# Patient Record
Sex: MALE | Race: OTHER | HISPANIC OR LATINO | Employment: UNEMPLOYED | URBAN - METROPOLITAN AREA
[De-identification: names, ages, dates, MRNs, and addresses within clinical notes are randomized per-mention and may not be internally consistent; named-entity substitution may affect disease eponyms.]

---

## 2017-05-29 ENCOUNTER — HOSPITAL ENCOUNTER (EMERGENCY)
Facility: HOSPITAL | Age: 32
Discharge: HOME | End: 2017-05-29
Attending: EMERGENCY MEDICINE | Admitting: EMERGENCY MEDICINE

## 2017-05-29 VITALS
HEART RATE: 67 BPM | OXYGEN SATURATION: 98 % | SYSTOLIC BLOOD PRESSURE: 123 MMHG | RESPIRATION RATE: 16 BRPM | WEIGHT: 250 LBS | DIASTOLIC BLOOD PRESSURE: 62 MMHG | TEMPERATURE: 98.7 F

## 2017-05-29 DIAGNOSIS — L03.113 CELLULITIS OF RIGHT FOREARM: Primary | ICD-10-CM

## 2017-05-29 PROCEDURE — 99282 EMERGENCY DEPT VISIT SF MDM: CPT

## 2017-05-29 RX ORDER — NAPROXEN 500 MG/1
500 TABLET ORAL EVERY 12 HOURS PRN
Qty: 15 TABLET | Refills: 0 | Status: ON HOLD | OUTPATIENT
Start: 2017-05-29 | End: 2018-11-26

## 2017-05-29 RX ORDER — CEPHALEXIN 500 MG/1
500 CAPSULE ORAL 4 TIMES DAILY
Qty: 28 CAPSULE | Refills: 0 | Status: SHIPPED | OUTPATIENT
Start: 2017-05-29 | End: 2017-06-05

## 2017-05-29 RX ORDER — NAPROXEN 250 MG/1
500 TABLET ORAL ONCE
Status: COMPLETED | OUTPATIENT
Start: 2017-05-29 | End: 2017-05-29

## 2017-05-29 RX ADMIN — NAPROXEN 500 MG: 250 TABLET ORAL at 18:49

## 2017-07-05 ENCOUNTER — HOSPITAL ENCOUNTER (EMERGENCY)
Facility: HOSPITAL | Age: 32
Discharge: HOME | End: 2017-07-05

## 2017-07-05 VITALS
TEMPERATURE: 98.3 F | DIASTOLIC BLOOD PRESSURE: 56 MMHG | OXYGEN SATURATION: 98 % | WEIGHT: 258 LBS | SYSTOLIC BLOOD PRESSURE: 117 MMHG | HEART RATE: 84 BPM | RESPIRATION RATE: 16 BRPM

## 2017-07-05 DIAGNOSIS — S61.412A LACERATION OF HAND, LEFT: Primary | ICD-10-CM

## 2017-07-05 PROCEDURE — 99282 EMERGENCY DEPT VISIT SF MDM: CPT

## 2017-07-05 PROCEDURE — 90471 IMMUNIZATION ADMIN: CPT

## 2017-07-05 PROCEDURE — 90715 TDAP VACCINE 7 YRS/> IM: CPT | Performed by: PHYSICIAN ASSISTANT

## 2017-07-05 RX ORDER — LIDOCAINE HYDROCHLORIDE AND EPINEPHRINE 10; 10 MG/ML; UG/ML
5 INJECTION, SOLUTION INFILTRATION; PERINEURAL ONCE
Status: COMPLETED | OUTPATIENT
Start: 2017-07-05 | End: 2017-07-05

## 2017-07-05 RX ORDER — BACITRACIN, NEOMYCIN, POLYMYXIN B 400; 3.5; 5 [USP'U]/G; MG/G; [USP'U]/G
1 OINTMENT TOPICAL ONCE
Status: COMPLETED | OUTPATIENT
Start: 2017-07-05 | End: 2017-07-05

## 2017-07-05 RX ADMIN — BACITRACIN, NEOMYCIN, POLYMYXIN B 1 SMALL APPLICATION: 400; 3.5; 5 OINTMENT TOPICAL at 20:49

## 2017-07-05 RX ADMIN — TETANUS TOXOID, REDUCED DIPHTHERIA TOXOID AND ACELLULAR PERTUSSIS VACCINE, ADSORBED 0.5 ML: 5; 2.5; 8; 8; 2.5 SUSPENSION INTRAMUSCULAR at 20:49

## 2017-07-05 RX ADMIN — LIDOCAINE HYDROCHLORIDE,EPINEPHRINE BITARTRATE 5 ML: 10; .01 INJECTION, SOLUTION INFILTRATION; PERINEURAL at 20:49

## 2018-11-26 ENCOUNTER — APPOINTMENT (EMERGENCY)
Dept: RADIOLOGY | Facility: HOSPITAL | Age: 33
End: 2018-11-26

## 2018-11-26 ENCOUNTER — HOSPITAL ENCOUNTER (OUTPATIENT)
Facility: HOSPITAL | Age: 33
Setting detail: OBSERVATION
Discharge: HOME | End: 2018-11-26
Attending: EMERGENCY MEDICINE | Admitting: INTERNAL MEDICINE

## 2018-11-26 VITALS
BODY MASS INDEX: 43.2 KG/M2 | OXYGEN SATURATION: 96 % | HEART RATE: 80 BPM | DIASTOLIC BLOOD PRESSURE: 59 MMHG | SYSTOLIC BLOOD PRESSURE: 120 MMHG | RESPIRATION RATE: 16 BRPM | HEIGHT: 65 IN | WEIGHT: 259.26 LBS | TEMPERATURE: 96.3 F

## 2018-11-26 DIAGNOSIS — E66.01 MORBID OBESITY DUE TO EXCESS CALORIES (HCC): Chronic | ICD-10-CM

## 2018-11-26 DIAGNOSIS — E11.8 TYPE 2 DIABETES MELLITUS WITH COMPLICATION, WITHOUT LONG-TERM CURRENT USE OF INSULIN (HCC): Chronic | ICD-10-CM

## 2018-11-26 DIAGNOSIS — R07.9 CHEST PAIN: Primary | ICD-10-CM

## 2018-11-26 DIAGNOSIS — Z98.890 H/O CARDIAC CATHETERIZATION: ICD-10-CM

## 2018-11-26 DIAGNOSIS — E78.00 HYPERCHOLESTEROLEMIA: Chronic | ICD-10-CM

## 2018-11-26 DIAGNOSIS — I10 ESSENTIAL HYPERTENSION: Chronic | ICD-10-CM

## 2018-11-26 DIAGNOSIS — I25.10 CORONARY ARTERY DISEASE INVOLVING NATIVE HEART WITHOUT ANGINA PECTORIS, UNSPECIFIED VESSEL OR LESION TYPE: ICD-10-CM

## 2018-11-26 PROBLEM — E11.9 DM (DIABETES MELLITUS), TYPE 2 (HCC): Chronic | Status: ACTIVE | Noted: 2018-11-26

## 2018-11-26 LAB
ALBUMIN SERPL BCP-MCNC: 3.7 G/DL (ref 3.5–5)
ALP SERPL-CCNC: 71 U/L (ref 46–116)
ALT SERPL W P-5'-P-CCNC: 28 U/L (ref 12–78)
ANION GAP SERPL CALCULATED.3IONS-SCNC: 11 MMOL/L (ref 4–13)
ANION GAP SERPL CALCULATED.3IONS-SCNC: 13 MMOL/L (ref 4–13)
APTT PPP: 27 SECONDS (ref 26–38)
AST SERPL W P-5'-P-CCNC: 11 U/L (ref 5–45)
ATRIAL RATE: 101 BPM
ATRIAL RATE: 107 BPM
ATRIAL RATE: 86 BPM
BASOPHILS # BLD AUTO: 0.01 THOUSANDS/ΜL (ref 0–0.1)
BASOPHILS # BLD AUTO: 0.02 THOUSANDS/ΜL (ref 0–0.1)
BASOPHILS NFR BLD AUTO: 0 % (ref 0–1)
BASOPHILS NFR BLD AUTO: 0 % (ref 0–1)
BILIRUB SERPL-MCNC: 0.37 MG/DL (ref 0.2–1)
BUN SERPL-MCNC: 12 MG/DL (ref 5–25)
BUN SERPL-MCNC: 12 MG/DL (ref 5–25)
CALCIUM SERPL-MCNC: 9.3 MG/DL (ref 8.3–10.1)
CALCIUM SERPL-MCNC: 9.5 MG/DL (ref 8.3–10.1)
CHLORIDE SERPL-SCNC: 101 MMOL/L (ref 100–108)
CHLORIDE SERPL-SCNC: 99 MMOL/L (ref 100–108)
CHOLEST SERPL-MCNC: 330 MG/DL (ref 50–200)
CO2 SERPL-SCNC: 25 MMOL/L (ref 21–32)
CO2 SERPL-SCNC: 25 MMOL/L (ref 21–32)
CREAT SERPL-MCNC: 0.76 MG/DL (ref 0.6–1.3)
CREAT SERPL-MCNC: 0.94 MG/DL (ref 0.6–1.3)
EOSINOPHIL # BLD AUTO: 0.1 THOUSAND/ΜL (ref 0–0.61)
EOSINOPHIL # BLD AUTO: 0.16 THOUSAND/ΜL (ref 0–0.61)
EOSINOPHIL NFR BLD AUTO: 1 % (ref 0–6)
EOSINOPHIL NFR BLD AUTO: 2 % (ref 0–6)
ERYTHROCYTE [DISTWIDTH] IN BLOOD BY AUTOMATED COUNT: 12.6 % (ref 11.6–15.1)
ERYTHROCYTE [DISTWIDTH] IN BLOOD BY AUTOMATED COUNT: 12.7 % (ref 11.6–15.1)
EST. AVERAGE GLUCOSE BLD GHB EST-MCNC: 192 MG/DL
GFR SERPL CREATININE-BSD FRML MDRD: 105 ML/MIN/1.73SQ M
GFR SERPL CREATININE-BSD FRML MDRD: 119 ML/MIN/1.73SQ M
GLUCOSE P FAST SERPL-MCNC: 152 MG/DL (ref 65–99)
GLUCOSE SERPL-MCNC: 152 MG/DL (ref 65–140)
GLUCOSE SERPL-MCNC: 168 MG/DL (ref 65–140)
GLUCOSE SERPL-MCNC: 218 MG/DL (ref 65–140)
HBA1C MFR BLD: 8.3 % (ref 4.2–6.3)
HCT VFR BLD AUTO: 42.3 % (ref 36.5–49.3)
HCT VFR BLD AUTO: 45.3 % (ref 36.5–49.3)
HDLC SERPL-MCNC: 30 MG/DL (ref 40–60)
HGB BLD-MCNC: 14.4 G/DL (ref 12–17)
HGB BLD-MCNC: 15.1 G/DL (ref 12–17)
IMM GRANULOCYTES # BLD AUTO: 0.02 THOUSAND/UL (ref 0–0.2)
IMM GRANULOCYTES # BLD AUTO: 0.02 THOUSAND/UL (ref 0–0.2)
IMM GRANULOCYTES NFR BLD AUTO: 0 % (ref 0–2)
IMM GRANULOCYTES NFR BLD AUTO: 0 % (ref 0–2)
INR PPP: 1.03 (ref 0.86–1.17)
LDLC SERPL CALC-MCNC: 253 MG/DL (ref 0–100)
LYMPHOCYTES # BLD AUTO: 1.61 THOUSANDS/ΜL (ref 0.6–4.47)
LYMPHOCYTES # BLD AUTO: 1.77 THOUSANDS/ΜL (ref 0.6–4.47)
LYMPHOCYTES NFR BLD AUTO: 18 % (ref 14–44)
LYMPHOCYTES NFR BLD AUTO: 21 % (ref 14–44)
MCH RBC QN AUTO: 30 PG (ref 26.8–34.3)
MCH RBC QN AUTO: 30.6 PG (ref 26.8–34.3)
MCHC RBC AUTO-ENTMCNC: 33.3 G/DL (ref 31.4–37.4)
MCHC RBC AUTO-ENTMCNC: 34 G/DL (ref 31.4–37.4)
MCV RBC AUTO: 90 FL (ref 82–98)
MCV RBC AUTO: 90 FL (ref 82–98)
MONOCYTES # BLD AUTO: 0.51 THOUSAND/ΜL (ref 0.17–1.22)
MONOCYTES # BLD AUTO: 0.65 THOUSAND/ΜL (ref 0.17–1.22)
MONOCYTES NFR BLD AUTO: 6 % (ref 4–12)
MONOCYTES NFR BLD AUTO: 8 % (ref 4–12)
NEUTROPHILS # BLD AUTO: 5.68 THOUSANDS/ΜL (ref 1.85–7.62)
NEUTROPHILS # BLD AUTO: 6.67 THOUSANDS/ΜL (ref 1.85–7.62)
NEUTS SEG NFR BLD AUTO: 69 % (ref 43–75)
NEUTS SEG NFR BLD AUTO: 75 % (ref 43–75)
NRBC BLD AUTO-RTO: 0 /100 WBCS
NRBC BLD AUTO-RTO: 0 /100 WBCS
P AXIS: 57 DEGREES
P AXIS: 62 DEGREES
P AXIS: 63 DEGREES
PLATELET # BLD AUTO: 267 THOUSANDS/UL (ref 149–390)
PLATELET # BLD AUTO: 280 THOUSANDS/UL (ref 149–390)
PMV BLD AUTO: 10.2 FL (ref 8.9–12.7)
PMV BLD AUTO: 10.3 FL (ref 8.9–12.7)
POTASSIUM SERPL-SCNC: 3.5 MMOL/L (ref 3.5–5.3)
POTASSIUM SERPL-SCNC: 3.5 MMOL/L (ref 3.5–5.3)
PR INTERVAL: 152 MS
PR INTERVAL: 160 MS
PR INTERVAL: 162 MS
PROT SERPL-MCNC: 8.2 G/DL (ref 6.4–8.2)
PROTHROMBIN TIME: 13.6 SECONDS (ref 11.8–14.2)
QRS AXIS: 50 DEGREES
QRS AXIS: 51 DEGREES
QRS AXIS: 60 DEGREES
QRSD INTERVAL: 82 MS
QRSD INTERVAL: 84 MS
QRSD INTERVAL: 86 MS
QT INTERVAL: 314 MS
QT INTERVAL: 318 MS
QT INTERVAL: 336 MS
QTC INTERVAL: 402 MS
QTC INTERVAL: 412 MS
QTC INTERVAL: 419 MS
RBC # BLD AUTO: 4.7 MILLION/UL (ref 3.88–5.62)
RBC # BLD AUTO: 5.03 MILLION/UL (ref 3.88–5.62)
SODIUM SERPL-SCNC: 137 MMOL/L (ref 136–145)
SODIUM SERPL-SCNC: 137 MMOL/L (ref 136–145)
T WAVE AXIS: 37 DEGREES
T WAVE AXIS: 41 DEGREES
T WAVE AXIS: 50 DEGREES
TRIGL SERPL-MCNC: 234 MG/DL
TROPONIN I SERPL-MCNC: <0.02 NG/ML
VENTRICULAR RATE: 101 BPM
VENTRICULAR RATE: 107 BPM
VENTRICULAR RATE: 86 BPM
WBC # BLD AUTO: 8.29 THOUSAND/UL (ref 4.31–10.16)
WBC # BLD AUTO: 8.93 THOUSAND/UL (ref 4.31–10.16)

## 2018-11-26 PROCEDURE — 80048 BASIC METABOLIC PNL TOTAL CA: CPT | Performed by: INTERNAL MEDICINE

## 2018-11-26 PROCEDURE — 80053 COMPREHEN METABOLIC PANEL: CPT | Performed by: EMERGENCY MEDICINE

## 2018-11-26 PROCEDURE — 99285 EMERGENCY DEPT VISIT HI MDM: CPT

## 2018-11-26 PROCEDURE — 71046 X-RAY EXAM CHEST 2 VIEWS: CPT

## 2018-11-26 PROCEDURE — 85025 COMPLETE CBC W/AUTO DIFF WBC: CPT | Performed by: NURSE PRACTITIONER

## 2018-11-26 PROCEDURE — 83036 HEMOGLOBIN GLYCOSYLATED A1C: CPT | Performed by: NURSE PRACTITIONER

## 2018-11-26 PROCEDURE — 82948 REAGENT STRIP/BLOOD GLUCOSE: CPT

## 2018-11-26 PROCEDURE — 85610 PROTHROMBIN TIME: CPT | Performed by: EMERGENCY MEDICINE

## 2018-11-26 PROCEDURE — 99236 HOSP IP/OBS SAME DATE HI 85: CPT | Performed by: HOSPITALIST

## 2018-11-26 PROCEDURE — 85025 COMPLETE CBC W/AUTO DIFF WBC: CPT | Performed by: EMERGENCY MEDICINE

## 2018-11-26 PROCEDURE — 85730 THROMBOPLASTIN TIME PARTIAL: CPT | Performed by: EMERGENCY MEDICINE

## 2018-11-26 PROCEDURE — 84484 ASSAY OF TROPONIN QUANT: CPT | Performed by: NURSE PRACTITIONER

## 2018-11-26 PROCEDURE — 93005 ELECTROCARDIOGRAM TRACING: CPT

## 2018-11-26 PROCEDURE — 93010 ELECTROCARDIOGRAM REPORT: CPT | Performed by: INTERNAL MEDICINE

## 2018-11-26 PROCEDURE — 90686 IIV4 VACC NO PRSV 0.5 ML IM: CPT | Performed by: HOSPITALIST

## 2018-11-26 PROCEDURE — 80061 LIPID PANEL: CPT | Performed by: NURSE PRACTITIONER

## 2018-11-26 PROCEDURE — 84484 ASSAY OF TROPONIN QUANT: CPT | Performed by: EMERGENCY MEDICINE

## 2018-11-26 PROCEDURE — 99220 PR INITIAL OBSERVATION CARE/DAY 70 MINUTES: CPT | Performed by: HOSPITALIST

## 2018-11-26 PROCEDURE — 36415 COLL VENOUS BLD VENIPUNCTURE: CPT | Performed by: EMERGENCY MEDICINE

## 2018-11-26 RX ORDER — ROSUVASTATIN CALCIUM 40 MG/1
40 TABLET, COATED ORAL
Qty: 30 TABLET | Refills: 0 | Status: SHIPPED | OUTPATIENT
Start: 2018-11-26 | End: 2019-11-05 | Stop reason: HOSPADM

## 2018-11-26 RX ORDER — CLOPIDOGREL BISULFATE 75 MG/1
75 TABLET ORAL DAILY
Status: DISCONTINUED | OUTPATIENT
Start: 2018-11-26 | End: 2018-11-26 | Stop reason: HOSPADM

## 2018-11-26 RX ORDER — ATORVASTATIN CALCIUM 40 MG/1
40 TABLET, FILM COATED ORAL
Status: DISCONTINUED | OUTPATIENT
Start: 2018-11-26 | End: 2018-11-26 | Stop reason: HOSPADM

## 2018-11-26 RX ORDER — ASPIRIN 81 MG/1
81 TABLET ORAL DAILY
Status: ON HOLD | COMMUNITY
End: 2018-11-26

## 2018-11-26 RX ORDER — EZETIMIBE 10 MG/1
10 TABLET ORAL DAILY
COMMUNITY

## 2018-11-26 RX ORDER — LISINOPRIL 5 MG/1
5 TABLET ORAL DAILY
Status: DISCONTINUED | OUTPATIENT
Start: 2018-11-26 | End: 2018-11-26 | Stop reason: HOSPADM

## 2018-11-26 RX ORDER — ACETAMINOPHEN 325 MG/1
650 TABLET ORAL EVERY 4 HOURS PRN
Status: DISCONTINUED | OUTPATIENT
Start: 2018-11-26 | End: 2018-11-26 | Stop reason: HOSPADM

## 2018-11-26 RX ORDER — METOPROLOL SUCCINATE 25 MG/1
25 TABLET, EXTENDED RELEASE ORAL DAILY
Status: ON HOLD | COMMUNITY
End: 2018-11-26

## 2018-11-26 RX ORDER — CLOPIDOGREL BISULFATE 75 MG/1
75 TABLET ORAL DAILY
Qty: 30 TABLET | Refills: 0 | Status: SHIPPED | OUTPATIENT
Start: 2018-11-26 | End: 2019-11-05 | Stop reason: HOSPADM

## 2018-11-26 RX ORDER — ASPIRIN 81 MG/1
81 TABLET ORAL DAILY
Qty: 30 TABLET | Refills: 0 | Status: SHIPPED | OUTPATIENT
Start: 2018-11-26 | End: 2019-11-05 | Stop reason: HOSPADM

## 2018-11-26 RX ORDER — ENALAPRIL MALEATE 2.5 MG/1
2.5 TABLET ORAL DAILY
Status: ON HOLD | COMMUNITY
End: 2018-11-26

## 2018-11-26 RX ORDER — ONDANSETRON 2 MG/ML
4 INJECTION INTRAMUSCULAR; INTRAVENOUS EVERY 6 HOURS PRN
Status: DISCONTINUED | OUTPATIENT
Start: 2018-11-26 | End: 2018-11-26 | Stop reason: HOSPADM

## 2018-11-26 RX ORDER — METOPROLOL SUCCINATE 25 MG/1
25 TABLET, EXTENDED RELEASE ORAL DAILY
Status: DISCONTINUED | OUTPATIENT
Start: 2018-11-26 | End: 2018-11-26 | Stop reason: HOSPADM

## 2018-11-26 RX ORDER — ASPIRIN 81 MG/1
324 TABLET, CHEWABLE ORAL ONCE
Status: COMPLETED | OUTPATIENT
Start: 2018-11-26 | End: 2018-11-26

## 2018-11-26 RX ORDER — METOPROLOL SUCCINATE 50 MG/1
50 TABLET, EXTENDED RELEASE ORAL DAILY
Qty: 30 TABLET | Refills: 0 | Status: ON HOLD | OUTPATIENT
Start: 2018-11-26 | End: 2019-11-05 | Stop reason: SDUPTHER

## 2018-11-26 RX ORDER — METOPROLOL SUCCINATE 50 MG/1
50 TABLET, EXTENDED RELEASE ORAL DAILY
Status: ON HOLD | COMMUNITY
End: 2018-11-26

## 2018-11-26 RX ORDER — NAPROXEN 500 MG/1
500 TABLET ORAL EVERY 12 HOURS PRN
Qty: 15 TABLET | Refills: 0 | Status: SHIPPED | OUTPATIENT
Start: 2018-11-26 | End: 2019-11-05 | Stop reason: HOSPADM

## 2018-11-26 RX ORDER — CLOPIDOGREL BISULFATE 75 MG/1
75 TABLET ORAL DAILY
Status: ON HOLD | COMMUNITY
End: 2018-11-26

## 2018-11-26 RX ORDER — ACETAMINOPHEN 325 MG/1
TABLET ORAL
Qty: 30 TABLET | Refills: 0 | Status: SHIPPED | OUTPATIENT
Start: 2018-11-26

## 2018-11-26 RX ORDER — ENALAPRIL MALEATE 2.5 MG/1
2.5 TABLET ORAL DAILY
Qty: 30 TABLET | Refills: 0 | Status: ON HOLD | OUTPATIENT
Start: 2018-11-26 | End: 2019-11-05 | Stop reason: SDUPTHER

## 2018-11-26 RX ORDER — ATORVASTATIN CALCIUM 40 MG/1
40 TABLET, FILM COATED ORAL DAILY
COMMUNITY
End: 2018-11-26 | Stop reason: HOSPADM

## 2018-11-26 RX ORDER — ROSUVASTATIN CALCIUM 40 MG/1
40 TABLET, COATED ORAL
Status: ON HOLD | COMMUNITY
End: 2018-11-26

## 2018-11-26 RX ORDER — ASPIRIN 81 MG/1
81 TABLET ORAL DAILY
Status: DISCONTINUED | OUTPATIENT
Start: 2018-11-26 | End: 2018-11-26 | Stop reason: HOSPADM

## 2018-11-26 RX ORDER — NITROGLYCERIN 0.4 MG/1
0.4 TABLET SUBLINGUAL
Status: DISCONTINUED | OUTPATIENT
Start: 2018-11-26 | End: 2018-11-26 | Stop reason: HOSPADM

## 2018-11-26 RX ADMIN — NITROGLYCERIN 0.4 MG: 0.4 TABLET SUBLINGUAL at 01:41

## 2018-11-26 RX ADMIN — LISINOPRIL 5 MG: 5 TABLET ORAL at 08:57

## 2018-11-26 RX ADMIN — INFLUENZA VIRUS VACCINE 0.5 ML: 15; 15; 15; 15 SUSPENSION INTRAMUSCULAR at 10:49

## 2018-11-26 RX ADMIN — ASPIRIN 81 MG 324 MG: 81 TABLET ORAL at 01:02

## 2018-11-26 RX ADMIN — CLOPIDOGREL BISULFATE 75 MG: 75 TABLET ORAL at 08:57

## 2018-11-26 RX ADMIN — ASPIRIN 81 MG: 81 TABLET, COATED ORAL at 08:57

## 2018-11-26 RX ADMIN — INSULIN LISPRO 2 UNITS: 100 INJECTION, SOLUTION INTRAVENOUS; SUBCUTANEOUS at 08:56

## 2018-11-26 RX ADMIN — METOPROLOL SUCCINATE 25 MG: 25 TABLET, EXTENDED RELEASE ORAL at 08:57

## 2018-11-26 NOTE — ASSESSMENT & PLAN NOTE
· Reports chest pain radiating to both arms; h/o MI in 2014 s/p JESUS MANUEL on ASA and Plavix, continue  · NICK=2 for CAD risk factors and ASA  · EKG without acute ischemic changes  · Troponin negative, trend x3  · Telemetry monitoring  · Outpatient follow-up with Cardiology

## 2018-11-26 NOTE — ASSESSMENT & PLAN NOTE
· Patient unsure of name of medication although reports he is on a statin    Will give atorvastatin 40 mg  · Lipid panel

## 2018-11-26 NOTE — NURSING NOTE
With the use of a Arabic speaking RN, pt was explained his discharge instructions  Reviewed his medications and educated pt on the importance of taking his meds as scheduled  RN also educated pt on the importance of diet and weight mgmt    Pt's significant other at bedside

## 2018-11-26 NOTE — DISCHARGE SUMMARY
Discharge Summary - Medical Alize Augustin 28 y o  male MRN: [id number]    On license of [facility]  Room / Bed: [facility] [address] 951/Y0 -* Encounter: [id number]    BRIEF OVERVIEW      Admission Date: 11/26/2018       Discharge Date:  11/26/2018      Admitting Diagnosis:  Chest pain    Primary Discharge Diagnosis  Principal Problem (Resolved):    Chest pain-noncardiac  Active Problems:    Hypercholesterolemia    Essential hypertension    DM (diabetes mellitus), type 2 (HCC)    Morbid obesity with body mass index (BMI) of 40 0 to 44 9 in [facility])      Service:  [facility] Internal Medicine    Assessment and plan on date of discharge    Chest pain-a typical noncardiac   Assessment & Plan     · Reports chest pain radiating to both arms; h/o MI in 2014 s/p JESUS MANUEL on ASA and EKG without acute ischemic changes  · Troponin negative, trend x3      DM (diabetes mellitus), type 2 (HCC)   Assessment & Plan             Lab Results   Component Value Date     HGBA1C 6 7 (H) 03/01/2014      · On Janumet 50-1000mg bid-will continue  ·        Blood Sugar Average: Last 72 hrs:         Essential hypertension   Assessment & Plan     · Reports he is on daily metoprolol and enalapril,-however doubt compliance         Hypercholesterolemia   Assessment & Plan     · On Crestor  ·       Morbid obesity with body mass index (BMI) of 40 0 to 44 9 in [facility])   Assessment & Plan     · Body mass index is 43 14 kg/m²    · Encourage weight loss and exercise  ·         Patient is stable for discharge home  Discharge Condition: Improved    Discharge Disposition: Home/Self Care    Discharge Follow Up Appointments:   PCP    Discharge  Statement   Total Time Spent today including physical exam, discussion with patient and family, and discharge arrangements/care22 minutes

## 2018-11-26 NOTE — UTILIZATION REVIEW
[address]  Utilization Review Department  Phone: [phone]; Fax [phone]  [email or web address]  ATTENTION: Please call with any questions or concerns to [phone]  and carefully listen to the prompts so that you are directed to the right person  Send all requests for admission clinical reviews, approved or denied determinations and any other requests to fax [phone]  All voicemails are confidential   Initial Clinical Review    Admission: Date/Time/Statement: 11/26/2018  0201    Orders Placed This Encounter   Procedures    Place in Observation (expected length of stay for this patient is less than two midnights)     Standing Status:   Standing     Number of Occurrences:   1     Order Specific Question:   Admitting Physician     Answer:   Pieter Vance     Order Specific Question:   Level of Care     Answer:   Med Surg [16]         ED: Date/Time/Mode of Arrival:   ED Arrival Information     Expected Arrival Acuity Means of Arrival Escorted By Service Admission Type    - 11/26/2018 00:32 Emergent Walk-In Self Hospitalist Emergency    Arrival Complaint    chest pain          Chief Complaint:   Chief Complaint   Patient presents with    Chest Pain     B/L upper chest pain, arms for 1 hour, SOB          History of Illness: 28 yr old s/p stent in 2014--c/o chest pain radiating to both arms while watching tv-- took 1 ntg and came to er    ED Vital Signs:   ED Triage Vitals [11/26/18 0038]   Temperature Pulse Respirations Blood Pressure SpO2   98 1 °F (36 7 °C) (!) 113 20 135/88 96 %      Temp Source Heart Rate Source Patient Position - Orthostatic VS BP Location FiO2 (%)   Oral Monitor Sitting Left arm --      Pain Score       9        Wt Readings from Last 1 Encounters:   11/26/18 118 kg (259 lb 4 2 oz)       Vital Signs (abnormal):pulse     Abnormal Labs/Diagnostic Test Results:cl 99--bs 218  Chest-- borderline cardiomegaly  ED Treatment: Medication Administration from 11/26/2018 0032 to 11/26/2018 0239       Date/Time Order Dose Route Action Action by Comments     11/26/2018 0102 aspirin chewable tablet 324 mg 324 mg Oral Given Daniel Fitch RN      11/26/2018 0141 nitroglycerin (NITROSTAT) SL tablet 0 4 mg 0 4 mg Sublingual Given Daniel Fitch RN           Past Medical/Surgical History:    Active Ambulatory Problems     Diagnosis Date Noted    No Active Ambulatory Problems     Resolved Ambulatory Problems     Diagnosis Date Noted    No Resolved Ambulatory Problems     Past Medical History:   Diagnosis Date    Cardiac disease        Admitting Diagnosis: Chest pain [R07 9]  H/O cardiac catheterization [Z98 890]    Age/Sex: 28 y o  male    Assessment/Plan: chest pain -  telm  Cardiology consult   trops  x3      Admission Orders:  Scheduled Meds:   Current Facility-Administered Medications:  acetaminophen 650 mg Oral Q4H PRN Omega Crank, CRNP   aspirin 81 mg Oral Daily Omega Crank, CRNP   atorvastatin 40 mg Oral Daily With [employer], CRNP   clopidogrel 75 mg Oral Daily Omega Crank, CRNP   insulin lispro 1-5 Units Subcutaneous HS [place] Crank, CRNP   insulin lispro 2-12 Units Subcutaneous TID AC Omega Crank, CRNP   lisinopril 5 mg Oral Daily Omega Crank, CRNP   metoprolol succinate 25 mg Oral Daily Omega Crank, CRNP   nitroglycerin 0 4 mg Sublingual Q5 Min PRN Mehrdad Cruz PA-C   ondansetron 4 mg Intravenous Q6H PRN Omega Crank, CRNP     Continuous Infusions:    PRN Meds:   acetaminophen    nitroglycerinx1    ondansetron       fs glucose 4xdaily  telm  Trop  <0 02 x2  Cardiac diet    Triglycerides  234--hdl--30--ldl 253

## 2018-11-26 NOTE — ASSESSMENT & PLAN NOTE
Lab Results   Component Value Date    HGBA1C 6 7 (H) 03/01/2014     · On Janumet 50-1000mg bid  · Start Accu-Cheks and sliding scale  No results for input(s): POCGLU in the last 72 hours      Blood Sugar Average: Last 72 hrs:

## 2018-11-26 NOTE — H&P
H&P- Jaleel Augustin 1985, 28 y o  male MRN: [id number]    Unit/Bed#: E4 -01 Encounter: [id number]    Primary Care Provider: No primary care provider on file  Date and time admitted to hospital: 11/26/2018 12:44 AM      * Chest pain   Assessment & Plan    · Reports chest pain radiating to both arms; h/o MI in 2014 s/p JESUS MANUEL on ASA and Plavix, continue  · NICK=2 for CAD risk factors and ASA  · EKG without acute ischemic changes  · Troponin negative, trend x3  · Telemetry monitoring  · Outpatient follow-up with Cardiology     [facility]di[facility] ([facility] )   Assessment & Plan    Lab Results   Component Value Date    HGBA1C 6 7 (H) 03/01/2014     · On Janumet 50-1000mg bid  · Start Accu-Cheks and sliding scale  No results for input(s): POCGLU in the last 72 hours  Blood Sugar Average: Last 72 hrs:       Essential hypertension   Assessment & Plan    · Reports he is on daily metoprolol and enalapril, unsure of dose  Will give low-dose of each     Hypercholesterolemia   Assessment & Plan    · Patient unsure of name of medication although reports he is on a statin  Will give atorvastatin 40 mg  · Lipid panel     Morbid obesity with body mass index (BMI) of 40 0 to 44 9 in adult [facility])   Assessment & Plan    · Body mass index is 43 14 kg/m²  · Encourage weight loss and exercise  · Nutrition consult         VTE Prophylaxis: Low risk  / reason for no mechanical VTE prophylaxis Ambulate   Code Status:  Full code  POLST: POLST is not applicable to this patient  Discussion with family:  Spouse at bedside    Anticipated Length of Stay:  Patient will be admitted on an Observation basis with an anticipated length of stay of  < 2 midnights  Justification for Hospital Stay: CP    Total Time for Visit, including Counseling / Coordination of Care: 45 minutes  Greater than 50% of this total time spent on direct patient counseling and coordination of care      Chief Complaint:   Chest pain    History of Present Illness:    Pauline Deleon is a 28 y o  male with [address] [place] 2014 s/p stent, DM, HTN, HLD who presents with c/o Chest pain  Reports he was seated watching TV, he developed chest pain radiating to both arms, took 1 NTG and came to ER; denies associated shortness of breath, diaphoresis or nausea, no aggravating or relieving factor identified, chest pain nonreproducible on palpation  Denies any other symptoms, ROS negative for palpitations, fever, cough, shortness of breath, abdominal pain, NVCD or dysuria  Review of Systems:    Review of Systems   Constitutional: Negative  HENT: Negative  Respiratory: Negative  Cardiovascular: Positive for chest pain  Negative for palpitations and leg swelling  Gastrointestinal: Negative  Genitourinary: Negative  Musculoskeletal: Negative  Neurological: Negative  Past Medical and Surgical History:     Past Medical History:   Diagnosis Date    Cardiac disease        Past Surgical History:   Procedure Laterality Date    CARDIAC CATHETERIZATION      CORONARY ANGIOPLASTY WITH STENT PLACEMENT         Meds/Allergies:    Prior to Admission medications    Medication Sig Start Date End Date Taking?  Authorizing Provider   aspirin (ECOTRIN LOW STRENGTH) 81 mg EC tablet Take 81 mg by mouth daily   Yes Historical Provider, MD   atorvastatin (LIPITOR) 40 mg tablet Take 40 mg by mouth daily   Yes Historical Provider, MD   clopidogrel (PLAVIX) 75 mg tablet Take 75 mg by mouth daily   Yes Historical Provider, MD   enalapril (VASOTEC) 10 mg tablet Take 10 mg by mouth daily   Yes Historical Provider, MD   metoprolol succinate (TOPROL-XL) 25 mg 24 hr tablet Take 25 mg by mouth daily   Yes Historical Provider, MD   sitaGLIPtin-metFORMIN (JANUMET)  MG per tablet Take 1 tablet by mouth 2 (two) times a day with meals   Yes Historical Provider, MD   naproxen (NAPROSYN) 500 mg tablet Take 1 tablet by mouth every 12 (twelve) hours as needed for mild pain or moderate pain 5/29/17   Jerrod March, DO     I have reviewed home medications with patient personally  Allergies: No Known Allergies    Social History:     Marital Status: Single   Occupation: [employer]  Patient Pre-hospital Living Situation: lives at home w spouse and kids  Patient Pre-hospital Level of Mobility: ambulatory  Patient Pre-hospital Diet Restrictions:   Substance Use History:   History   Alcohol Use    Yes     Comment: socailly      History   Smoking Status    Never Smoker   Smokeless Tobacco    Never Used     History   Drug Use No       Family History:    History reviewed  No pertinent family history  Physical Exam:     Vitals:   Blood Pressure: 113/64 (11/26/18 0247)  Pulse: 80 (11/26/18 0247)  Temperature: (!) 96 2 °F (35 7 °C) (11/26/18 0247)  Temp Source: Tympanic (11/26/18 0247)  Respirations: 18 (11/26/18 0247)  Height: 5' 5" (165 1 cm) (11/26/18 0247)  Weight - Scale: 118 kg (259 lb 4 2 oz) (11/26/18 0247)  SpO2: 93 % (11/26/18 0247)    Physical Exam   Constitutional: He is oriented to person, place, and time  He appears well-developed  No distress  Morbid obesity   HENT:   Head: Normocephalic and atraumatic  Eyes: Conjunctivae are normal    Neck: Neck supple  Cardiovascular: Normal rate, regular rhythm, normal heart sounds and intact distal pulses  No murmur heard  Pulmonary/Chest: Effort normal and breath sounds normal  No respiratory distress  He has no wheezes  He has no rales  He exhibits no tenderness  Abdominal: Soft  Bowel sounds are normal  He exhibits no distension and no mass  There is no tenderness  There is no rebound and no guarding  Musculoskeletal: He exhibits no edema or tenderness  Neurological: He is alert and oriented to person, place, and time  Skin: Skin is dry  No rash noted  He is not diaphoretic  No erythema  No pallor  Psychiatric: He has a normal mood and affect   His behavior is normal  Judgment and thought content normal      Additional Data:     Lab Results: I have personally reviewed pertinent reports  Results from last 7 days  Lab Units 11/26/18  0046   WBC Thousand/uL 8 93   HEMOGLOBIN g/dL 15 1   HEMATOCRIT % 45 3   PLATELETS Thousands/uL 280   NEUTROS PCT % 75   LYMPHS PCT % 18   MONOS PCT % 6   EOS PCT % 1       Results from last 7 days  Lab Units 11/26/18  0046   SODIUM mmol/L 137   POTASSIUM mmol/L 3 5   CHLORIDE mmol/L 99*   CO2 mmol/L 25   BUN mg/dL 12   CREATININE mg/dL 0 94   ANION GAP mmol/L 13   CALCIUM mg/dL 9 5   ALBUMIN g/dL 3 7   TOTAL BILIRUBIN mg/dL 0 37   ALK PHOS U/L 71   ALT U/L 28   AST U/L 11   GLUCOSE RANDOM mg/dL 218*       Results from last 7 days  Lab Units 11/26/18  0046   INR  1 03                   Imaging: I have personally reviewed pertinent reports  X-ray chest 2 views   ED Interpretation by Susy Cottrell PA-C (11/26 0121)   Borderline cardiomegaly, otherwise acute infiltrates or effusions          EKG, Pathology, and Other Studies Reviewed on Admission:   · EKG: CXR    Allscripts / Epic Records Reviewed: Yes     ** Please Note: This note has been constructed using a voice recognition system   **

## 2018-11-26 NOTE — ED PROVIDER NOTES
History  Chief Complaint   Patient presents with    Chest Pain     B/L upper chest pain, arms for 1 hour, SOB  25-year-old male with history of cardiac catheterization with single stent placement in 2014 presents emergency department for evaluation of sudden-onset anterior chest pain beginning 1 hour prior to arrival, radiating to bilateral upper arms  Patient states he is unaware symptoms are similar to prior cardiac disease  Pain began suddenly while at rest, has gradually worsened since onset, and worsens with exertion  Denies associated chills, sweats, fever, cough, back pain, nausea, vomiting, diarrhea, abdominal pain  He does not take any aspirin on a daily basis  Took one dose of SL nitro PTA without relief  On exam, he is diaphoretic, and appears to be in distress  He is not tachypneic and there is no respiratory distress  Heart rate is mildly elevated at 110, vital signs are otherwise unremarkable  Initial EKG shows minor T-wave flattening in V2 and V3, otherwise unchanged from previous EKG in March of 2018  Heart is regular without murmurs rubs or gallops, lungs are clear bilaterally without rales or rhonchi  Abdomen is soft and benign  Peripheral pulses are 2+ globally  Assessment/plan:  High level suspicion for acute coronary syndrome  Plan to obtain cardiac panel, troponin, chest x-ray, serial EKGs  Given his significant cardiac history, he will likely be admission regardless of the outcome of troponin or serial EKGs  History provided by:  Patient   used: Yes ([employer] [id number])        Prior to Admission Medications   Prescriptions Last Dose Informant Patient Reported?  Taking?   aspirin (ECOTRIN LOW STRENGTH) 81 mg EC tablet   Yes Yes   Sig: Take 81 mg by mouth daily   atorvastatin (LIPITOR) 40 mg tablet   Yes Yes   Sig: Take 40 mg by mouth daily   clopidogrel (PLAVIX) 75 mg tablet   Yes Yes   Sig: Take 75 mg by mouth daily   enalapril (VASOTEC) 10 mg tablet   Yes Yes   Sig: Take 10 mg by mouth daily   metoprolol succinate (TOPROL-XL) 25 mg 24 hr tablet   Yes Yes   Sig: Take 25 mg by mouth daily   naproxen (NAPROSYN) 500 mg tablet   No No   Sig: Take 1 tablet by mouth every 12 (twelve) hours as needed for mild pain or moderate pain   sitaGLIPtin-metFORMIN (JANUMET)  MG per tablet   Yes Yes   Sig: Take 1 tablet by mouth 2 (two) times a day with meals      Facility-Administered Medications: None       Past Medical History:   Diagnosis Date    Cardiac disease        Past Surgical History:   Procedure Laterality Date    CARDIAC CATHETERIZATION      CORONARY ANGIOPLASTY WITH STENT PLACEMENT         History reviewed  No pertinent family history  I have reviewed and agree with the history as documented  Social History   Substance Use Topics    Smoking status: Never Smoker    Smokeless tobacco: Never Used    Alcohol use Yes      Comment: socailly         Review of Systems   Constitutional: Positive for diaphoresis  Negative for appetite change, chills, fatigue and fever  Eyes: Negative for visual disturbance  Respiratory: Positive for chest tightness  Negative for cough and shortness of breath  Cardiovascular: Positive for chest pain  Negative for palpitations and leg swelling  Gastrointestinal: Negative for abdominal pain, diarrhea, nausea and vomiting  Musculoskeletal: Negative for arthralgias, back pain and myalgias  Skin: Negative for color change and rash  Allergic/Immunologic: Negative for immunocompromised state  Neurological: Negative for dizziness, light-headedness and headaches  Hematological: Does not bruise/bleed easily  Psychiatric/Behavioral: Negative for confusion  Physical Exam  Physical Exam   Constitutional: He is oriented to person, place, and time  He appears well-developed and well-nourished  No distress  HENT:   Head: Normocephalic and atraumatic     Mouth/Throat: Oropharynx is clear and moist    Eyes: Pupils are equal, round, and reactive to light  No scleral icterus  Neck: No JVD present  Cardiovascular: Normal rate and regular rhythm  Exam reveals no gallop and no friction rub  No murmur heard  Pulses:       Radial pulses are 2+ on the right side, and 2+ on the left side  Pulmonary/Chest: No respiratory distress  He has no wheezes  He has no rales  Abdominal: Soft  Bowel sounds are normal  He exhibits no distension  There is no tenderness  There is no guarding  Morbid obesity   Musculoskeletal: He exhibits no edema  Neurological: He is alert and oriented to person, place, and time  Skin: Skin is warm  Capillary refill takes less than 2 seconds  He is diaphoretic  Psychiatric: He has a normal mood and affect  His behavior is normal    Vitals reviewed        Vital Signs  ED Triage Vitals [11/26/18 0038]   Temperature Pulse Respirations Blood Pressure SpO2   98 1 °F (36 7 °C) (!) 113 20 135/88 96 %      Temp Source Heart Rate Source Patient Position - Orthostatic VS BP Location FiO2 (%)   Oral Monitor Sitting Left arm --      Pain Score       9           Vitals:    11/26/18 0119 11/26/18 0145 11/26/18 0200 11/26/18 0247   BP: 128/82 125/57 114/59 113/64   Pulse: 96 88 86 80   Patient Position - Orthostatic VS:    Lying       Visual Acuity      ED Medications  Medications   nitroglycerin (NITROSTAT) SL tablet 0 4 mg (0 4 mg Sublingual Given 11/26/18 0141)   aspirin chewable tablet 324 mg (324 mg Oral Given 11/26/18 0102)       Diagnostic Studies  Results Reviewed     Procedure Component Value Units Date/Time    Troponin I [[id number]]     Lab Status:  No result Specimen:  Blood     APTT [[id number]]  (Normal) Collected:  11/26/18 0046    Lab Status:  Final result Specimen:  Blood from Arm, Right Updated:  11/26/18 0123     PTT 27 seconds     Protime-INR [[id number]]  (Normal) Collected:  11/26/18 0046    Lab Status:  Final result Specimen:  Blood from Arm, Right Updated:  11/26/18 0123     Protime 13 6 seconds      INR 1 03    Troponin I [[id number]]  (Normal) Collected:  11/26/18 0046    Lab Status:  Final result Specimen:  Blood from Arm, Right Updated:  11/26/18 0119     Troponin I <0 02 ng/mL     Comprehensive metabolic panel [[id number]]  (Abnormal) Collected:  11/26/18 0046    Lab Status:  Final result Specimen:  Blood from Arm, Right Updated:  11/26/18 0117     Sodium 137 mmol/L      Potassium 3 5 mmol/L      Chloride 99 (L) mmol/L      CO2 25 mmol/L      ANION GAP 13 mmol/L      BUN 12 mg/dL      Creatinine 0 94 mg/dL      Glucose 218 (H) mg/dL      Calcium 9 5 mg/dL      AST 11 U/L      ALT 28 U/L      Alkaline Phosphatase 71 U/L      Total Protein 8 2 g/dL      Albumin 3 7 g/dL      Total Bilirubin 0 37 mg/dL      eGFR 105 ml/min/1 73sq m     Narrative:         National Kidney Disease Education Program recommendations are as follows:  GFR calculation is accurate only with a steady state creatinine  Chronic Kidney disease less than 60 ml/min/1 73 sq  meters  Kidney failure less than 15 ml/min/1 73 sq  meters      CBC and differential [[id number]] Collected:  11/26/18 0046    Lab Status:  Final result Specimen:  Blood from Arm, Right Updated:  11/26/18 0052     WBC 8 93 Thousand/uL      RBC 5 03 Million/uL      Hemoglobin 15 1 g/dL      Hematocrit 45 3 %      MCV 90 fL      MCH 30 0 pg      MCHC 33 3 g/dL      RDW 12 7 %      MPV 10 2 fL      Platelets 029 Thousands/uL      nRBC 0 /100 WBCs      Neutrophils Relative 75 %      Immat GRANS % 0 %      Lymphocytes Relative 18 %      Monocytes Relative 6 %      Eosinophils Relative 1 %      Basophils Relative 0 %      Neutrophils Absolute 6 67 Thousands/µL      Immature Grans Absolute 0 02 Thousand/uL      Lymphocytes Absolute 1 61 Thousands/µL      Monocytes Absolute 0 51 Thousand/µL      Eosinophils Absolute 0 10 Thousand/µL      Basophils Absolute 0 02 Thousands/µL                  X-ray chest 2 views   ED Interpretation by Tres Trammell PA-C (11/26 0121) Borderline cardiomegaly, otherwise acute infiltrates or effusions                 Procedures  ECG 12 Lead Documentation  Date/Time: 11/26/2018 12:45 AM  Performed by: Patricia Singh by: Krystian Gonzalez     Indications / Diagnosis:  Chest pain  ECG reviewed by me, the ED Provider: yes    Patient location:  ED  Previous ECG:     Previous ECG:  Compared to current    Similarity:  Changes noted  Interpretation:     Interpretation: abnormal    Rate:     ECG rate:  101    ECG rate assessment: tachycardic    Rhythm:     Rhythm: sinus tachycardia    Ectopy:     Ectopy: none    QRS:     QRS axis:  Normal    QRS intervals:  Normal  Conduction:     Conduction: normal    ST segments:     ST segments:  Normal  T waves:     T waves: flattening    Q waves:     Q waves:  V2 and V3  ECG 12 Lead Documentation  Date/Time: 11/26/2018 1:03 AM  Performed by: Patricia Singh by: Krystian Gonzalez     Indications / Diagnosis:  Chest pain  ECG reviewed by me, the ED Provider: yes    Patient location:  ED  Previous ECG:     Previous ECG:  Compared to current    Similarity:  Changes noted  Interpretation:     Interpretation: abnormal    Rate:     ECG rate:  107    ECG rate assessment: tachycardic    Rhythm:     Rhythm: sinus tachycardia    Ectopy:     Ectopy: none    QRS:     QRS axis:  Normal    QRS intervals:  Normal  ST segments:     ST segments:  Normal  T waves:     T waves: flattening      Flattening:  V3 and V2 (Increased from initial at [facility])           Phone Contacts  ED Phone Contact    ED Course  ED Course as of Nov 26 0251 Mon Nov 26, 2018   0059 ECG interpreted by me: normal rate, mild T wave flattening in V2-V3, otherwise no ST changes, no arrhythmias, normal QTc    0140 Trop negative  Pt states chest pain is resolved however arm pain is unchanged  Will give two doses of 0 4mg nitro SL q5 min, consider fentanyl if pain persists    Troponin I: <0 02   0147 Messaged SLIM for admission    0155 Serial EKGs without evolving changes          HEART Risk Score      Most Recent Value   History  2 Filed at: 11/26/2018 0120   ECG  1 Filed at: 11/26/2018 0120   Age  0 Filed at: 11/26/2018 0120   Risk Factors  1 Filed at: 11/26/2018 0120   Troponin  0 Filed at: 11/26/2018 0120   Heart Score Risk Calculator   History  2 Filed at: 11/26/2018 0120   ECG  1 Filed at: 11/26/2018 0120   Age  0 Filed at: 11/26/2018 0120   Risk Factors  1 Filed at: 11/26/2018 0120   Troponin  0 Filed at: 11/26/2018 0120   HEART Score  4 Filed at: 11/26/2018 0120   HEART Score  4 Filed at: 11/26/2018 0120                    NICK Risk Score      Most Recent Value   Age >= 72  0 Filed at: 11/26/2018 8485   Known CAD (stenosis >= 50%)  0 Filed at: 11/26/2018 7695   Recent (<=24 hrs) Service Angina  0 Filed at: 11/26/2018 0227   ST Deviation >= 0 5 mm  0 Filed at: 11/26/2018 0227   3+ CAD Risk Factors (FHx, HTN, HLP, DM, Smoker)  1 Filed at: 11/26/2018 1984   Aspirin Use Past 7 Days  1 Filed at: 11/26/2018 1013   Elevated Cardiac Markers  0 Filed at: 11/26/2018 0227   NICK Risk Score (Calculated)  2 Filed at: 11/26/2018 0227              MDM  Number of Diagnoses or Management Options  Chest pain: new and requires workup  H/O cardiac catheterization:   Diagnosis management comments: 77-year-old male with history of cardiac stent placement presents with acute chest pain  Serial EKGs were nonischemic, and initial troponin is negative  Given his high risk history, he will be admitted to observation for further trending of troponin and further cardiac rule out  Given sublingual nitroglycerin in the ED with significant improvement in pain         Amount and/or Complexity of Data Reviewed  Clinical lab tests: ordered and reviewed  Tests in the radiology section of CPT®: ordered and reviewed  Tests in the medicine section of CPT®: ordered and reviewed  Review and summarize past medical records: yes  Discuss the patient with other providers: yes  Independent visualization of images, tracings, or specimens: yes      CritCare Time    Disposition  Final diagnoses:   Chest pain   H/O cardiac catheterization     Time reflects when diagnosis was documented in both MDM as applicable and the Disposition within this note     Time User Action Codes Description Comment    11/26/2018  2:03 AM Sharon Score Add [R07 9] Chest pain     11/26/2018  2:04 AM Sharon Score Add [Z98 890] H/O cardiac catheterization     11/26/2018  2:47 AM Vaishali Sahu Add [E66 01] Morbid obesity due to excess calories [facility])       ED Disposition     ED Disposition Condition Comment    Admit  Case was discussed with Hannah Mejias NP and the patient's admission status was agreed to be Admission Status: observation status to the service of Dr Micah Brambila   Follow-up Information    None         Current Discharge Medication List      CONTINUE these medications which have NOT CHANGED    Details   aspirin (ECOTRIN LOW STRENGTH) 81 mg EC tablet Take 81 mg by mouth daily      atorvastatin (LIPITOR) 40 mg tablet Take 40 mg by mouth daily      clopidogrel (PLAVIX) 75 mg tablet Take 75 mg by mouth daily      enalapril (VASOTEC) 10 mg tablet Take 10 mg by mouth daily      metoprolol succinate (TOPROL-XL) 25 mg 24 hr tablet Take 25 mg by mouth daily      sitaGLIPtin-metFORMIN (JANUMET)  MG per tablet Take 1 tablet by mouth 2 (two) times a day with meals      naproxen (NAPROSYN) 500 mg tablet Take 1 tablet by mouth every 12 (twelve) hours as needed for mild pain or moderate pain  Qty: 15 tablet, Refills: 0           No discharge procedures on file      ED Provider  Electronically Signed by           Concepción Ayala PA-C  11/26/18 2239

## 2018-11-26 NOTE — ED NOTES
Called security for large cash patient has on him     Pt now wants the money to go home with his family member     Linnea Lay RN  11/26/18 8979

## 2019-11-03 ENCOUNTER — APPOINTMENT (EMERGENCY)
Dept: RADIOLOGY | Facility: HOSPITAL | Age: 34
DRG: 287 | End: 2019-11-03
Payer: COMMERCIAL

## 2019-11-03 ENCOUNTER — APPOINTMENT (OUTPATIENT)
Dept: CT IMAGING | Facility: HOSPITAL | Age: 34
DRG: 287 | End: 2019-11-03
Payer: COMMERCIAL

## 2019-11-03 ENCOUNTER — HOSPITAL ENCOUNTER (INPATIENT)
Facility: HOSPITAL | Age: 34
LOS: 1 days | Discharge: HOME | DRG: 287 | End: 2019-11-05
Attending: EMERGENCY MEDICINE | Admitting: STUDENT IN AN ORGANIZED HEALTH CARE EDUCATION/TRAINING PROGRAM
Payer: COMMERCIAL

## 2019-11-03 DIAGNOSIS — I31.9 PERICARDITIS: ICD-10-CM

## 2019-11-03 DIAGNOSIS — R07.9 CHEST PAIN: Primary | ICD-10-CM

## 2019-11-03 DIAGNOSIS — K21.9 GERD (GASTROESOPHAGEAL REFLUX DISEASE): ICD-10-CM

## 2019-11-03 DIAGNOSIS — E11.65 HYPERGLYCEMIA DUE TO TYPE 2 DIABETES MELLITUS (HCC): ICD-10-CM

## 2019-11-03 DIAGNOSIS — I10 ESSENTIAL HYPERTENSION: Chronic | ICD-10-CM

## 2019-11-03 DIAGNOSIS — I25.10 CORONARY ARTERY DISEASE INVOLVING NATIVE HEART WITHOUT ANGINA PECTORIS, UNSPECIFIED VESSEL OR LESION TYPE: ICD-10-CM

## 2019-11-03 DIAGNOSIS — E78.00 HYPERCHOLESTEROLEMIA: Chronic | ICD-10-CM

## 2019-11-03 DIAGNOSIS — E11.65 TYPE 2 DIABETES MELLITUS WITH HYPERGLYCEMIA, WITHOUT LONG-TERM CURRENT USE OF INSULIN (HCC): ICD-10-CM

## 2019-11-03 PROBLEM — Z91.19 NONCOMPLIANCE: Chronic | Status: ACTIVE | Noted: 2019-11-03

## 2019-11-03 PROBLEM — Z91.199 NONCOMPLIANCE: Chronic | Status: ACTIVE | Noted: 2019-11-03

## 2019-11-03 LAB
ALBUMIN SERPL BCP-MCNC: 3.7 G/DL (ref 3.5–5)
ALP SERPL-CCNC: 113 U/L (ref 46–116)
ALT SERPL W P-5'-P-CCNC: 32 U/L (ref 12–78)
ANION GAP SERPL CALCULATED.3IONS-SCNC: 14 MMOL/L (ref 4–13)
APTT PPP: 25 SECONDS (ref 23–37)
APTT PPP: 28 SECONDS (ref 23–37)
AST SERPL W P-5'-P-CCNC: 28 U/L (ref 5–45)
ATRIAL RATE: 100 BPM
ATRIAL RATE: 103 BPM
ATRIAL RATE: 187 BPM
BASE EX.OXY STD BLDV CALC-SCNC: 74.1 % (ref 60–80)
BASE EXCESS BLDV CALC-SCNC: -3.9 MMOL/L
BASOPHILS # BLD AUTO: 0.02 THOUSANDS/ΜL (ref 0–0.1)
BASOPHILS NFR BLD AUTO: 0 % (ref 0–1)
BILIRUB SERPL-MCNC: 0.41 MG/DL (ref 0.2–1)
BUN SERPL-MCNC: 10 MG/DL (ref 5–25)
CALCIUM SERPL-MCNC: 9.3 MG/DL (ref 8.3–10.1)
CHLORIDE SERPL-SCNC: 97 MMOL/L (ref 100–108)
CO2 SERPL-SCNC: 23 MMOL/L (ref 21–32)
CREAT SERPL-MCNC: 0.88 MG/DL (ref 0.6–1.3)
CRP SERPL QL: 16.3 MG/L
D DIMER PPP FEU-MCNC: 0.5 UG/ML FEU
EOSINOPHIL # BLD AUTO: 0.07 THOUSAND/ΜL (ref 0–0.61)
EOSINOPHIL NFR BLD AUTO: 1 % (ref 0–6)
ERYTHROCYTE [DISTWIDTH] IN BLOOD BY AUTOMATED COUNT: 12.6 % (ref 11.6–15.1)
ERYTHROCYTE [DISTWIDTH] IN BLOOD BY AUTOMATED COUNT: 12.7 % (ref 11.6–15.1)
ERYTHROCYTE [SEDIMENTATION RATE] IN BLOOD: 17 MM/HOUR (ref 0–10)
GFR SERPL CREATININE-BSD FRML MDRD: 111 ML/MIN/1.73SQ M
GLUCOSE SERPL-MCNC: 198 MG/DL (ref 65–140)
GLUCOSE SERPL-MCNC: 223 MG/DL (ref 65–140)
GLUCOSE SERPL-MCNC: 243 MG/DL (ref 65–140)
GLUCOSE SERPL-MCNC: 311 MG/DL (ref 65–140)
GLUCOSE SERPL-MCNC: 336 MG/DL (ref 65–140)
HCO3 BLDV-SCNC: 20.2 MMOL/L (ref 24–30)
HCT VFR BLD AUTO: 42.5 % (ref 36.5–49.3)
HCT VFR BLD AUTO: 46.8 % (ref 36.5–49.3)
HGB BLD-MCNC: 14.3 G/DL (ref 12–17)
HGB BLD-MCNC: 15.9 G/DL (ref 12–17)
IMM GRANULOCYTES # BLD AUTO: 0.06 THOUSAND/UL (ref 0–0.2)
IMM GRANULOCYTES NFR BLD AUTO: 1 % (ref 0–2)
INR PPP: 1.02 (ref 0.84–1.19)
LYMPHOCYTES # BLD AUTO: 2.27 THOUSANDS/ΜL (ref 0.6–4.47)
LYMPHOCYTES NFR BLD AUTO: 26 % (ref 14–44)
MCH RBC QN AUTO: 30.4 PG (ref 26.8–34.3)
MCH RBC QN AUTO: 30.9 PG (ref 26.8–34.3)
MCHC RBC AUTO-ENTMCNC: 33.6 G/DL (ref 31.4–37.4)
MCHC RBC AUTO-ENTMCNC: 34 G/DL (ref 31.4–37.4)
MCV RBC AUTO: 90 FL (ref 82–98)
MCV RBC AUTO: 91 FL (ref 82–98)
MONOCYTES # BLD AUTO: 0.47 THOUSAND/ΜL (ref 0.17–1.22)
MONOCYTES NFR BLD AUTO: 6 % (ref 4–12)
NEUTROPHILS # BLD AUTO: 5.71 THOUSANDS/ΜL (ref 1.85–7.62)
NEUTS SEG NFR BLD AUTO: 66 % (ref 43–75)
NRBC BLD AUTO-RTO: 0 /100 WBCS
NT-PROBNP SERPL-MCNC: 18 PG/ML
O2 CT BLDV-SCNC: 15 ML/DL
P AXIS: 62 DEGREES
P AXIS: 70 DEGREES
PCO2 BLDV: 34.3 MM HG (ref 42–50)
PH BLDV: 7.39 [PH] (ref 7.3–7.4)
PLATELET # BLD AUTO: 271 THOUSANDS/UL (ref 149–390)
PLATELET # BLD AUTO: 311 THOUSANDS/UL (ref 149–390)
PMV BLD AUTO: 10.4 FL (ref 8.9–12.7)
PMV BLD AUTO: 10.7 FL (ref 8.9–12.7)
PO2 BLDV: 42.1 MM HG (ref 35–45)
POTASSIUM SERPL-SCNC: 3.6 MMOL/L (ref 3.5–5.3)
PR INTERVAL: 142 MS
PR INTERVAL: 148 MS
PROT SERPL-MCNC: 8.6 G/DL (ref 6.4–8.2)
PROTHROMBIN TIME: 13.5 SECONDS (ref 11.6–14.5)
QRS AXIS: 52 DEGREES
QRS AXIS: 55 DEGREES
QRS AXIS: 65 DEGREES
QRSD INTERVAL: 82 MS
QRSD INTERVAL: 82 MS
QRSD INTERVAL: 84 MS
QT INTERVAL: 312 MS
QT INTERVAL: 318 MS
QT INTERVAL: 352 MS
QTC INTERVAL: 396 MS
QTC INTERVAL: 416 MS
QTC INTERVAL: 454 MS
RBC # BLD AUTO: 4.71 MILLION/UL (ref 3.88–5.62)
RBC # BLD AUTO: 5.15 MILLION/UL (ref 3.88–5.62)
SODIUM SERPL-SCNC: 134 MMOL/L (ref 136–145)
T WAVE AXIS: 14 DEGREES
T WAVE AXIS: 35 DEGREES
T WAVE AXIS: 46 DEGREES
TROPONIN I SERPL-MCNC: 0.08 NG/ML
TROPONIN I SERPL-MCNC: 0.38 NG/ML
TROPONIN I SERPL-MCNC: 2.38 NG/ML
TROPONIN I SERPL-MCNC: 6.72 NG/ML
TROPONIN I SERPL-MCNC: <0.02 NG/ML
VENTRICULAR RATE: 100 BPM
VENTRICULAR RATE: 103 BPM
VENTRICULAR RATE: 97 BPM
WBC # BLD AUTO: 11.04 THOUSAND/UL (ref 4.31–10.16)
WBC # BLD AUTO: 8.6 THOUSAND/UL (ref 4.31–10.16)

## 2019-11-03 PROCEDURE — 99284 EMERGENCY DEPT VISIT MOD MDM: CPT | Performed by: EMERGENCY MEDICINE

## 2019-11-03 PROCEDURE — 85652 RBC SED RATE AUTOMATED: CPT | Performed by: INTERNAL MEDICINE

## 2019-11-03 PROCEDURE — 85025 COMPLETE CBC W/AUTO DIFF WBC: CPT | Performed by: EMERGENCY MEDICINE

## 2019-11-03 PROCEDURE — 86140 C-REACTIVE PROTEIN: CPT | Performed by: INTERNAL MEDICINE

## 2019-11-03 PROCEDURE — 85027 COMPLETE CBC AUTOMATED: CPT | Performed by: FAMILY MEDICINE

## 2019-11-03 PROCEDURE — 99244 OFF/OP CNSLTJ NEW/EST MOD 40: CPT | Performed by: INTERNAL MEDICINE

## 2019-11-03 PROCEDURE — 83880 ASSAY OF NATRIURETIC PEPTIDE: CPT | Performed by: NURSE PRACTITIONER

## 2019-11-03 PROCEDURE — 99220 PR INITIAL OBSERVATION CARE/DAY 70 MINUTES: CPT | Performed by: FAMILY MEDICINE

## 2019-11-03 PROCEDURE — 99285 EMERGENCY DEPT VISIT HI MDM: CPT

## 2019-11-03 PROCEDURE — 36415 COLL VENOUS BLD VENIPUNCTURE: CPT | Performed by: EMERGENCY MEDICINE

## 2019-11-03 PROCEDURE — 96374 THER/PROPH/DIAG INJ IV PUSH: CPT

## 2019-11-03 PROCEDURE — 90686 IIV4 VACC NO PRSV 0.5 ML IM: CPT | Performed by: INTERNAL MEDICINE

## 2019-11-03 PROCEDURE — 84484 ASSAY OF TROPONIN QUANT: CPT | Performed by: FAMILY MEDICINE

## 2019-11-03 PROCEDURE — 80053 COMPREHEN METABOLIC PANEL: CPT | Performed by: EMERGENCY MEDICINE

## 2019-11-03 PROCEDURE — 84484 ASSAY OF TROPONIN QUANT: CPT | Performed by: INTERNAL MEDICINE

## 2019-11-03 PROCEDURE — 82805 BLOOD GASES W/O2 SATURATION: CPT | Performed by: EMERGENCY MEDICINE

## 2019-11-03 PROCEDURE — 82948 REAGENT STRIP/BLOOD GLUCOSE: CPT

## 2019-11-03 PROCEDURE — 71045 X-RAY EXAM CHEST 1 VIEW: CPT

## 2019-11-03 PROCEDURE — 85730 THROMBOPLASTIN TIME PARTIAL: CPT | Performed by: FAMILY MEDICINE

## 2019-11-03 PROCEDURE — 93010 ELECTROCARDIOGRAM REPORT: CPT | Performed by: INTERNAL MEDICINE

## 2019-11-03 PROCEDURE — 85379 FIBRIN DEGRADATION QUANT: CPT | Performed by: INTERNAL MEDICINE

## 2019-11-03 PROCEDURE — 93005 ELECTROCARDIOGRAM TRACING: CPT

## 2019-11-03 PROCEDURE — 85610 PROTHROMBIN TIME: CPT | Performed by: FAMILY MEDICINE

## 2019-11-03 PROCEDURE — 85730 THROMBOPLASTIN TIME PARTIAL: CPT | Performed by: INTERNAL MEDICINE

## 2019-11-03 PROCEDURE — 84484 ASSAY OF TROPONIN QUANT: CPT | Performed by: EMERGENCY MEDICINE

## 2019-11-03 PROCEDURE — 71275 CT ANGIOGRAPHY CHEST: CPT

## 2019-11-03 RX ORDER — ASPIRIN 81 MG/1
81 TABLET, CHEWABLE ORAL ONCE
Status: COMPLETED | OUTPATIENT
Start: 2019-11-03 | End: 2019-11-03

## 2019-11-03 RX ORDER — HEPARIN SODIUM 10000 [USP'U]/100ML
3-20 INJECTION, SOLUTION INTRAVENOUS
Status: DISCONTINUED | OUTPATIENT
Start: 2019-11-03 | End: 2019-11-04

## 2019-11-03 RX ORDER — ACETAMINOPHEN 325 MG/1
650 TABLET ORAL EVERY 4 HOURS PRN
Status: DISCONTINUED | OUTPATIENT
Start: 2019-11-03 | End: 2019-11-05 | Stop reason: HOSPADM

## 2019-11-03 RX ORDER — METOPROLOL SUCCINATE 50 MG/1
50 TABLET, EXTENDED RELEASE ORAL DAILY
Status: DISCONTINUED | OUTPATIENT
Start: 2019-11-03 | End: 2019-11-05 | Stop reason: HOSPADM

## 2019-11-03 RX ORDER — ATORVASTATIN CALCIUM 40 MG/1
40 TABLET, FILM COATED ORAL
Status: DISCONTINUED | OUTPATIENT
Start: 2019-11-03 | End: 2019-11-05 | Stop reason: HOSPADM

## 2019-11-03 RX ORDER — ASPIRIN 81 MG/1
243 TABLET, CHEWABLE ORAL ONCE
Status: COMPLETED | OUTPATIENT
Start: 2019-11-03 | End: 2019-11-03

## 2019-11-03 RX ORDER — SODIUM CHLORIDE 9 MG/ML
125 INJECTION, SOLUTION INTRAVENOUS CONTINUOUS
Status: DISCONTINUED | OUTPATIENT
Start: 2019-11-03 | End: 2019-11-03

## 2019-11-03 RX ORDER — MORPHINE SULFATE 4 MG/ML
6 INJECTION, SOLUTION INTRAMUSCULAR; INTRAVENOUS ONCE
Status: COMPLETED | OUTPATIENT
Start: 2019-11-03 | End: 2019-11-03

## 2019-11-03 RX ORDER — HEPARIN SODIUM 1000 [USP'U]/ML
4000 INJECTION, SOLUTION INTRAVENOUS; SUBCUTANEOUS ONCE
Status: COMPLETED | OUTPATIENT
Start: 2019-11-03 | End: 2019-11-03

## 2019-11-03 RX ORDER — ASPIRIN 81 MG/1
81 TABLET ORAL DAILY
Status: DISCONTINUED | OUTPATIENT
Start: 2019-11-03 | End: 2019-11-03

## 2019-11-03 RX ORDER — CLOPIDOGREL BISULFATE 75 MG/1
225 TABLET ORAL ONCE
Status: COMPLETED | OUTPATIENT
Start: 2019-11-03 | End: 2019-11-03

## 2019-11-03 RX ORDER — MAGNESIUM HYDROXIDE/ALUMINUM HYDROXICE/SIMETHICONE 120; 1200; 1200 MG/30ML; MG/30ML; MG/30ML
30 SUSPENSION ORAL EVERY 6 HOURS PRN
Status: DISCONTINUED | OUTPATIENT
Start: 2019-11-03 | End: 2019-11-05 | Stop reason: HOSPADM

## 2019-11-03 RX ORDER — CLOPIDOGREL BISULFATE 75 MG/1
75 TABLET ORAL DAILY
Status: DISCONTINUED | OUTPATIENT
Start: 2019-11-03 | End: 2019-11-05

## 2019-11-03 RX ORDER — ASPIRIN 325 MG
325 TABLET ORAL 3 TIMES DAILY
Status: DISCONTINUED | OUTPATIENT
Start: 2019-11-04 | End: 2019-11-03

## 2019-11-03 RX ORDER — LISINOPRIL 10 MG/1
10 TABLET ORAL DAILY
Status: DISCONTINUED | OUTPATIENT
Start: 2019-11-03 | End: 2019-11-05 | Stop reason: HOSPADM

## 2019-11-03 RX ORDER — ACETAMINOPHEN 325 MG/1
650 TABLET ORAL EVERY 6 HOURS PRN
Status: DISCONTINUED | OUTPATIENT
Start: 2019-11-03 | End: 2019-11-03

## 2019-11-03 RX ORDER — NITROGLYCERIN 0.4 MG/1
0.4 TABLET SUBLINGUAL
Status: DISCONTINUED | OUTPATIENT
Start: 2019-11-03 | End: 2019-11-05 | Stop reason: HOSPADM

## 2019-11-03 RX ORDER — HEPARIN SODIUM 1000 [USP'U]/ML
2000 INJECTION, SOLUTION INTRAVENOUS; SUBCUTANEOUS AS NEEDED
Status: DISCONTINUED | OUTPATIENT
Start: 2019-11-03 | End: 2019-11-04

## 2019-11-03 RX ORDER — HEPARIN SODIUM 1000 [USP'U]/ML
4000 INJECTION, SOLUTION INTRAVENOUS; SUBCUTANEOUS AS NEEDED
Status: DISCONTINUED | OUTPATIENT
Start: 2019-11-03 | End: 2019-11-04

## 2019-11-03 RX ORDER — MORPHINE SULFATE 10 MG/ML
6 INJECTION, SOLUTION INTRAMUSCULAR; INTRAVENOUS ONCE AS NEEDED
Status: DISCONTINUED | OUTPATIENT
Start: 2019-11-03 | End: 2019-11-03

## 2019-11-03 RX ORDER — ONDANSETRON 2 MG/ML
4 INJECTION INTRAMUSCULAR; INTRAVENOUS EVERY 6 HOURS PRN
Status: DISCONTINUED | OUTPATIENT
Start: 2019-11-03 | End: 2019-11-05 | Stop reason: HOSPADM

## 2019-11-03 RX ORDER — ASPIRIN 325 MG
325 TABLET ORAL 3 TIMES DAILY
Status: DISCONTINUED | OUTPATIENT
Start: 2019-11-03 | End: 2019-11-05 | Stop reason: HOSPADM

## 2019-11-03 RX ORDER — KETOROLAC TROMETHAMINE 30 MG/ML
30 INJECTION, SOLUTION INTRAMUSCULAR; INTRAVENOUS ONCE
Status: DISCONTINUED | OUTPATIENT
Start: 2019-11-03 | End: 2019-11-03

## 2019-11-03 RX ADMIN — INSULIN LISPRO 8 UNITS: 100 INJECTION, SOLUTION INTRAVENOUS; SUBCUTANEOUS at 10:17

## 2019-11-03 RX ADMIN — INSULIN LISPRO 4 UNITS: 100 INJECTION, SOLUTION INTRAVENOUS; SUBCUTANEOUS at 12:53

## 2019-11-03 RX ADMIN — CLOPIDOGREL BISULFATE 225 MG: 75 TABLET ORAL at 12:50

## 2019-11-03 RX ADMIN — HEPARIN SODIUM AND DEXTROSE 11.1 UNITS/KG/HR: 10000; 5 INJECTION INTRAVENOUS at 14:00

## 2019-11-03 RX ADMIN — NITROGLYCERIN 1 INCH: 20 OINTMENT TOPICAL at 19:50

## 2019-11-03 RX ADMIN — NITROGLYCERIN 0.4 MG: 0.4 TABLET SUBLINGUAL at 07:55

## 2019-11-03 RX ADMIN — NITROGLYCERIN 1 INCH: 20 OINTMENT TOPICAL at 05:14

## 2019-11-03 RX ADMIN — METOPROLOL SUCCINATE 50 MG: 50 TABLET, EXTENDED RELEASE ORAL at 10:17

## 2019-11-03 RX ADMIN — INFLUENZA VIRUS VACCINE 0.5 ML: 15; 15; 15; 15 SUSPENSION INTRAMUSCULAR at 12:51

## 2019-11-03 RX ADMIN — ACETAMINOPHEN 650 MG: 325 TABLET ORAL at 16:13

## 2019-11-03 RX ADMIN — ASPIRIN 81 MG 81 MG: 81 TABLET ORAL at 05:23

## 2019-11-03 RX ADMIN — HEPARIN SODIUM 4000 UNITS: 1000 INJECTION, SOLUTION INTRAVENOUS; SUBCUTANEOUS at 21:31

## 2019-11-03 RX ADMIN — HEPARIN SODIUM 4000 UNITS: 1000 INJECTION, SOLUTION INTRAVENOUS; SUBCUTANEOUS at 12:50

## 2019-11-03 RX ADMIN — IOHEXOL 85 ML: 350 INJECTION, SOLUTION INTRAVENOUS at 12:30

## 2019-11-03 RX ADMIN — NITROGLYCERIN 0.4 MG: 0.4 TABLET SUBLINGUAL at 08:00

## 2019-11-03 RX ADMIN — ASPIRIN 325 MG ORAL TABLET 325 MG: 325 PILL ORAL at 17:25

## 2019-11-03 RX ADMIN — ASPIRIN 81 MG 243 MG: 81 TABLET ORAL at 05:15

## 2019-11-03 RX ADMIN — ASPIRIN 325 MG ORAL TABLET 325 MG: 325 PILL ORAL at 21:30

## 2019-11-03 RX ADMIN — CLOPIDOGREL BISULFATE 75 MG: 75 TABLET ORAL at 10:17

## 2019-11-03 RX ADMIN — ATORVASTATIN CALCIUM 40 MG: 40 TABLET, FILM COATED ORAL at 16:13

## 2019-11-03 RX ADMIN — MORPHINE SULFATE 6 MG: 4 INJECTION INTRAVENOUS at 05:20

## 2019-11-03 RX ADMIN — LISINOPRIL 10 MG: 10 TABLET ORAL at 10:17

## 2019-11-03 RX ADMIN — INSULIN LISPRO 4 UNITS: 100 INJECTION, SOLUTION INTRAVENOUS; SUBCUTANEOUS at 16:19

## 2019-11-03 NOTE — UTILIZATION REVIEW
Initial Clinical Review    Admission: Date/Time/Statement:   11-03-19  @ 0615  Orders Placed This Encounter   Procedures    Place in Observation (expected length of stay for this patient is less than two midnights)     Standing Status:   Standing     Number of Occurrences:   1     Order Specific Question:   Admitting Physician     Answer:   Miranda Loza [[id number]]     Order Specific Question:   Level of Care     Answer:   Med Surg [[id number]]     ED Arrival Information     Expected Arrival Acuity Means of Arrival Escorted By Service Admission Type    - 11/3/2019 04:58 Emergent Walk-In Family Member General Medicine Emergency    Arrival Complaint    chest pain        Chief Complaint   Patient presents with    Chest Pain     Pt reports "chest pain for past 30 minutes" also admits to ETOH     Assessment/Plan:   a 27year old male with a PMH of MI x 2014, hypertension, hyperlipidemia, type II DM, morbid obesity presenting with substernal chest pain x 30 minutes  Associated SOB  Pt is diaphoretic and in distress  Took 2 Nitro SL PTA without relief  No history of DVT, leg swelling, hemoptysis  Denies lightheadedness, dizziness, n/v  Complaint of ongoing/constant severe chest pain which was reproducible with palpation and deep inspiration  He does state that both of his arms hurt as well without any radiation to the jaw  He admits to some"shortness of breath, denies palpitations, dizziness, lower extremity edema, orthopnea  Chest pain noted @ 1611 placed on heparin drip    ED Triage Vitals   Temperature Pulse Respirations Blood Pressure SpO2   11/03/19 0508 11/03/19 0508 11/03/19 0508 11/03/19 0508 11/03/19 0508   98 2 °F (36 8 °C) 105 20 132/81 99 %      Temp Source Heart Rate Source Patient Position - Orthostatic VS BP Location FiO2 (%)   11/03/19 0508 11/03/19 0545 11/03/19 0700 11/03/19 0700 --   Oral Monitor Lying Right arm       Pain Score       11/03/19 0508       Worst Possible Pain        Wt Readings from Last 1 Encounters:   11/03/19 122 kg (269 lb 2 9 oz)     Additional Vital Signs:   1/03/19 0700  96 5 °F (35 8 °C)Abnormal   98  24Abnormal   159/88  96 %  Nasal cannula       Pertinent Labs/Diagnostic Test Results:   Results from last 7 days   Lab Units 11/03/19  0513   WBC Thousand/uL 8 60   HEMOGLOBIN g/dL 15 9   HEMATOCRIT % 46 8   PLATELETS Thousands/uL 311   NEUTROS ABS Thousands/µL 5 71         Results from last 7 days   Lab Units 11/03/19  0513   SODIUM mmol/L 134*   POTASSIUM mmol/L 3 6   CHLORIDE mmol/L 97*   CO2 mmol/L 23   ANION GAP mmol/L 14*   BUN mg/dL 10   CREATININE mg/dL 0 88   EGFR ml/min/1 73sq m 111   CALCIUM mg/dL 9 3     Results from last 7 days   Lab Units 11/03/19  0513   AST U/L 28   ALT U/L 32   ALK PHOS U/L 113   TOTAL PROTEIN g/dL 8 6*   ALBUMIN g/dL 3 7   TOTAL BILIRUBIN mg/dL 0 41     Results from last 7 days   Lab Units 11/03/19  0827   POC GLUCOSE mg/dl 311*     Results from last 7 days   Lab Units 11/03/19  0513   GLUCOSE RANDOM mg/dL 336*     Results from last 7 days   Lab Units 11/03/19  0551   PH PAULA  7 388   PCO2 PAULA mm Hg 34 3*   PO2 PAULA mm Hg 42 1   HCO3 PAULA mmol/L 20 2*   BASE EXC PAULA mmol/L -3 9   O2 CONTENT PAULA ml/dL 15 0   O2 HGB, VENOUS % 74 1     Results from last 7 days   Lab Units 11/03/19  0833 11/03/19  0513   TROPONIN I ng/mL 0 08* <0 02           ED Treatment:   Medication Administration from 11/03/2019 0458 to 11/03/2019 9091       Date/Time Order Dose Route Action Action by Comments     11/03/2019 0514 nitroglycerin (NITRO-BID) 2 % TD ointment 1 inch 1 inch Topical Given Jeison Hankins RN      11/03/2019 0520 morphine (PF) 4 mg/mL injection 6 mg 6 mg Intravenous Given Jeison Hankins RN      11/03/2019 0515 aspirin chewable tablet 243 mg 243 mg Oral Given Jeison Hankins RN      11/03/2019 2178 aspirin chewable tablet 81 mg 81 mg Oral Given Jeison Hankins RN         Past Medical History:   Diagnosis Date    Cardiac disease      Present on Admission:   Type 2 diabetes mellitus with hyperglycemia, without long-term current use of insulin (HCC)   Essential hypertension   Hypercholesterolemia      Admitting Diagnosis: Chest pain [R07 9]  Hyperglycemia due to type 2 diabetes mellitus ([facility] [address] ) [E11 65]  Age/Sex: 27 y o  male  Admission Orders:  Scheduled Medications:    Medications:  aspirin 81 mg Oral Daily   atorvastatin 40 mg Oral Daily With Dinner   clopidogrel 75 mg Oral Daily   influenza vaccine 0 5 mL Intramuscular Once   insulin lispro 2-12 Units Subcutaneous Q6H [address]   lisinopril 10 mg Oral Daily   metoprolol succinate 50 mg Oral Daily     Continuous IV Infusions:     PRN Meds:    acetaminophen 650 mg Oral Q4H PRN   aluminum-magnesium hydroxide-simethicone 30 mL Oral Q6H PRN   morphine injection 1 mg Intravenous Q4H PRN   nitroglycerin 0 4 mg Sublingual Q5 Min PRN   ondansetron 4 mg Intravenous Q6H PRN       IP CONSULT TO CARDIOLOGY  IP CONSULT TO CASE MANAGEMENT   Telemetry  scd  ECHO  TELEMETRY        Network Utilization Review Department  [email or web address]  ATTENTION: Please call with any questions or concerns to [phone] and carefully listen to the prompts so that you are directed to the right person  All voicemails are confidential   Hayde Hess all requests for admission clinical reviews, approved or denied determinations and any other requests to dedicated fax number below belonging to the campus where the patient is receiving treatment    FACILITY NAME UR FAX NUMBER   ADMISSION DENIALS (Administrative/Medical Necessity) [phone]   PARENT CHILD HEALTH (Maternity/NICU/Pediatrics) [phone]   [facility] [address] [facility] [phone]   [facility]bin Cro[facility] [address] [phone] [address] [place] [address] [phone]

## 2019-11-03 NOTE — NURSING NOTE
Patient sleeping on stomach when I arrive to do admission assessment  Wife at bedside and using google  with RN  RN woke patient up and he complaints of 20/10 chest pain, he places his hand over his chest when he states this  Patient describes chest pain as hot,burning and pushing  He states it is not coming from his stomach and he does not feel it in his throat  2 Sublingual Nitro's given 5 mins apart  Patient closes his eyes and goes back to sleep between doses  Lungs are clear to auscultation and Heart rate regular  SR on Tele  Patient does state chest hurts with deep breath  2nd trop and EKG due at 0815, will obtain and continue to monitor  Cardiology consult has been ordered

## 2019-11-03 NOTE — PLAN OF CARE
Problem: Potential for Falls  Goal: Patient will remain free of falls  Description  INTERVENTIONS:  - Assess patient frequently for physical needs  -  Identify cognitive and physical deficits and behaviors that affect risk of falls    -  [place] fall precautions as indicated by assessment   - Educate patient/family on patient safety including physical limitations  - Instruct patient to call for assistance with activity based on assessment  - Modify environment to reduce risk of injury  - Consider OT/PT consult to assist with strengthening/mobility  Outcome: Progressing     Problem: CARDIOVASCULAR - ADULT  Goal: Maintains optimal cardiac output and hemodynamic stability  Description  INTERVENTIONS:  - Monitor I/O, vital signs and rhythm  - Monitor for S/S and trends of decreased cardiac output  - Administer and titrate ordered vasoactive medications to optimize hemodynamic stability  - Assess quality of pulses, skin color and temperature  - Assess for signs of decreased coronary artery perfusion  - Instruct patient to report change in severity of symptoms  Outcome: Progressing  Goal: Absence of cardiac dysrhythmias or at baseline rhythm  Description  INTERVENTIONS:  - Continuous cardiac monitoring, vital signs, obtain 12 lead EKG if ordered  - Administer antiarrhythmic and heart rate control medications as ordered  - Monitor electrolytes and administer replacement therapy as ordered  Outcome: Progressing     Problem: METABOLIC, FLUID AND ELECTROLYTES - ADULT  Goal: Glucose maintained within target range  Description  INTERVENTIONS:  - Monitor Blood Glucose as ordered  - Assess for signs and symptoms of hyperglycemia and hypoglycemia  - Administer ordered medications to maintain glucose within target range  - Assess nutritional intake and initiate nutrition service referral as needed  Outcome: Progressing

## 2019-11-03 NOTE — ASSESSMENT & PLAN NOTE
Lab Results   Component Value Date    HGBA1C 8 3 (H) 11/26/2018     · Uncontrolled DM,   · A1c ordered   · Hold Janumet  · Start Accu-Cheks and sliding scale  · ADA diet

## 2019-11-03 NOTE — ASSESSMENT & PLAN NOTE
Reports chest pain radiating to both arms associated with shortness of breath  Troponin neg, trend x3  EKG: ST abnormalities; serial EKGs  NICK SCORE 3  S/p ASA bolus, morphine and NTG; no improvement in chest pain  Check lipid panel and A1c  Monitor on telemetry  BNP ordered  Cardiology consult    · H/o MI 2014, S/p cardiac cath, 100% stenosis OM, 95% stenosis mid circumflex, s/p JESUS MANUEL x2, on DAPT/ASA and plavix

## 2019-11-03 NOTE — ED PROVIDER NOTES
History  Chief Complaint   Patient presents with    Chest Pain     Pt reports "chest pain for past 30 minutes" also admits to ETOH     Pt is a 27year old male with a PMH of MI x 2014, hypertension, hyperlipidemia, type II DM, morbid obesity presenting with substernal chest pain x 30 minutes  Associated SOB  Pt is diaphoretic and in distress  Took 2 Nitro SL PTA without relief  No history of DVT, leg swelling, hemoptysis  Denies lightheadedness, dizziness, n/v            Prior to Admission Medications   Prescriptions Last Dose Informant Patient Reported? Taking?   acetaminophen (TYLENOL) 325 mg tablet Not Taking at Unknown time  No No   Si mg q 6 hours p r n   Pain/fever   Patient not taking: Reported on 11/3/2019   aspirin (ECOTRIN LOW STRENGTH) 81 mg EC tablet Not Taking at Unknown time  No No   Sig: Take 1 tablet (81 mg total) by mouth daily   Patient not taking: Reported on 11/3/2019   clopidogrel (PLAVIX) 75 mg tablet Not Taking at Unknown time  No No   Sig: Take 1 tablet (75 mg total) by mouth daily   Patient not taking: Reported on 11/3/2019   enalapril (VASOTEC) 2 5 mg tablet Not Taking at Unknown time  No No   Sig: Take 1 tablet (2 5 mg total) by mouth daily   Patient not taking: Reported on 11/3/2019   ezetimibe (ZETIA) 10 mg tablet Not Taking at Unknown time  Yes No   Sig: Take 10 mg by mouth daily   metoprolol succinate (TOPROL-XL) 50 mg 24 hr tablet Not Taking at Unknown time  No No   Sig: Take 1 tablet (50 mg total) by mouth daily   Patient not taking: Reported on 11/3/2019   naproxen (NAPROSYN) 500 mg tablet Not Taking at Unknown time  No No   Sig: Take 1 tablet (500 mg total) by mouth every 12 (twelve) hours as needed for mild pain or moderate pain   Patient not taking: Reported on 11/3/2019   rosuvastatin (CRESTOR) 40 MG tablet Not Taking at Unknown time  No No   Sig: Take 1 tablet (40 mg total) by mouth daily at bedtime   Patient not taking: Reported on 11/3/2019   sitaGLIPtin-metFORMIN (JANUMET)  MG per tablet Not Taking at Unknown time  No No   Sig: Take 1 tablet by mouth 2 (two) times a day with meals   Patient not taking: Reported on 11/3/2019      Facility-Administered Medications: None       Past Medical History:   Diagnosis Date    Cardiac disease        Past Surgical History:   Procedure Laterality Date    CARDIAC CATHETERIZATION      CORONARY ANGIOPLASTY WITH STENT PLACEMENT         History reviewed  No pertinent family history  I have reviewed and agree with the history as documented  Social History     Tobacco Use    Smoking status: Never Smoker    Smokeless tobacco: Never Used   Substance Use Topics    Alcohol use: Yes     Comment: socailly     Drug use: No        Review of Systems   Constitutional: Negative for chills, diaphoresis and fever  Respiratory: Positive for shortness of breath  Negative for cough  Cardiovascular: Positive for chest pain  Negative for leg swelling  Gastrointestinal: Negative for nausea and vomiting  Genitourinary: Negative  Musculoskeletal: Positive for back pain  Neurological: Negative for dizziness and light-headedness  Physical Exam  Physical Exam   Constitutional: He is oriented to person, place, and time  He appears well-developed and well-nourished  He appears distressed  HENT:   Head: Normocephalic and atraumatic  Eyes: Conjunctivae and EOM are normal    Neck: Normal range of motion  Neck supple  Cardiovascular: Regular rhythm, normal heart sounds and intact distal pulses  Tachycardia present  Pulmonary/Chest: Effort normal and breath sounds normal  He exhibits tenderness  Sternal chest wall tenderness without erythema, ecchymosis, swelling, crepitus  Musculoskeletal: Normal range of motion  Neurological: He is alert and oriented to person, place, and time  Skin: Skin is warm and dry  He is not diaphoretic         Vital Signs  ED Triage Vitals   Temperature Pulse Respirations Blood Pressure SpO2 11/03/19 0508 11/03/19 0508 11/03/19 0508 11/03/19 0508 11/03/19 0508   98 2 °F (36 8 °C) 105 20 132/81 99 %      Temp Source Heart Rate Source Patient Position - Orthostatic VS BP Location FiO2 (%)   11/03/19 0508 11/03/19 0545 -- -- --   Oral Monitor         Pain Score       11/03/19 0508       Worst Possible Pain           Vitals:    11/03/19 0508 11/03/19 0545   BP: 132/81 126/70   Pulse: 105 98         Visual Acuity      ED Medications  Medications   ketorolac (TORADOL) injection 30 mg (has no administration in time range)   nitroglycerin (NITRO-BID) 2 % TD ointment 1 inch (1 inch Topical Given 11/3/19 0514)   morphine (PF) 4 mg/mL injection 6 mg (6 mg Intravenous Given 11/3/19 0520)   aspirin chewable tablet 243 mg (243 mg Oral Given 11/3/19 0515)   aspirin chewable tablet 81 mg (81 mg Oral Given 11/3/19 0523)       Diagnostic Studies  Results Reviewed     Procedure Component Value Units Date/Time    Blood gas, venous [[id number]]  (Abnormal) Collected:  11/03/19 0551    Lab Status:  Final result Specimen:  Blood from Arm, Left Updated:  11/03/19 0558     pH, Yong 7 388     pCO2, Yong 34 3 mm Hg      pO2, Yong 42 1 mm Hg      HCO3, Yong 20 2 mmol/L      Base Excess, Yong -3 9 mmol/L      O2 Content, Yong 15 0 ml/dL      O2 HGB, VENOUS 74 1 %     Troponin I [[id number]]  (Normal) Collected:  11/03/19 0513    Lab Status:  Final result Specimen:  Blood from Arm, Left Updated:  11/03/19 0541     Troponin I <0 02 ng/mL     Comprehensive metabolic panel [[id number]]  (Abnormal) Collected:  11/03/19 0513    Lab Status:  Final result Specimen:  Blood from Arm, Left Updated:  11/03/19 0541     Sodium 134 mmol/L      Potassium 3 6 mmol/L      Chloride 97 mmol/L      CO2 23 mmol/L      ANION GAP 14 mmol/L      BUN 10 mg/dL      Creatinine 0 88 mg/dL      Glucose 336 mg/dL      Calcium 9 3 mg/dL      AST 28 U/L      ALT 32 U/L      Alkaline Phosphatase 113 U/L      Total Protein 8 6 g/dL      Albumin 3 7 g/dL      Total Bilirubin 0 41 mg/dL      eGFR 111 ml/min/1 73sq m     Narrative:       [place] guidelines for Chronic Kidney Disease (CKD):     Stage 1 with normal or high GFR (GFR > 90 mL/min/1 73 square meters)    Stage 2 Mild CKD (GFR = 60-89 mL/min/1 73 square meters)    Stage 3A Moderate CKD (GFR = 45-59 mL/min/1 73 square meters)    Stage 3B Moderate CKD (GFR = 30-44 mL/min/1 73 square meters)    Stage 4 Severe CKD (GFR = 15-29 mL/min/1 73 square meters)    Stage 5 End Stage CKD (GFR <15 mL/min/1 73 square meters)  Note: GFR calculation is accurate only with a steady state creatinine    CBC and differential [[id number]] Collected:  11/03/19 0513    Lab Status:  Final result Specimen:  Blood from Arm, Left Updated:  11/03/19 0521     WBC 8 60 Thousand/uL      RBC 5 15 Million/uL      Hemoglobin 15 9 g/dL      Hematocrit 46 8 %      MCV 91 fL      MCH 30 9 pg      MCHC 34 0 g/dL      RDW 12 6 %      MPV 10 7 fL      Platelets 333 Thousands/uL      nRBC 0 /100 WBCs      Neutrophils Relative 66 %      Immat GRANS % 1 %      Lymphocytes Relative 26 %      Monocytes Relative 6 %      Eosinophils Relative 1 %      Basophils Relative 0 %      Neutrophils Absolute 5 71 Thousands/µL      Immature Grans Absolute 0 06 Thousand/uL      Lymphocytes Absolute 2 27 Thousands/µL      Monocytes Absolute 0 47 Thousand/µL      Eosinophils Absolute 0 07 Thousand/µL      Basophils Absolute 0 02 Thousands/µL                  XR chest 1 view portable    (Results Pending)              Procedures  ECG 12 Lead Documentation Only  Date/Time: 11/3/2019 5:17 AM  Performed by: Debbie Alonso PA-C  Authorized by: Debbie Alonso PA-C     ECG reviewed by me, the ED Provider: yes    Patient location:  ED  Previous ECG:     Previous ECG:  Compared to current    Similarity:  No change    Comparison to cardiac monitor: No    Interpretation:     Interpretation: non-specific    Rate:     ECG rate:  105    ECG rate assessment: normal Rhythm:     Rhythm: sinus rhythm    Ectopy:     Ectopy: none    QRS:     QRS axis:  Normal    QRS intervals:  Normal  Conduction:     Conduction: normal    ST segments:     ST segments:  Non-specific    Elevation:  V3, V4, V5, V6, II and aVF  T waves:     T waves: normal             ED Course  ED Course as of Nov 03 0616   Deepa Mail Nov 03, 2019   0518 Pt presenting with concerning history and physical exam for ACS with strong risk factors  Will order cardiac workup, give ASA, morphine and Nitro        0608 XR showing widened mediastinum, but likely projection from AP view  He is unable to tolerate standing for PA view  He denies back pain, abdominal pain, tearing chest pain, neuro deficits  Based on history and physical exam I do not believe he is having dissection or PE  Blood work is unremarkable  Will admit for ACS rule out               HEART Risk Score      Most Recent Value   History  1 Filed at: 11/03/2019 2677   ECG  1 Filed at: 11/03/2019 9805   Age  0 Filed at: 11/03/2019 7605   Risk Factors  2 Filed at: 11/03/2019 2013   Troponin  0 Filed at: 11/03/2019 3906   Heart Score Risk Calculator   History  1 Filed at: 11/03/2019 5089   ECG  1 Filed at: 11/03/2019 7073   Age  0 Filed at: 11/03/2019 6685   Risk Factors  2 Filed at: 11/03/2019 0788   Troponin  0 Filed at: 11/03/2019 7833   HEART Score  4 Filed at: 11/03/2019 7467   HEART Score  4 Filed at: 11/03/2019 0608                            MDM    Disposition  Final diagnoses:   Chest pain   Hyperglycemia due to type 2 diabetes mellitus ([facility] [address] )     Time reflects when diagnosis was documented in both MDM as applicable and the Disposition within this note     Time User Action Codes Description Comment    11/3/2019  6:11 AM Cholo Carreon [R07 9] Chest pain     11/3/2019  6:11 AM Cholo SHIN Add [E11 65] Hyperglycemia due to type 2 diabetes mellitus [facility])       ED Disposition     ED Disposition Condition Date/Time Comment    Admit Meg Valencia Nov 3, 2019  6:14 AM Case was discussed with MIKE and the patient's admission status was agreed to be Admission Status: observation status to the service of Dr Trinh Cui   Follow-up Information    None         Patient's Medications   Discharge Prescriptions    No medications on file     No discharge procedures on file      ED Provider  Electronically Signed by           Debbie Alonso PA-C  11/03/19 9523

## 2019-11-03 NOTE — NURSING NOTE
Patient's chest pain has improved from a stated 20/10 to a 6/10 after starting Heparin gtt  Patient is now complaining of a headache  Tylenol given  Dr Adamaris Lombardo aware  RN will continue to monitor

## 2019-11-03 NOTE — NURSING NOTE
Patient has been drowsy since admission, still drowsy now  Easily arousable and is now sitting up in bed eating dinner  Patient did have an episode this evening where he urinated himself in his sleep and when he went to the bathroom, he fell asleep on the toilet  Patient states, "This is how I felt when I had my last heart attack " Patient states, "I just feel off, I'm hot and sweaty and sleepy " Translation provided by patient's sister in law Zuhair Mckeon and Dr Dillon Clark with Cardiology made aware via [facility]

## 2019-11-03 NOTE — NURSING NOTE
Dr Tammy Tovar notifed via Pete Pato of 2nd and 3rd troponin results and D-dimer results  Ordered CTA chest pe study to be done

## 2019-11-03 NOTE — CONSULTS
Cardiology Consult  11/03/19    Referring Physian: MD MIKE Glaser    Chief Complain/Reason for Referal:     IMPRESSION/RECOMMENDATIONS/DISCUSSION:  1  Severe chest pain:  Clearly reproducible with inspiration, and palpation  May be musculoskeletal in etiology  Initial troponin level within normal limits with 2nd level being drawn at this time  No ischemic ECG changes  Will trend troponin levels x3  If unremarkable, with suspect possible musculoskeletal etiology  Will check D-dimer, if elevated, CTA chest be recommended, as there seems to be some reproduction with deep inspiration a does have some dyspnea  Will schedule echocardiogram for tomorrow  Will increase dose of aspirin to 325 mg p o  T i d   Agree with atorvastatin  Continue clopidogrel, lisinopril, metoprolol     Will check sedimentation rate and CRP    2  CAD, NSTEMI in 2014 status post PCI/JESUS MANUEL x2 to the circumflex and OM1:  Continue aspirin, atorvastatin, clopidogrel, lisinopril, metoprolol  Heart healthy diet, weight loss is strongly recommended    3  Hypertension:  Continue lisinopril and metoprolol  May titrate up if needed    4  Dyslipidemia:  Continue atorvastatin, lipid panel pending    ======================================================    HPI:  I am seeing this patient in cardiology consultation for:  Chest pain    Dawson Haro is a 27 y o  male with a history of non-STEMI in 2014 status post PCI/JESUS MANUEL x2 to the mid circumflex and OM1  At that time, the LAD and RCA were unremarkable  He has been noncompliant with any outpatient follow-up  Early this morning around 3:00 a m , he had severe substernal chest pain which prompted him to come to the ER  He does state this increases with deep inspiration, and palpation  Upon into the room, the patient was sleeping comfortably, but after he was awakened, complaint of ongoing/constant severe chest pain which was reproducible with palpation and deep inspiration    He does state that both of his arms hurt as well without any radiation to the jaw  He admits to some"shortness of breath, denies palpitations, dizziness, lower extremity edema, orthopnea  Past Medical History:   Diagnosis Date    Cardiac disease          Scheduled Meds:  Current Facility-Administered Medications:  acetaminophen 650 mg Oral Q4H PRN [facility], CRNP   aluminum-magnesium hydroxide-simethicone 30 mL Oral Q6H PRN [facility], CRNP   aspirin 81 mg Oral Daily [facility], CRNP   atorvastatin 40 mg Oral Daily With Duran Lloyd, CRNP   clopidogrel 75 mg Oral Daily [facility], CRNP   influenza vaccine 0 5 mL Intramuscular Once Capri Guillaume MD   insulin lispro 2-12 Units Subcutaneous Q6H [facility], CRNP   lisinopril 10 mg Oral Daily [facility], CRNP   metoprolol succinate 50 mg Oral Daily [facility], CRNP   morphine injection 1 mg Intravenous Q4H PRN [facility], CRNP   nitroglycerin 0 4 mg Sublingual Q5 Min PRN [facility], CRNP   ondansetron 4 mg Intravenous Q6H PRN [facility], CRNP     Continuous Infusions:   PRN Meds:   acetaminophen    aluminum-magnesium hydroxide-simethicone    morphine injection    nitroglycerin    ondansetron  No Known Allergies  I reviewed the Home Medication list in the chart  History reviewed  No pertinent family history      Social History     Socioeconomic History    Marital status: Single     Spouse name: Not on file    Number of children: Not on file    Years of education: Not on file    Highest education level: Not on file   Occupational History    Not on file   Social Needs    Financial resource strain: Not on file    Food insecurity:     Worry: Not on file     Inability: Not on file    Transportation needs:     Medical: Not on file     Non-medical: Not on file   Tobacco Use    Smoking status: Never Smoker    Smokeless tobacco: Never Used   Substance and Sexual Activity  Alcohol use: Yes     Comment: socailly     Drug use: No    Sexual activity: Not on file   Lifestyle    Physical activity:     Days per week: Not on file     Minutes per session: Not on file    Stress: Not on file   Relationships    Social connections:     Talks on phone: Not on file     Gets together: Not on file     Attends Jain service: Not on file     Active member of club or organization: Not on file     Attends meetings of clubs or organizations: Not on file     Relationship status: Not on file    Intimate partner violence:     Fear of current or ex partner: Not on file     Emotionally abused: Not on file     Physically abused: Not on file     Forced sexual activity: Not on file   Other Topics Concern    Not on file   Social History Narrative    Not on file       Review of Systems - as per HPI, all others reviewed and negative  Vitals:    11/03/19 0700   BP: 159/88   Pulse: 98   Resp: (!) 24   Temp: (!) 96 5 °F (35 8 °C)   SpO2: 96%     I/O     None        Weight (last 2 days)     Date/Time   Weight    11/03/19 0508   122 (269 18)              GEN: NAD, Alert  HEENT: Mucus membranes moist, pink conjunctivae  EYES: Pupils equal, sclera anicteric  NECK: No JVD/HJR, no carotid bruit  CARDIOVASCULAR: RRR, No murmur, rub, gallops S1,S2, no parasternal heave/thrill  LUNGS: Clear To auscultation bilaterally  ABDOMEN: Soft, nondistended, no hepatic systolic pulsation  EXTREMITIES/VASCULAR: No edema    PSYCH: Normal Affect by limited examination  NEURO: Grossly intact by limited examination    HEME: No significant bleeding, bruising, petechia by limited examination  SKIN: No significant rashes by limited examination  MSK:  Normal upper extremity and trunk strengths by limited examination      EKG:  Sinus rhythm, possible prior high lateral infarction with nonspecific ST segment change  TELE:  Sinus rhythm, artifact  CXR:  For penetration    PRIOR CATH:  Prior catheterization report reviewed from 2014      Results from last 7 days   Lab Units 11/03/19 0513   WBC Thousand/uL 8 60   HEMOGLOBIN g/dL 15 9   HEMATOCRIT % 46 8   PLATELETS Thousands/uL 311   NEUTROS PCT % 66   MONOS PCT % 6     Results from last 7 days   Lab Units 11/03/19 0513   POTASSIUM mmol/L 3 6   CHLORIDE mmol/L 97*   CO2 mmol/L 23   BUN mg/dL 10   CREATININE mg/dL 0 88   CALCIUM mg/dL 9 3     Results from last 7 days   Lab Units 11/03/19 0513   POTASSIUM mmol/L 3 6   CHLORIDE mmol/L 97*   CO2 mmol/L 23   BUN mg/dL 10   CREATININE mg/dL 0 88   CALCIUM mg/dL 9 3   ALK PHOS U/L 113   ALT U/L 32   AST U/L 28     No results found for: TROPONINT      Results from last 7 days   Lab Units 11/03/19 0513   TROPONIN I ng/mL <0 02                       I have personally reviewed the EKG, CXR and Telemetry images directly  Patient Active Problem List    Diagnosis Date Noted    Noncompliance 11/03/2019     Priority: Low    Chest pain 11/26/2018     Priority: Low    Hypercholesterolemia 11/26/2018     Priority: Low    Essential hypertension 11/26/2018     Priority: Low    Type 2 diabetes mellitus with hyperglycemia, without long-term current use of insulin ([facility] [address] ) 11/26/2018     Priority: Low    Morbid obesity with body mass index (BMI) of 40 0 to 44 9 in adult [facility]) 11/26/2018     Priority: Low       Portions of the record may have been created with voice recognition software  Occasional wrong word or "sound a like" substitutions may have occurred due to the inherent limitations of voice recognition software  Read the chart carefully and recognize, using context, where substitutions have occurred

## 2019-11-03 NOTE — ED NOTES
Pt reporting difficulty breathing  sats dropped to 85%, recovered to 96% on his own  Provider made aware  Order obtained        Renan Dhaliwal RN  11/03/19 0608

## 2019-11-03 NOTE — H&P
H&P- Jaleel Leblanc 1985, 27 y o  male MRN: [id number]    Unit/Bed#: E4 -01 Encounter: [id number]    Primary Care Provider: No primary care provider on file  Date and time admitted to hospital: 11/3/2019  4:59 AM      * Chest pain  Assessment & Plan  Reports chest pain radiating to both arms associated with shortness of breath  Troponin neg, trend x3  EKG: ST abnormalities; serial EKGs  NICK SCORE 3  S/p ASA bolus, morphine and NTG; no improvement in chest pain  Check lipid panel and A1c  Monitor on telemetry  BNP ordered  Cardiology consult     · H/o MI 2014, S/p cardiac cath, 100% stenosis OM, 95% stenosis mid circumflex, s/p JESUS MANUEL x2, on DAPT/ASA and plavix    Type 2 diabetes mellitus with hyperglycemia, without long-term current use of insulin (HCC)  Assessment & Plan    Lab Results   Component Value Date    HGBA1C 8 3 (H) 11/26/2018     · Uncontrolled DM,   · A1c ordered   · Hold Janumet  · Start Accu-Cheks and sliding scale  · ADA diet    Noncompliance  Assessment & Plan  Noncompliant with medications and outpatient follow-up; patient states he lost his health insurance  Case management consult for assistance with medications    Morbid obesity with body mass index (BMI) of 40 0 to 44 9 in adult [facility])  Assessment & Plan  Encourage diet, exercise and weight loss    Essential hypertension  Assessment & Plan  Restart Toprol and enalapril    Hypercholesterolemia  Assessment & Plan  Lipid panel ordered, will start atorvastatin 40 mg daily    VTE Prophylaxis: Low risk  / reason for no mechanical VTE prophylaxis Ambulating   Code Status:  Full code  POLST: POLST is not applicable to this patient  Discussion with family:  Spouse at bedside    Anticipated Length of Stay:  Patient will be admitted on an Observation basis with an anticipated length of stay of  < 2 midnights  Justification for Hospital Stay: CP    Total Time for Visit, including Counseling / Coordination of Care: 45 minutes  Greater than 50% of this total time spent on direct patient counseling and coordination of care  Chief Complaint:   Chest pain    History of Present Illness:    Gonzalez Estevez is a 27 y o  male with PMH MI, HLD, HTN, DM2, morbid obesity who presents with c/o chest pain  Patient has h/o MI in 2014, s/p PCI JESUS MANUEL x2 on DAPT, noncompliant with medication and outpatient follow-up  Reports he was drinking tonight, went home, laid down and developed chest pain  Chest pain associated with shortness of breath, denies associated nausea and diaphoresis, chest pain radiated to both arms, no aggravating or relieving factors identified, patient is writhing in pain, chest pain reproducible, denies trauma to chest   Noncompliant with medications, patient denied this although wife states he has not taken his medications in a long time; pt states it is due to loss of insurance  Review of Systems:    Review of Systems   Constitutional: Positive for diaphoresis  Respiratory: Positive for shortness of breath  Cardiovascular: Positive for chest pain  Gastrointestinal: Negative  Genitourinary: Negative  Musculoskeletal: Negative  Neurological: Negative  Past Medical and Surgical History:     Past Medical History:   Diagnosis Date    Cardiac disease        Past Surgical History:   Procedure Laterality Date    CARDIAC CATHETERIZATION      CORONARY ANGIOPLASTY WITH STENT PLACEMENT         Meds/Allergies:    Prior to Admission medications    Medication Sig Start Date End Date Taking? Authorizing Provider   acetaminophen (TYLENOL) 325 mg tablet 650 mg q 6 hours p r n   Pain/fever  Patient not taking: Reported on 11/3/2019 11/26/18   Brett Reyes MD   aspirin (ECOTRIN LOW STRENGTH) 81 mg EC tablet Take 1 tablet (81 mg total) by mouth daily  Patient not taking: Reported on 11/3/2019 11/26/18   Brett Reyes MD   clopidogrel (PLAVIX) 75 mg tablet Take 1 tablet (75 mg total) by mouth daily  Patient not taking: Reported on 11/3/2019 11/26/18   Cristine Morse MD   enalapril (VASOTEC) 2 5 mg tablet Take 1 tablet (2 5 mg total) by mouth daily  Patient not taking: Reported on 11/3/2019 11/26/18   Cristine Morse MD   ezetimibe (ZETIA) 10 mg tablet Take 10 mg by mouth daily    Historical Provider, MD   metoprolol succinate (TOPROL-XL) 50 mg 24 hr tablet Take 1 tablet (50 mg total) by mouth daily  Patient not taking: Reported on 11/3/2019 11/26/18   Cristine Morse MD   naproxen (NAPROSYN) 500 mg tablet Take 1 tablet (500 mg total) by mouth every 12 (twelve) hours as needed for mild pain or moderate pain  Patient not taking: Reported on 11/3/2019 11/26/18   Cristine Morse MD   rosuvastatin (CRESTOR) 40 MG tablet Take 1 tablet (40 mg total) by mouth daily at bedtime  Patient not taking: Reported on 11/3/2019 11/26/18   Cristine Morse MD   sitaGLIPtin-metFORMIN (JANUMET)  MG per tablet Take 1 tablet by mouth 2 (two) times a day with meals  Patient not taking: Reported on 11/3/2019 11/26/18   Cristine Morse MD     I have reviewed home medications using allscripts  Allergies: No Known Allergies    Social History:     Marital Status: Single   Occupation: caregiver, 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  Patient Pre-hospital Living Situation:  Resides at home with spouse and child  Patient Pre-hospital Level of Mobility:  Ambulatory  Patient Pre-hospital Diet Restrictions:   Substance Use History:   Social History     Substance and Sexual Activity   Alcohol Use Yes    Comment: socailly      Social History     Tobacco Use   Smoking Status Never Smoker   Smokeless Tobacco Never Used     Social History     Substance and Sexual Activity   Drug Use No       Family History:    History reviewed  No pertinent family history      Physical Exam:     Vitals:   Blood Pressure: 126/70 (11/03/19 0545)  Pulse: 98 (11/03/19 0545)  Temperature: 98 2 °F (36 8 °C) (11/03/19 0508)  Temp Source: Oral (11/03/19 0508)  Respirations: (!) 29 (11/03/19 0538)  Weight - Scale: 122 kg (269 lb 2 9 oz) (11/03/19 6132)  SpO2: 93 % (11/03/19 0558)    Physical Exam   Constitutional: He is oriented to person, place, and time  He appears well-developed  No distress  Morbidly obese   HENT:   Head: Normocephalic and atraumatic  Neck: Neck supple  Cardiovascular: Normal rate, regular rhythm, normal heart sounds and intact distal pulses  No murmur heard  Chest tenderness on palpation   Pulmonary/Chest: Effort normal and breath sounds normal  No stridor  No respiratory distress  He has no wheezes  He has no rales  Abdominal: Soft  Bowel sounds are normal  He exhibits no distension and no mass  There is no tenderness  There is no guarding  Musculoskeletal: He exhibits no edema  Neurological: He is alert and oriented to person, place, and time  Skin: Skin is warm and dry  He is not diaphoretic  No pallor  Psychiatric: He has a normal mood and affect  His behavior is normal  Judgment and thought content normal      Additional Data:     Lab Results: I have personally reviewed pertinent reports  Results from last 7 days   Lab Units 11/03/19  0513   WBC Thousand/uL 8 60   HEMOGLOBIN g/dL 15 9   HEMATOCRIT % 46 8   PLATELETS Thousands/uL 311   NEUTROS PCT % 66   LYMPHS PCT % 26   MONOS PCT % 6   EOS PCT % 1     Results from last 7 days   Lab Units 11/03/19  0513   SODIUM mmol/L 134*   POTASSIUM mmol/L 3 6   CHLORIDE mmol/L 97*   CO2 mmol/L 23   BUN mg/dL 10   CREATININE mg/dL 0 88   ANION GAP mmol/L 14*   CALCIUM mg/dL 9 3   ALBUMIN g/dL 3 7   TOTAL BILIRUBIN mg/dL 0 41   ALK PHOS U/L 113   ALT U/L 32   AST U/L 28   GLUCOSE RANDOM mg/dL 336*                       Imaging: I have personally reviewed pertinent reports        XR chest 1 view portable    (Results Pending)       EKG, Pathology, and Other Studies Reviewed on Admission:   · EKG: CXR cardiac catheterization    Allscripts / Epic Records Reviewed: Yes     ** Please Note: This note has been constructed using a voice recognition system   **

## 2019-11-03 NOTE — ASSESSMENT & PLAN NOTE
Noncompliant with medications and outpatient follow-up; patient states he lost his health insurance  Case management consult for assistance with medications

## 2019-11-04 ENCOUNTER — APPOINTMENT (INPATIENT)
Dept: NON INVASIVE DIAGNOSTICS | Facility: HOSPITAL | Age: 34
DRG: 287 | End: 2019-11-04
Payer: COMMERCIAL

## 2019-11-04 PROBLEM — I21.4 NSTEMI (NON-ST ELEVATED MYOCARDIAL INFARCTION) (HCC): Status: ACTIVE | Noted: 2018-11-26

## 2019-11-04 LAB
ANION GAP SERPL CALCULATED.3IONS-SCNC: 17 MMOL/L (ref 4–13)
APTT PPP: 41 SECONDS (ref 23–37)
APTT PPP: 56 SECONDS (ref 23–37)
BASOPHILS # BLD AUTO: 0.02 THOUSANDS/ΜL (ref 0–0.1)
BASOPHILS NFR BLD AUTO: 0 % (ref 0–1)
BUN SERPL-MCNC: 12 MG/DL (ref 5–25)
CALCIUM SERPL-MCNC: 9 MG/DL (ref 8.3–10.1)
CHLORIDE SERPL-SCNC: 91 MMOL/L (ref 100–108)
CHOLEST SERPL-MCNC: 331 MG/DL (ref 50–200)
CO2 SERPL-SCNC: 25 MMOL/L (ref 21–32)
CREAT SERPL-MCNC: 0.81 MG/DL (ref 0.6–1.3)
EOSINOPHIL # BLD AUTO: 0.21 THOUSAND/ΜL (ref 0–0.61)
EOSINOPHIL NFR BLD AUTO: 3 % (ref 0–6)
ERYTHROCYTE [DISTWIDTH] IN BLOOD BY AUTOMATED COUNT: 12.8 % (ref 11.6–15.1)
EST. AVERAGE GLUCOSE BLD GHB EST-MCNC: 220 MG/DL
GFR SERPL CREATININE-BSD FRML MDRD: 115 ML/MIN/1.73SQ M
GLUCOSE SERPL-MCNC: 137 MG/DL (ref 65–140)
GLUCOSE SERPL-MCNC: 183 MG/DL (ref 65–140)
GLUCOSE SERPL-MCNC: 190 MG/DL (ref 65–140)
GLUCOSE SERPL-MCNC: 204 MG/DL (ref 65–140)
GLUCOSE SERPL-MCNC: 256 MG/DL (ref 65–140)
GLUCOSE SERPL-MCNC: 257 MG/DL (ref 65–140)
GLUCOSE SERPL-MCNC: 260 MG/DL (ref 65–140)
HBA1C MFR BLD: 9.3 % (ref 4.2–6.3)
HCT VFR BLD AUTO: 44.2 % (ref 36.5–49.3)
HDLC SERPL-MCNC: 31 MG/DL
HGB BLD-MCNC: 14.7 G/DL (ref 12–17)
IMM GRANULOCYTES # BLD AUTO: 0.05 THOUSAND/UL (ref 0–0.2)
IMM GRANULOCYTES NFR BLD AUTO: 1 % (ref 0–2)
LDLC SERPL DIRECT ASSAY-MCNC: 197 MG/DL (ref 0–100)
LYMPHOCYTES # BLD AUTO: 1.71 THOUSANDS/ΜL (ref 0.6–4.47)
LYMPHOCYTES NFR BLD AUTO: 21 % (ref 14–44)
MCH RBC QN AUTO: 30.6 PG (ref 26.8–34.3)
MCHC RBC AUTO-ENTMCNC: 33.3 G/DL (ref 31.4–37.4)
MCV RBC AUTO: 92 FL (ref 82–98)
MONOCYTES # BLD AUTO: 0.58 THOUSAND/ΜL (ref 0.17–1.22)
MONOCYTES NFR BLD AUTO: 7 % (ref 4–12)
NEUTROPHILS # BLD AUTO: 5.79 THOUSANDS/ΜL (ref 1.85–7.62)
NEUTS SEG NFR BLD AUTO: 68 % (ref 43–75)
NRBC BLD AUTO-RTO: 0 /100 WBCS
PLATELET # BLD AUTO: 258 THOUSANDS/UL (ref 149–390)
PMV BLD AUTO: 10.7 FL (ref 8.9–12.7)
POTASSIUM SERPL-SCNC: 3.6 MMOL/L (ref 3.5–5.3)
RBC # BLD AUTO: 4.8 MILLION/UL (ref 3.88–5.62)
SODIUM SERPL-SCNC: 133 MMOL/L (ref 136–145)
TRIGL SERPL-MCNC: 526 MG/DL
TROPONIN I SERPL-MCNC: 8.29 NG/ML
WBC # BLD AUTO: 8.36 THOUSAND/UL (ref 4.31–10.16)

## 2019-11-04 PROCEDURE — 80048 BASIC METABOLIC PNL TOTAL CA: CPT | Performed by: INTERNAL MEDICINE

## 2019-11-04 PROCEDURE — C1769 GUIDE WIRE: HCPCS | Performed by: PHYSICIAN ASSISTANT

## 2019-11-04 PROCEDURE — 80061 LIPID PANEL: CPT | Performed by: INTERNAL MEDICINE

## 2019-11-04 PROCEDURE — 82948 REAGENT STRIP/BLOOD GLUCOSE: CPT

## 2019-11-04 PROCEDURE — 99232 SBSQ HOSP IP/OBS MODERATE 35: CPT | Performed by: INTERNAL MEDICINE

## 2019-11-04 PROCEDURE — 85730 THROMBOPLASTIN TIME PARTIAL: CPT | Performed by: INTERNAL MEDICINE

## 2019-11-04 PROCEDURE — 93454 CORONARY ARTERY ANGIO S&I: CPT | Performed by: PHYSICIAN ASSISTANT

## 2019-11-04 PROCEDURE — C1887 CATHETER, GUIDING: HCPCS | Performed by: PHYSICIAN ASSISTANT

## 2019-11-04 PROCEDURE — 99233 SBSQ HOSP IP/OBS HIGH 50: CPT | Performed by: PHYSICIAN ASSISTANT

## 2019-11-04 PROCEDURE — 99152 MOD SED SAME PHYS/QHP 5/>YRS: CPT | Performed by: PHYSICIAN ASSISTANT

## 2019-11-04 PROCEDURE — 83721 ASSAY OF BLOOD LIPOPROTEIN: CPT | Performed by: INTERNAL MEDICINE

## 2019-11-04 PROCEDURE — 84484 ASSAY OF TROPONIN QUANT: CPT | Performed by: INTERNAL MEDICINE

## 2019-11-04 PROCEDURE — 83036 HEMOGLOBIN GLYCOSYLATED A1C: CPT | Performed by: INTERNAL MEDICINE

## 2019-11-04 PROCEDURE — 99153 MOD SED SAME PHYS/QHP EA: CPT | Performed by: PHYSICIAN ASSISTANT

## 2019-11-04 PROCEDURE — B2111ZZ FLUOROSCOPY OF MULTIPLE CORONARY ARTERIES USING LOW OSMOLAR CONTRAST: ICD-10-PCS | Performed by: INTERNAL MEDICINE

## 2019-11-04 PROCEDURE — C1894 INTRO/SHEATH, NON-LASER: HCPCS | Performed by: PHYSICIAN ASSISTANT

## 2019-11-04 PROCEDURE — 99152 MOD SED SAME PHYS/QHP 5/>YRS: CPT | Performed by: INTERNAL MEDICINE

## 2019-11-04 PROCEDURE — 93454 CORONARY ARTERY ANGIO S&I: CPT | Performed by: INTERNAL MEDICINE

## 2019-11-04 PROCEDURE — 85025 COMPLETE CBC W/AUTO DIFF WBC: CPT | Performed by: INTERNAL MEDICINE

## 2019-11-04 RX ORDER — INSULIN GLARGINE 100 [IU]/ML
10 INJECTION, SOLUTION SUBCUTANEOUS
Status: DISCONTINUED | OUTPATIENT
Start: 2019-11-04 | End: 2019-11-05 | Stop reason: HOSPADM

## 2019-11-04 RX ORDER — COLCHICINE 0.6 MG/1
0.6 TABLET ORAL 2 TIMES DAILY
Status: DISCONTINUED | OUTPATIENT
Start: 2019-11-04 | End: 2019-11-05 | Stop reason: HOSPADM

## 2019-11-04 RX ORDER — SODIUM CHLORIDE 9 MG/ML
100 INJECTION, SOLUTION INTRAVENOUS ONCE
Status: COMPLETED | OUTPATIENT
Start: 2019-11-04 | End: 2019-11-04

## 2019-11-04 RX ORDER — FENTANYL CITRATE 50 UG/ML
INJECTION, SOLUTION INTRAMUSCULAR; INTRAVENOUS CODE/TRAUMA/SEDATION MEDICATION
Status: COMPLETED | OUTPATIENT
Start: 2019-11-04 | End: 2019-11-04

## 2019-11-04 RX ORDER — INSULIN GLARGINE 100 [IU]/ML
8 INJECTION, SOLUTION SUBCUTANEOUS
Status: DISCONTINUED | OUTPATIENT
Start: 2019-11-04 | End: 2019-11-04

## 2019-11-04 RX ORDER — NITROGLYCERIN 20 MG/100ML
INJECTION INTRAVENOUS CODE/TRAUMA/SEDATION MEDICATION
Status: COMPLETED | OUTPATIENT
Start: 2019-11-04 | End: 2019-11-04

## 2019-11-04 RX ORDER — LIDOCAINE HYDROCHLORIDE 10 MG/ML
INJECTION, SOLUTION EPIDURAL; INFILTRATION; INTRACAUDAL; PERINEURAL CODE/TRAUMA/SEDATION MEDICATION
Status: COMPLETED | OUTPATIENT
Start: 2019-11-04 | End: 2019-11-04

## 2019-11-04 RX ORDER — PANTOPRAZOLE SODIUM 40 MG/1
40 TABLET, DELAYED RELEASE ORAL
Status: DISCONTINUED | OUTPATIENT
Start: 2019-11-04 | End: 2019-11-05 | Stop reason: HOSPADM

## 2019-11-04 RX ORDER — MIDAZOLAM HYDROCHLORIDE 2 MG/2ML
INJECTION, SOLUTION INTRAMUSCULAR; INTRAVENOUS CODE/TRAUMA/SEDATION MEDICATION
Status: COMPLETED | OUTPATIENT
Start: 2019-11-04 | End: 2019-11-04

## 2019-11-04 RX ORDER — IBUPROFEN 600 MG/1
600 TABLET ORAL 3 TIMES DAILY
Status: DISCONTINUED | OUTPATIENT
Start: 2019-11-04 | End: 2019-11-05 | Stop reason: HOSPADM

## 2019-11-04 RX ORDER — VERAPAMIL HYDROCHLORIDE 2.5 MG/ML
INJECTION, SOLUTION INTRAVENOUS CODE/TRAUMA/SEDATION MEDICATION
Status: COMPLETED | OUTPATIENT
Start: 2019-11-04 | End: 2019-11-04

## 2019-11-04 RX ORDER — HEPARIN SODIUM 1000 [USP'U]/ML
INJECTION, SOLUTION INTRAVENOUS; SUBCUTANEOUS CODE/TRAUMA/SEDATION MEDICATION
Status: COMPLETED | OUTPATIENT
Start: 2019-11-04 | End: 2019-11-04

## 2019-11-04 RX ORDER — SODIUM CHLORIDE 9 MG/ML
100 INJECTION, SOLUTION INTRAVENOUS CONTINUOUS
Status: DISCONTINUED | OUTPATIENT
Start: 2019-11-04 | End: 2019-11-05 | Stop reason: HOSPADM

## 2019-11-04 RX ADMIN — CLOPIDOGREL BISULFATE 75 MG: 75 TABLET ORAL at 09:01

## 2019-11-04 RX ADMIN — SODIUM CHLORIDE 100 ML/HR: 0.9 INJECTION, SOLUTION INTRAVENOUS at 09:22

## 2019-11-04 RX ADMIN — SODIUM CHLORIDE 100 ML/HR: 0.9 INJECTION, SOLUTION INTRAVENOUS at 16:00

## 2019-11-04 RX ADMIN — ASPIRIN 325 MG ORAL TABLET 325 MG: 325 PILL ORAL at 09:02

## 2019-11-04 RX ADMIN — LIDOCAINE HYDROCHLORIDE 1 ML: 10 INJECTION, SOLUTION EPIDURAL; INFILTRATION; INTRACAUDAL; PERINEURAL at 14:05

## 2019-11-04 RX ADMIN — METOPROLOL SUCCINATE 50 MG: 50 TABLET, EXTENDED RELEASE ORAL at 09:01

## 2019-11-04 RX ADMIN — INSULIN LISPRO 3 UNITS: 100 INJECTION, SOLUTION INTRAVENOUS; SUBCUTANEOUS at 09:04

## 2019-11-04 RX ADMIN — FENTANYL CITRATE 50 MCG: 50 INJECTION, SOLUTION INTRAMUSCULAR; INTRAVENOUS at 13:58

## 2019-11-04 RX ADMIN — HEPARIN SODIUM 2000 UNITS: 1000 INJECTION, SOLUTION INTRAVENOUS; SUBCUTANEOUS at 11:05

## 2019-11-04 RX ADMIN — HEPARIN SODIUM AND DEXTROSE 19.1 UNITS/KG/HR: 10000; 5 INJECTION INTRAVENOUS at 08:23

## 2019-11-04 RX ADMIN — IBUPROFEN 600 MG: 600 TABLET ORAL at 16:32

## 2019-11-04 RX ADMIN — VERAPAMIL HYDROCHLORIDE 2.5 MG: 2.5 INJECTION INTRAVENOUS at 14:06

## 2019-11-04 RX ADMIN — IBUPROFEN 600 MG: 600 TABLET ORAL at 21:18

## 2019-11-04 RX ADMIN — MIDAZOLAM 2 MG: 1 INJECTION INTRAMUSCULAR; INTRAVENOUS at 13:58

## 2019-11-04 RX ADMIN — ASPIRIN 325 MG ORAL TABLET 325 MG: 325 PILL ORAL at 16:31

## 2019-11-04 RX ADMIN — IOHEXOL 120 ML: 350 INJECTION, SOLUTION INTRAVENOUS at 14:23

## 2019-11-04 RX ADMIN — ATORVASTATIN CALCIUM 40 MG: 40 TABLET, FILM COATED ORAL at 16:31

## 2019-11-04 RX ADMIN — HEPARIN SODIUM 4000 UNITS: 1000 INJECTION, SOLUTION INTRAVENOUS; SUBCUTANEOUS at 04:15

## 2019-11-04 RX ADMIN — ASPIRIN 325 MG ORAL TABLET 325 MG: 325 PILL ORAL at 21:18

## 2019-11-04 RX ADMIN — INSULIN GLARGINE 10 UNITS: 100 INJECTION, SOLUTION SUBCUTANEOUS at 21:18

## 2019-11-04 RX ADMIN — COLCHICINE 0.6 MG: 0.6 TABLET, FILM COATED ORAL at 17:36

## 2019-11-04 RX ADMIN — PANTOPRAZOLE SODIUM 40 MG: 40 TABLET, DELAYED RELEASE ORAL at 16:31

## 2019-11-04 RX ADMIN — INSULIN LISPRO 4 UNITS: 100 INJECTION, SOLUTION INTRAVENOUS; SUBCUTANEOUS at 12:57

## 2019-11-04 RX ADMIN — HEPARIN SODIUM 4000 UNITS: 1000 INJECTION INTRAVENOUS; SUBCUTANEOUS at 14:06

## 2019-11-04 RX ADMIN — NITROGLYCERIN 200 MCG: 20 INJECTION INTRAVENOUS at 14:07

## 2019-11-04 RX ADMIN — LISINOPRIL 10 MG: 10 TABLET ORAL at 09:01

## 2019-11-04 NOTE — ASSESSMENT & PLAN NOTE
· Reports chest pain radiating to both arms associated with shortness of breath  · H/o MI 2014, S/p cardiac cath, 100% stenosis OM, 95% stenosis mid circumflex, s/p JESUS MANUEL x2, on DAPT/ASA and plavix  · Serial troponin continued to trend up to peak 8 29   · CTA chest negative for PE   · Appreciate Cardiology consult   · Continue heparin drip   · Aspirin, plavix,statin   · Nitroglycerin and IV morphine prn   · , HDL 31, triglycerides 526, total cholesterol 331   · Monitor on telemetry  · NPO for cardiac cath today

## 2019-11-04 NOTE — PROGRESS NOTES
Progress Note - Cardiology   [facility] A Deana 27 y o  male MRN: [id number]  Unit/Bed#: E4 -01 Encounter: [id number]            Asessment/Plan:  1  Non STEMI:  concern for probable type 1  Escalating troponin to a current value of 8 29  2  CAD, non-STEMI 2014 w/ JESUS MANUEL - CFx & OM1  3  Uncontrolled dyslipidemia:  Current lipids =TChol 331, Trig 526,  HDL 31,    4  Hypertension  5  Medical noncompliance:  Patient is on no pre-hospital medications and has not been to a physician in approximately 1 year  6  Diabetes mellitus:  On no pre-hospital Rx with patient reporting pre diabetes managed with diet  7  Obesity:  BMI greater than 40    · Echocardiogram forthcoming  · Catheterization advised an acceptable to the patient -- will proceed today  · Yesterday patient was initiated on t i d  Aspirin with concern for possible pericarditis  Will await results of catheterization addressing need for change thereafter  · Patient received Plavix load yesterday and is receiving daily dosing today  · Continue IV heparin while awaiting results of catheterization  · Continue beta-blocker and ACE-inhibitor  · For now we will increase atorvastatin to 80 mg (on no pre-hospital Rx)  Subjective:   Comfortable at the time my visit with no new complaints    D-dimer 0 50, ESR 17,CRP 16 3    Vitals:  Vitals:    11/03/19 0508   Weight: 122 kg (269 lb 2 9 oz)   ,  Vitals:    11/03/19 1626 11/03/19 1919 11/03/19 2015 11/03/19 2300   BP: 122/72 120/80 124/74 128/80   BP Location: Right arm Right arm Left arm Left arm   Pulse: 91 85  87   Resp: 20 18 19   Temp: 97 7 °F (36 5 °C) 98 6 °F (37 °C)  98 °F (36 7 °C)   TempSrc: Tympanic Tympanic  Temporal   SpO2: 95% 92%  96%   Weight:           Exam:  General:  Alert normally conversant and comfortable-appearing  Heart:  Regular with controlled rate and no pathologic murmur or rub  Lungs:  Clear throughout  Lower Limbs:  No edema    Medications:    Current Facility-Administered Medications:     acetaminophen (TYLENOL) tablet 650 mg, 650 mg, Oral, Q4H PRN, LAUREEN Delgadillo, 650 mg at 11/03/19 1613    aluminum-magnesium hydroxide-simethicone (MYLANTA) 200-200-20 mg/5 mL oral suspension 30 mL, 30 mL, Oral, Q6H PRN, LAUREEN Delgadillo    aspirin tablet 325 mg, 325 mg, Oral, TID, Deepak Snyder MD, 325 mg at 11/03/19 2130    atorvastatin (LIPITOR) tablet 40 mg, 40 mg, Oral, Daily With Dinner, LAUREEN Delgadillo, 40 mg at 11/03/19 1613    clopidogrel (PLAVIX) tablet 75 mg, 75 mg, Oral, Daily, LAUREEN Delgadillo, 75 mg at 11/03/19 1017    heparin (porcine) 25,000 units in 250 mL infusion (premix), 3-20 Units/kg/hr (Order-Specific), Intravenous, Titrated, Deepak Snyder MD, Last Rate: 17 2 mL/hr at 11/04/19 0823, 19 1 Units/kg/hr at 11/04/19 0823    heparin (porcine) injection 2,000 Units, 2,000 Units, Intravenous, PRN, Deepak Snyder MD    heparin (porcine) injection 4,000 Units, 4,000 Units, Intravenous, PRN, Deepak Snyder MD, 4,000 Units at 11/04/19 0415    insulin glargine (LANTUS) subcutaneous injection 10 Units 0 1 mL, 10 Units, Subcutaneous, HS, Viola Lopez PA-C    insulin lispro (HumaLOG) 100 units/mL subcutaneous injection 2-12 Units, 2-12 Units, Subcutaneous, TID AC, 4 Units at 11/03/19 1619 **AND** Fingerstick Glucose (POCT), , , TID AC, Deepak Snyder MD    lisinopril (ZESTRIL) tablet 10 mg, 10 mg, Oral, Daily, LAUREEN Delgadillo, 10 mg at 11/03/19 1017    metoprolol succinate (TOPROL-XL) 24 hr tablet 50 mg, 50 mg, Oral, Daily, LAUREEN Delgadillo, 50 mg at 11/03/19 1017    morphine injection 1 mg, 1 mg, Intravenous, Q4H PRN, LAUREEN Delgadillo    nitroglycerin (NITROSTAT) SL tablet 0 4 mg, 0 4 mg, Sublingual, Q5 Min PRN, LAUREEN Delgadillo, 0 4 mg at 11/03/19 0800    ondansetron (ZOFRAN) injection 4 mg, 4 mg, Intravenous, Q6H PRN, LAUREEN Delgadillo    sodium chloride 0 9 % infusion, 100 mL/hr, Intravenous, Once, Jesus Aleman PA-C      Labs/Data:        Results from last 7 days   Lab Units 11/04/19  0328 11/03/19  1258 11/03/19  0513   WBC Thousand/uL 8 36 11 04* 8 60   HEMOGLOBIN g/dL 14 7 14 3 15 9   HEMATOCRIT % 44 2 42 5 46 8   PLATELETS Thousands/uL 258 271 311     Results from last 7 days   Lab Units 11/04/19  0328 11/03/19  1850 11/03/19  1457  11/03/19  0513   POTASSIUM mmol/L 3 6  --   --   --  3 6   CHLORIDE mmol/L 91*  --   --   --  97*   CO2 mmol/L 25  --   --   --  23   BUN mg/dL 12  --   --   --  10   TROPONIN I ng/mL 8 29* 6 72* 2 38*   < > <0 02    < > = values in this interval not displayed

## 2019-11-04 NOTE — ASSESSMENT & PLAN NOTE
Lab Results   Component Value Date    HGBA1C 8 3 (H) 11/26/2018     · Uncontrolled DM2  · A1c ordered and pending   · Hold Janumet  · Accu-Cheks and sliding scale  · Will add lantus 10 units qHS   · Continue to monitor and adjust regimen as needed   · ADA diet

## 2019-11-04 NOTE — PROGRESS NOTES
Progress Note - Jaleel Leblanc 1985, 27 y o  male MRN: [id number]  Unit/Bed#: E4 -01 Encounter: [id number]  Primary Care Provider: No primary care provider on file     Date and time admitted to hospital: 11/3/2019  4:59 AM    * NSTEMI (non-ST elevated myocardial infarction) [facility])  Assessment & Plan  · Reports chest pain radiating to both arms associated with shortness of breath  · H/o MI 2014, S/p cardiac cath, 100% stenosis OM, 95% stenosis mid circumflex, s/p JESUS MANUEL x2, on DAPT/ASA and plavix  · Serial troponin continued to trend up to peak 8 29   · CTA chest negative for PE   · Appreciate Cardiology consult   · Continue heparin drip   · Aspirin, plavix,statin   · Nitroglycerin and IV morphine prn   · , HDL 31, triglycerides 526, total cholesterol 331   · Monitor on telemetry  · NPO for cardiac cath today       Essential hypertension  Assessment & Plan  · Restarted Toprol and lisinopril substituted for enalapril     Hypercholesterolemia  Assessment & Plan  · , HDl 31, triglyceride 526, total cholesterol 331   · Started on atorvastatin 40mg once daily       Morbid obesity with body mass index (BMI) of 40 0 to 44 9 in adult [facility])  Assessment & Plan  Encourage diet, exercise and weight loss    Type 2 diabetes mellitus with hyperglycemia, without long-term current use of insulin (HCC)  Assessment & Plan    Lab Results   Component Value Date    HGBA1C 8 3 (H) 11/26/2018     · Uncontrolled DM2  · A1c ordered and pending   · Hold Janumet  · Accu-Cheks and sliding scale  · Will add lantus 10 units qHS   · Continue to monitor and adjust regimen as needed   · ADA diet    Noncompliance  Assessment & Plan  Noncompliant with medications and outpatient follow-up; patient states he lost his health insurance  Case management consult for assistance with medications        VTE Pharmacologic Prophylaxis:   Pharmacologic: Heparin Drip  Mechanical VTE Prophylaxis in Place: No    Patient Centered Rounds: I have performed bedside rounds with nursing staff today  Discussions with Specialists or Other Care Team Provider: will touch base with cardiology and CM     Education and Discussions with Family / Patient: patient     Time Spent for Care: 20 minutes  More than 50% of total time spent on counseling and coordination of care as described above  Current Length of Stay: 0 day(s)    Current Patient Status: Observation   Certification Statement: The patient will continue to require additional inpatient hospital stay due to NSTEMI    Discharge Plan: pending cardiac cath and plans for medications     Code Status: Level 1 - Full Code      Subjective:   Patient states chest pressure has improved significantly  States 3/10 pressure, nonradiating, left side of chest  No N/V, diaphoresis or abdominal pain  The chest pressure continues to reproducible on palpation of left side of chest wall  No SOB or difficulty breathing  No headache  Use of blue phone interpretation services was used to communicate with patient  Objective:     Vitals:   Temp (24hrs), Av 9 °F (36 6 °C), Min:97 3 °F (36 3 °C), Max:98 6 °F (37 °C)    Temp:  [97 3 °F (36 3 °C)-98 6 °F (37 °C)] 98 °F (36 7 °C)  HR:  [85-94] 87  Resp:  [18-22] 19  BP: (120-142)/(72-80) 128/80  SpO2:  [92 %-96 %] 96 %  Body mass index is 44 79 kg/m²  Input and Output Summary (last 24 hours): Intake/Output Summary (Last 24 hours) at 2019 0739  Last data filed at 11/3/2019 1400  Gross per 24 hour   Intake 0 ml   Output    Net 0 ml       Physical Exam:     Physical Exam   Constitutional: No distress  Armenian speaking primarily    HENT:   Head: Normocephalic and atraumatic  Eyes: Conjunctivae are normal    Neck: Neck supple  Cardiovascular: Normal rate and regular rhythm  Pulmonary/Chest: Effort normal and breath sounds normal  He exhibits tenderness (left sided chest tenderness on palpation)  Abdominal: Soft   Bowel sounds are normal  There is no tenderness  Musculoskeletal: He exhibits no edema  Neurological: He is alert  Skin: Skin is warm  He is not diaphoretic  Psychiatric: He has a normal mood and affect  Nursing note and vitals reviewed  Additional Data:     Labs:    Results from last 7 days   Lab Units 11/04/19  0328   WBC Thousand/uL 8 36   HEMOGLOBIN g/dL 14 7   HEMATOCRIT % 44 2   PLATELETS Thousands/uL 258   NEUTROS PCT % 68   LYMPHS PCT % 21   MONOS PCT % 7   EOS PCT % 3     Results from last 7 days   Lab Units 11/04/19  0328 11/03/19  0513   SODIUM mmol/L 133* 134*   POTASSIUM mmol/L 3 6 3 6   CHLORIDE mmol/L 91* 97*   CO2 mmol/L 25 23   BUN mg/dL 12 10   CREATININE mg/dL 0 81 0 88   ANION GAP mmol/L 17* 14*   CALCIUM mg/dL 9 0 9 3   ALBUMIN g/dL  --  3 7   TOTAL BILIRUBIN mg/dL  --  0 41   ALK PHOS U/L  --  113   ALT U/L  --  32   AST U/L  --  28   GLUCOSE RANDOM mg/dL 260* 336*     Results from last 7 days   Lab Units 11/03/19  1257   INR  1 02     Results from last 7 days   Lab Units 11/04/19  0726 11/03/19  1954 11/03/19  1618 11/03/19  1150 11/03/19  0827   POC GLUCOSE mg/dl 256* 198* 223* 243* 311*                   * I Have Reviewed All Lab Data Listed Above  * Additional Pertinent Lab Tests Reviewed:  All Labs Within Last 24 Hours Reviewed    Imaging:    Imaging Reports Reviewed Today Include: reviewed  Imaging Personally Reviewed by Myself Includes:  none    Recent Cultures (last 7 days):           Last 24 Hours Medication List:     Current Facility-Administered Medications:  acetaminophen 650 mg Oral Q4H PRN LAUREEN Osman    aluminum-magnesium hydroxide-simethicone 30 mL Oral Q6H PRN LAUREEN Osman    aspirin 325 mg Oral TID Duran Shah MD    atorvastatin 40 mg Oral Daily With [id number] [facility], LAUREEN    clopidogrel 75 mg Oral Daily LAUREEN Osman    heparin (porcine) 3-20 Units/kg/hr (Order-Specific) Intravenous Titrated Duran Shah MD Last Rate: 19 1 Units/kg/hr (11/04/19 5785)   heparin (porcine) 2,000 Units Intravenous PRN Samia Wakefield MD    heparin (porcine) 4,000 Units Intravenous PRN Samia Wakefield MD    insulin glargine 10 Units Subcutaneous HS Viola Lopez PA-C    insulin lispro 2-12 Units Subcutaneous TID AC Justin Breen MD    lisinopril 10 mg Oral Daily LAUREEN Huang    metoprolol succinate 50 mg Oral Daily LAUREEN Huang    morphine injection 1 mg Intravenous Q4H PRN LAUREEN Huang    nitroglycerin 0 4 mg Sublingual Q5 Min PRN LAUREEN Huang    ondansetron 4 mg Intravenous Q6H PRN LAUREEN Huang         Today, Patient Was Seen By: Laura Lorenzana PA-C    ** Please Note: Dictation voice to text software may have been used in the creation of this document   **

## 2019-11-04 NOTE — UTILIZATION REVIEW
Continued Stay Review    Date:    11/4/2019                          Current Patient Class:    INpatient  Current Level of Care:   Med  surg    HPI:34 y o  male initially admitted on   OBSERVATION    121/3  @    0615     IP ORDER  ENTERED   11/4  @   0542  The patient will continue to require additional inpatient hospital stay due to NSTEMI    /04/19 0856  Inpatient Admission Once     Transfer Service: General Medicine       Question Answer Comment   Admitting Physician Gretta Das    Level of Care Med Surg    Estimated length of stay More than 2 Midnights    Certification I certify that inpatient services are medically necessary for this patient for a duration of greater than two midnights  See H&P and MD Progress Notes for additional information about the patient's course of treatment  11/04/19 0855         Assessment/Plan:     Pt  Initially admitted with chest pain  With H/O  CAD, NSTEMI S/P  PCI/JESUS MANUEL X 2 to the circumflex  And  OM1  Now with excalating troponin to 8  29  Cont   Ip  LOC  For  NSTEMI  And  req  Cardiac cath and  IV heparin  Cont  BB and ace inhibitor      Pertinent Labs/Diagnostic Results:     EKG  ST  No  ST elevation    Results from last 7 days   Lab Units 11/04/19  0328 11/03/19  1258 11/03/19  0513   WBC Thousand/uL 8 36 11 04* 8 60   HEMOGLOBIN g/dL 14 7 14 3 15 9   HEMATOCRIT % 44 2 42 5 46 8   PLATELETS Thousands/uL 258 271 311   NEUTROS ABS Thousands/µL 5 79  --  5 71         Results from last 7 days   Lab Units 11/04/19  0328 11/03/19  0513   SODIUM mmol/L 133* 134*   POTASSIUM mmol/L 3 6 3 6   CHLORIDE mmol/L 91* 97*   CO2 mmol/L 25 23   ANION GAP mmol/L 17* 14*   BUN mg/dL 12 10   CREATININE mg/dL 0 81 0 88   EGFR ml/min/1 73sq m 115 111   CALCIUM mg/dL 9 0 9 3     Results from last 7 days   Lab Units 11/03/19  0513   AST U/L 28   ALT U/L 32   ALK PHOS U/L 113   TOTAL PROTEIN g/dL 8 6*   ALBUMIN g/dL 3 7   TOTAL BILIRUBIN mg/dL 0 41     Results from last 7 days   Lab Units 11/04/19  1248 11/04/19  1143 11/04/19  0827 11/04/19  0726 11/03/19  1954 11/03/19  1618 11/03/19  1150 11/03/19  0827   POC GLUCOSE mg/dl 204* 190* 257* 256* 198* 223* 243* 311*     Results from last 7 days   Lab Units 11/04/19  0328 11/03/19  0513   GLUCOSE RANDOM mg/dL 260* 336*         Results from last 7 days   Lab Units 11/04/19  0328   HEMOGLOBIN A1C % 9 3*   EAG mg/dl 220            Results from last 7 days   Lab Units 11/03/19  0551   PH PAULA  7 388   PCO2 PAULA mm Hg 34 3*   PO2 PAULA mm Hg 42 1   HCO3 PAULA mmol/L 20 2*   BASE EXC PAULA mmol/L -3 9   O2 CONTENT PAULA ml/dL 15 0   O2 HGB, VENOUS % 74 1             Results from last 7 days   Lab Units 11/04/19  0328 11/03/19  1850 11/03/19  1457 11/03/19  1121 11/03/19  0833   TROPONIN I ng/mL 8 29* 6 72* 2 38* 0 38* 0 08*     Results from last 7 days   Lab Units 11/03/19  1121   D-DIMER QUANTITATIVE ug/ml FEU 0 50*     Results from last 7 days   Lab Units 11/04/19  1027 11/04/19  0333 11/03/19 2029 11/03/19  1257   PROTIME seconds  --   --   --  13 5   INR   --   --   --  1 02   PTT seconds 56* 41* 28 25               Results from last 7 days   Lab Units 11/03/19  0513   NT-PRO BNP pg/mL 18                 Results from last 7 days   Lab Units 11/03/19  1120 11/03/19  0513   CRP mg/L  --  16 3*   SED RATE mm/hour 17*  --          Vital Signs:   98 4 °F (36 9 °C)  88  18  107/74  97 %  None (Room air)  Lying     11/04/19 0901        115/65         11/04/19 0700  97 8 °F (36 6 °C)  86  18  112/78  97 %  None (Room air)  Lying         Medications:   Scheduled Medications:    Medications:  aspirin 325 mg Oral TID   atorvastatin 40 mg Oral Daily With Dinner   clopidogrel 75 mg Oral Daily   insulin glargine 10 Units Subcutaneous HS   insulin lispro 2-12 Units Subcutaneous TID AC   lisinopril 10 mg Oral Daily   metoprolol succinate 50 mg Oral Daily     Continuous IV Infusions:    heparin (porcine) 3-20 Units/kg/hr (Order-Specific) Intravenous Titrated     PRN Meds:    acetaminophen 650 mg Oral Q4H PRN   aluminum-magnesium hydroxide-simethicone 30 mL Oral Q6H PRN   heparin (porcine) 2,000 Units Intravenous PRN   heparin (porcine) 4,000 Units Intravenous PRN   morphine injection 1 mg Intravenous Q4H PRN   nitroglycerin 0 4 mg Sublingual Q5 Min PRN   ondansetron 4 mg Intravenous Q6H PRN       Discharge Plan:   home    Network Utilization Review Department  [email or web address]  ATTENTION: Please call with any questions or concerns to [phone] and carefully listen to the prompts so that you are directed to the right person  All voicemails are confidential   Eflipa Muss all requests for admission clinical reviews, approved or denied determinations and any other requests to dedicated fax number below belonging to the campus where the patient is receiving treatment    FACILITY NAME UR FAX NUMBER   ADMISSION DENIALS (Administrative/Medical Necessity) [address] [facility] (Maternity/NICU/Pediatrics) [phone]   [facility] [address] [facility] [phone]   [facility] [address] [facility] [phone]   [place] [address] [phone]

## 2019-11-04 NOTE — PLAN OF CARE
Problem: Potential for Falls  Goal: Patient will remain free of falls  Description  INTERVENTIONS:  - Assess patient frequently for physical needs  -  Identify cognitive and physical deficits and behaviors that affect risk of falls    -  [place] fall precautions as indicated by assessment   - Educate patient/family on patient safety including physical limitations  - Instruct patient to call for assistance with activity based on assessment  - Modify environment to reduce risk of injury  - Consider OT/PT consult to assist with strengthening/mobility  Outcome: Progressing     Problem: CARDIOVASCULAR - ADULT  Goal: Maintains optimal cardiac output and hemodynamic stability  Description  INTERVENTIONS:  - Monitor I/O, vital signs and rhythm  - Monitor for S/S and trends of decreased cardiac output  - Administer and titrate ordered vasoactive medications to optimize hemodynamic stability  - Assess quality of pulses, skin color and temperature  - Assess for signs of decreased coronary artery perfusion  - Instruct patient to report change in severity of symptoms  Outcome: Progressing  Goal: Absence of cardiac dysrhythmias or at baseline rhythm  Description  INTERVENTIONS:  - Continuous cardiac monitoring, vital signs, obtain 12 lead EKG if ordered  - Administer antiarrhythmic and heart rate control medications as ordered  - Monitor electrolytes and administer replacement therapy as ordered  Outcome: Progressing     Problem: METABOLIC, FLUID AND ELECTROLYTES - ADULT  Goal: Glucose maintained within target range  Description  INTERVENTIONS:  - Monitor Blood Glucose as ordered  - Assess for signs and symptoms of hyperglycemia and hypoglycemia  - Administer ordered medications to maintain glucose within target range  - Assess nutritional intake and initiate nutrition service referral as needed  Outcome: Progressing     Problem: PAIN - ADULT  Goal: Verbalizes/displays adequate comfort level or baseline comfort level  Description  Interventions:  - Encourage patient to monitor pain and request assistance  - Assess pain using appropriate pain scale  - Administer analgesics based on type and severity of pain and evaluate response  - Implement non-pharmacological measures as appropriate and evaluate response  - Consider cultural and social influences on pain and pain management  - Notify physician/advanced practitioner if interventions unsuccessful or patient reports new pain  Outcome: Progressing     Problem: INFECTION - ADULT  Goal: Absence or prevention of progression during hospitalization  Description  INTERVENTIONS:  - Assess and monitor for signs and symptoms of infection  - Monitor lab/diagnostic results  - Monitor all insertion sites, i e  indwelling lines, tubes, and drains  - Monitor endotracheal if appropriate and nasal secretions for changes in amount and color  - [place] appropriate cooling/warming therapies per order  - Administer medications as ordered  - Instruct and encourage patient and family to use good hand hygiene technique  - Identify and instruct in appropriate isolation precautions for identified infection/condition  Outcome: Progressing     Problem: SAFETY ADULT  Goal: Maintain or return to baseline ADL function  Description  INTERVENTIONS:  -  Assess patient's ability to carry out ADLs; assess patient's baseline for ADL function and identify physical deficits which impact ability to perform ADLs (bathing, care of mouth/teeth, toileting, grooming, dressing, etc )  - Assess/evaluate cause of self-care deficits   - Assess range of motion  - Assess patient's mobility; develop plan if impaired  - Assess patient's need for assistive devices and provide as appropriate  - Encourage maximum independence but intervene and supervise when necessary  - Involve family in performance of ADLs  - Assess for home care needs following discharge   - Consider OT consult to assist with ADL evaluation and planning for discharge  - Provide patient education as appropriate  Outcome: Progressing  Goal: Maintain or return mobility status to optimal level  Description  INTERVENTIONS:  - Assess patient's baseline mobility status (ambulation, transfers, stairs, etc )    - Identify cognitive and physical deficits and behaviors that affect mobility  - Identify mobility aids required to assist with transfers and/or ambulation (gait belt, sit-to-stand, lift, walker, cane, etc )  - [place] fall precautions as indicated by assessment  - Record patient progress and toleration of activity level on Mobility SBAR; progress patient to next Phase/Stage  - Instruct patient to call for assistance with activity based on assessment  - Consider rehabilitation consult to assist with strengthening/weightbearing, etc   Outcome: Progressing     Problem: DISCHARGE PLANNING  Goal: Discharge to home or other facility with appropriate resources  Description  INTERVENTIONS:  - Identify barriers to discharge w/patient and caregiver  - Arrange for needed discharge resources and transportation as appropriate  - Identify discharge learning needs (meds, wound care, etc )  - Arrange for interpretive services to assist at discharge as needed  - Refer to Case Management Department for coordinating discharge planning if the patient needs post-hospital services based on physician/advanced practitioner order or complex needs related to functional status, cognitive ability, or social support system  Outcome: Progressing     Problem: Knowledge Deficit  Goal: Patient/family/caregiver demonstrates understanding of disease process, treatment plan, medications, and discharge instructions  Description  Complete learning assessment and assess knowledge base    Interventions:  - Provide teaching at level of understanding  - Provide teaching via preferred learning methods  Outcome: Progressing

## 2019-11-04 NOTE — SOCIAL WORK
Cm met with pt and family at bedside to discuss cm role and dcp needs  Pt lives with wife and mother in a  3 SH home with 14 CARMENCITA to get in and 3 flights of stairs to go to the BR  Pt is independent with all ADL's and ambulation  No DME's  No prior hx of SNF or HHC  Denies any MH or D/A IP rehab  Pt uses [facility] on [address] for his rx needs  Pt drives but family will transport at discharge  Pt came with CP is for a cardiac cath at 2 pm  Anticipating d/c alex  Cm will f/u for final medical clearance and dcp needs  CM reviewed d/c planning process including the following: identifying help at home, patient preference for d/c planning needs, [facility] Meds to Bed program, availability of treatment team to discuss questions or concerns patient and/or family may have regarding understanding medications and recognizing signs and symptoms once discharged  CM also encouraged patient to follow up with all recommended appointments after discharge  Patient advised of importance for patient and family to participate in managing patients medical well being

## 2019-11-04 NOTE — BRIEF OP NOTE (RAD/CATH)
Right radial catheterization    Findings:    Left main coronary artery:  Nonobstructive    Lad:  Mild luminal irregularities    Left circumflex:  Patent mid and obtuse marginal stent  Distal vessel remains diffuse moderately atherosclerotic with 50-60% disease as evidenced 2014    Right coronary artery:  Diffuse 40-50% disease in the proximal segment  40% distal disease       Impression:  No definitive atherosclerotic plaque rupture to explain patient's elevated troponin  Overall, angiography appears mostly 2000    Plan:  patient needs to comply with antiplatelets, high potency statin,  weight loss and sensible/Mediterranean type diet

## 2019-11-04 NOTE — PROGRESS NOTES
Spoke with Dr Sveta Quinones about most recent trop  Verbal received for NPO after midnight for possible cath tomorrow  Will repeat trop in the morning with labs

## 2019-11-05 ENCOUNTER — APPOINTMENT (INPATIENT)
Dept: NON INVASIVE DIAGNOSTICS | Facility: HOSPITAL | Age: 34
DRG: 287 | End: 2019-11-05
Payer: COMMERCIAL

## 2019-11-05 VITALS
HEART RATE: 77 BPM | SYSTOLIC BLOOD PRESSURE: 116 MMHG | BODY MASS INDEX: 44.85 KG/M2 | OXYGEN SATURATION: 97 % | RESPIRATION RATE: 20 BRPM | DIASTOLIC BLOOD PRESSURE: 70 MMHG | HEIGHT: 65 IN | WEIGHT: 269.18 LBS | TEMPERATURE: 97.5 F

## 2019-11-05 PROBLEM — I31.9 PERICARDITIS: Status: ACTIVE | Noted: 2019-11-05

## 2019-11-05 PROBLEM — I21.4 NSTEMI (NON-ST ELEVATED MYOCARDIAL INFARCTION) (HCC): Status: RESOLVED | Noted: 2018-11-26 | Resolved: 2019-11-05

## 2019-11-05 LAB
ANION GAP SERPL CALCULATED.3IONS-SCNC: 9 MMOL/L (ref 4–13)
BUN SERPL-MCNC: 14 MG/DL (ref 5–25)
CALCIUM SERPL-MCNC: 8.7 MG/DL (ref 8.3–10.1)
CHLORIDE SERPL-SCNC: 104 MMOL/L (ref 100–108)
CO2 SERPL-SCNC: 24 MMOL/L (ref 21–32)
CREAT SERPL-MCNC: 0.75 MG/DL (ref 0.6–1.3)
ERYTHROCYTE [DISTWIDTH] IN BLOOD BY AUTOMATED COUNT: 12.9 % (ref 11.6–15.1)
GFR SERPL CREATININE-BSD FRML MDRD: 119 ML/MIN/1.73SQ M
GLUCOSE SERPL-MCNC: 196 MG/DL (ref 65–140)
GLUCOSE SERPL-MCNC: 206 MG/DL (ref 65–140)
GLUCOSE SERPL-MCNC: 237 MG/DL (ref 65–140)
HCT VFR BLD AUTO: 42 % (ref 36.5–49.3)
HGB BLD-MCNC: 13.7 G/DL (ref 12–17)
MCH RBC QN AUTO: 30.2 PG (ref 26.8–34.3)
MCHC RBC AUTO-ENTMCNC: 32.6 G/DL (ref 31.4–37.4)
MCV RBC AUTO: 93 FL (ref 82–98)
PLATELET # BLD AUTO: 238 THOUSANDS/UL (ref 149–390)
PMV BLD AUTO: 10.6 FL (ref 8.9–12.7)
POTASSIUM SERPL-SCNC: 4.1 MMOL/L (ref 3.5–5.3)
RBC # BLD AUTO: 4.53 MILLION/UL (ref 3.88–5.62)
SODIUM SERPL-SCNC: 137 MMOL/L (ref 136–145)
WBC # BLD AUTO: 6.59 THOUSAND/UL (ref 4.31–10.16)

## 2019-11-05 PROCEDURE — 93306 TTE W/DOPPLER COMPLETE: CPT | Performed by: INTERNAL MEDICINE

## 2019-11-05 PROCEDURE — 80048 BASIC METABOLIC PNL TOTAL CA: CPT | Performed by: PHYSICIAN ASSISTANT

## 2019-11-05 PROCEDURE — 85027 COMPLETE CBC AUTOMATED: CPT | Performed by: PHYSICIAN ASSISTANT

## 2019-11-05 PROCEDURE — 82948 REAGENT STRIP/BLOOD GLUCOSE: CPT

## 2019-11-05 PROCEDURE — 93306 TTE W/DOPPLER COMPLETE: CPT

## 2019-11-05 PROCEDURE — 99239 HOSP IP/OBS DSCHRG MGMT >30: CPT | Performed by: PHYSICIAN ASSISTANT

## 2019-11-05 PROCEDURE — 99232 SBSQ HOSP IP/OBS MODERATE 35: CPT | Performed by: INTERNAL MEDICINE

## 2019-11-05 RX ORDER — ATORVASTATIN CALCIUM 40 MG/1
40 TABLET, FILM COATED ORAL
Qty: 30 TABLET | Refills: 0 | Status: SHIPPED | OUTPATIENT
Start: 2019-11-05

## 2019-11-05 RX ORDER — PANTOPRAZOLE SODIUM 40 MG/1
40 TABLET, DELAYED RELEASE ORAL
Qty: 30 TABLET | Refills: 0 | Status: SHIPPED | OUTPATIENT
Start: 2019-11-06

## 2019-11-05 RX ORDER — COLCHICINE 0.6 MG/1
0.6 TABLET ORAL 2 TIMES DAILY
Qty: 60 TABLET | Refills: 0 | Status: SHIPPED | OUTPATIENT
Start: 2019-11-05 | End: 2019-12-05

## 2019-11-05 RX ORDER — GLIPIZIDE 5 MG/1
5 TABLET, FILM COATED, EXTENDED RELEASE ORAL DAILY
Qty: 30 TABLET | Refills: 0 | Status: SHIPPED | OUTPATIENT
Start: 2019-11-05

## 2019-11-05 RX ORDER — ENALAPRIL MALEATE 2.5 MG/1
2.5 TABLET ORAL DAILY
Qty: 30 TABLET | Refills: 0 | Status: SHIPPED | OUTPATIENT
Start: 2019-11-05

## 2019-11-05 RX ORDER — ASPIRIN 325 MG
325 TABLET ORAL 3 TIMES DAILY
Qty: 30 TABLET | Refills: 0 | Status: SHIPPED | OUTPATIENT
Start: 2019-11-05 | End: 2019-11-15

## 2019-11-05 RX ORDER — METOPROLOL SUCCINATE 50 MG/1
50 TABLET, EXTENDED RELEASE ORAL DAILY
Qty: 30 TABLET | Refills: 0 | Status: SHIPPED | OUTPATIENT
Start: 2019-11-05

## 2019-11-05 RX ORDER — ASPIRIN 81 MG/1
81 TABLET ORAL DAILY
Qty: 30 TABLET | Refills: 0 | Status: SHIPPED | OUTPATIENT
Start: 2019-11-16

## 2019-11-05 RX ADMIN — INSULIN LISPRO 2 UNITS: 100 INJECTION, SOLUTION INTRAVENOUS; SUBCUTANEOUS at 09:22

## 2019-11-05 RX ADMIN — COLCHICINE 0.6 MG: 0.6 TABLET, FILM COATED ORAL at 09:21

## 2019-11-05 RX ADMIN — PANTOPRAZOLE SODIUM 40 MG: 40 TABLET, DELAYED RELEASE ORAL at 05:10

## 2019-11-05 RX ADMIN — METOPROLOL SUCCINATE 50 MG: 50 TABLET, EXTENDED RELEASE ORAL at 09:21

## 2019-11-05 RX ADMIN — ASPIRIN 325 MG ORAL TABLET 325 MG: 325 PILL ORAL at 09:20

## 2019-11-05 RX ADMIN — LISINOPRIL 10 MG: 10 TABLET ORAL at 09:21

## 2019-11-05 RX ADMIN — IBUPROFEN 600 MG: 600 TABLET ORAL at 09:21

## 2019-11-05 RX ADMIN — CLOPIDOGREL BISULFATE 75 MG: 75 TABLET ORAL at 09:21

## 2019-11-05 RX ADMIN — INSULIN LISPRO 4 UNITS: 100 INJECTION, SOLUTION INTRAVENOUS; SUBCUTANEOUS at 11:55

## 2019-11-05 NOTE — SOCIAL WORK
Viola Milton informed cm that pt is written for d/c and will get scripts for new medications  Pt has no insurance and was given [employer] RX discount card to use on prescribed meds  Pt will d/c home with no needs  Transport is at bedside

## 2019-11-05 NOTE — ASSESSMENT & PLAN NOTE
Noncompliant with medications and outpatient follow-up as patient lose helath insurance   Prescribed medications on [facility] $4 list and given [facility] card   Educated importance of follow up with PCP

## 2019-11-05 NOTE — ASSESSMENT & PLAN NOTE
· Continue Toprol XL and lisinopril substituted for enalapril   · Will continue enalapril upon discharge

## 2019-11-05 NOTE — DISCHARGE INSTRUCTIONS
Pericarditis Aguda   LO QUE NECESITA SABER:   ¿Qué es la pericarditis aguda? La pericarditis aguda es alicia inflamación del pericardio  El pericardio es el saco josue que [place]  [place] cantidad de líquido hoang que se encuentra entre hatch corazón y el saco le permite al corazón latir con facilidad  Con la pericarditis aguda, la cantidad de líquido [place] y podría contener pus  [place] puede conllevar a problemas con la manera en que el corazón late  ¿Qué causa la pericarditis aguda? Es probable que la causa no sea conocida  La pericarditis podría ser causada por cualquiera de los siguientes:  · Daño al saco:  Las lesiones o accidentes que causan un golpe amanda en el pecho, podrían dañar el saco      · Gérmenes:  Los gérmenes, veronica virus y bacterias, podrían causar [employer]  Las infecciones en otras áreas del cuerpo también podrían propagarse hacia el saco     · Medicamentos:  Los medicamentos veronica los que se usan para tratar la presión link, el cáncer e infección pueden causar pericarditis  · Procedimientos:  Cirugía del corazón o terapia de radiación pueden causar pericarditis  · Afecciones médicas:      ¨ Ataque al corazón:  Un ataque al corazón puede dañar el músculo cardíaco y causar pericarditis  ¨ Insuficiencia renal:  Líquidos y químicos podrían acumularse en el cuerpo y alrededor del corazón cuando jarett riñones fallan  ¨ Otras condiciones: Las enfermedades autoinmunes, el cáncer o la tuberculosis podrían dañar el saco o aumentar la cantidad de líquido  Si usted [employer]line, hatch bebé en desarrollo podría empujarle el corazón y causarle problemas  ¿Cuáles son los signos y síntomas de la pericarditis aguda? Los signos y síntomas podrían aparecer repentinamente y empeorar rápidamente   Puede presentar cualquiera de los siguientes signos o síntomas:  · Dolor en hatch pecho que empeora cuando se acuesta    · Latidos cardíacos rápidos    · Dificultad para respirar    · DOUG Barrera o escalofríos    · Se siente más cansado de lo normal y se cansa rápidamente  ¿Cómo se diagnostica la pericarditis aguda? Hatch médico le examinará y le preguntará sobre problemas médicos que usted ha tenido en el pasado  El escuchará hatch corazón  También se le podría realizar alguno de los siguientes exámenes:  · Análisis de caroline:  Es posible que le extraigan caroline para proveer información a jarett médicos sobre el funcionamiento de hatch cuerpo  La caroline podría extraerse de la mano, del brazo o por medio de alicia vía IV  · Telemetría  es un monitoreo continuo de hatch ritmo cardíaco  Para roni monitoreo a usted le colocarán [employer] en la piel que a la vez se conectan a la máquina del ecocardiograma que graba hatch ritmo cardíaco     · Radiografía del tórax:  Es alicia imagen de jarett pulmones y hatch corazón  Los médicos la usan para sabina el líquido alrededor de hatch corazón  Los savi x del pecho muestran signos de infección en el pericardio  · Un ecocardiograma  es un tipo de [place]  Se usan ondas sonoras para mostrar la estructura y función de hatch corazón  · Ecocardiograma transesofágico:        ¨ Alicia ecocardiografía transesofágica (SHIRA) es un tipo de ultrasonido que enseña imágenes del tamaño y forma de hatch corazón  También muestra la forma veronica hatch corazón se mueve al palpitar  Estas imágenes pueden observarse en alicia pantalla con aspecto de televisor  Es posible que usted necesite un ecocardiograma transesofágico si hatch corazón no se puede sabina muy edwina en un ecocardiograma regular  También es posible que necesite un ecocardiograma transesofágico para revisar ciertos problemas veronica coágulos de [place] o infecciones dentro de hatch corazón  ¨ Le aplicarán un medicamento para que usted permanezca relajado amalia el ecocardiograma transesofágico  Los médicos colocarán un tubo en hatch boca y lo harán llegar hasta hatch esófago  El tubo está equipado con un sensor de ultrasonido en el extremo   Puesto que hatch esófago está ubicado junto al corazón, hatch médico podrá sabina claramente hatch corazón  · Tomografía computarizada:  Esta también es llamada TAC  El equipo de savi X [place] alicia computadora para carmen imágenes de hatch corazón  Es posible que le administren un tinte antes de carmen las imágenes  Las [employer] son alérgicas al yodo o a los mariscos (cangrejo, langosta o camarones) podrían ser Judeen Ravia a algunos tintes  Lysbeth Dragon a hatch médico si usted es alérgico a los mariscos o si tienes alergias o condiciones médicas  · Imagen por resonancia magnética:  Esta prueba se llama alicia IRM  [place] la RM, se lenka imágenes de hatch corazón  [employer] RM podría ser Cecilia Lipa para mostrar la cantidad de líquido alrededor de hatch corazón  Usted tendrá que acostarse y no moverse [place] tatum examen  La máquina IRM contiene un imán muy amanda  Nunca entre al cuarto de IRM con objetos metálicos  [place] puede causar lesiones graves  Dígale a hatch médico si usted tiene un implante de metal en hatch cuerpo  · Pericardiocentesis:  [place] es un procedimiento donde el líquido del saco se drena usando alicia Connor Meckel  Tatum líquido se coby al laboratorio para revisar si existe infección  · Biopsia del pericárdico:  [place] es un procedimiento en el que alicia pequeña pieza del saco cardíaco se remueve  Se envía después al laboratorio para análisis  ¿Cómo se trata la pericarditis aguda? · AINEs (Analgésicos antiinflamatorios no esteroides) veronica el ibuprofeno, ayudan a disminuir la inflamación, el dolor y la [place]  Tatum medicamento esta disponible con o sin alicia receta médica  Los AINEs pueden causar sangrado estomacal o problemas renales en ciertas personas  Si usted michael un medicamento anticoagulante, siempre pregúntele a hatch médico si los MARBELLA son seguros para usted  Siempre tiffanie la etiqueta de tatum medicamento y [place] instrucciones  · Antibióticos:  [place]atum medicamento se administra para ayudar a tratar o prevenir alicia infección causada por bacteria  · Esteroides:  [place] medicamento podría ser administrado para disminuir inflamación  ¿Cuáles son los riesgos de la pericarditis aguda? Usted podría tener infección o sangrado cuando se remueva el líquido del Komal Avers después del tratamiento, hatch condición podría regresar  Sin tratamiento, hatch corazón puede cicatrizar  Hatch corazón podría no latir correctamente y puede que no llegue suficiente caroline y oxígeno a los órganos de hatch cuerpo  [place] podría dañar [place]tha [place] y poner en peligro hatch waldemar  ¿Cuándo vitaly comunicarme con mi médico?  Pregúntele a hatch [place] vitaminas y minerales son adecuados para usted  · Usted tiene fiebre  · Los síntomas no desaparecen o empeoran  · Usted tiene preguntas o inquietudes acerca de hatch condición o cuidado  ¿Cuándo vitaly buscar ayuda inmediata? Busque ayuda inmediatamente o llame al 911 si:  · Le falta el aire, y esto empeora cuando está NOKIA  · El dolor en hatch pecho no desaparece  ACUERDOS SOBRE HATCH CUIDADO:   Usted tiene el derecho de ayudar a planear hatch cuidado  Aprenda todo lo que pueda sobre hatch condición y veronica darle tratamiento  Discuta jarett opciones de tratamiento con jarett médicos para decidir el cuidado que usted desea recibir  Usted siempre tiene el derecho de rechazar el tratamiento  Esta información es sólo para uso en educación  Hatch intención no es darle un consejo médico sobre enfermedades o tratamientos  Colsulte con hatch [place] farmacéutico antes de seguir cualquier régimen médico para saber si es seguro y efectivo para usted  © 2017 [facility] Information is for End User's use only and may not be sold, redistributed or otherwise used for commercial purposes  All illustrations and images included in CareNotes® are the copyrighted property of A D A M , Inc  or Dylan Martinez

## 2019-11-05 NOTE — PLAN OF CARE
Problem: Potential for Falls  Goal: Patient will remain free of falls  Description  INTERVENTIONS:  - Assess patient frequently for physical needs  -  Identify cognitive and physical deficits and behaviors that affect risk of falls    -  [place] fall precautions as indicated by assessment   - Educate patient/family on patient safety including physical limitations  - Instruct patient to call for assistance with activity based on assessment  - Modify environment to reduce risk of injury  - Consider OT/PT consult to assist with strengthening/mobility  Outcome: Progressing     Problem: CARDIOVASCULAR - ADULT  Goal: Maintains optimal cardiac output and hemodynamic stability  Description  INTERVENTIONS:  - Monitor I/O, vital signs and rhythm  - Monitor for S/S and trends of decreased cardiac output  - Administer and titrate ordered vasoactive medications to optimize hemodynamic stability  - Assess quality of pulses, skin color and temperature  - Assess for signs of decreased coronary artery perfusion  - Instruct patient to report change in severity of symptoms  Outcome: Progressing  Goal: Absence of cardiac dysrhythmias or at baseline rhythm  Description  INTERVENTIONS:  - Continuous cardiac monitoring, vital signs, obtain 12 lead EKG if ordered  - Administer antiarrhythmic and heart rate control medications as ordered  - Monitor electrolytes and administer replacement therapy as ordered  Outcome: Progressing     Problem: METABOLIC, FLUID AND ELECTROLYTES - ADULT  Goal: Glucose maintained within target range  Description  INTERVENTIONS:  - Monitor Blood Glucose as ordered  - Assess for signs and symptoms of hyperglycemia and hypoglycemia  - Administer ordered medications to maintain glucose within target range  - Assess nutritional intake and initiate nutrition service referral as needed  Outcome: Progressing     Problem: PAIN - ADULT  Goal: Verbalizes/displays adequate comfort level or baseline comfort level  Description  Interventions:  - Encourage patient to monitor pain and request assistance  - Assess pain using appropriate pain scale  - Administer analgesics based on type and severity of pain and evaluate response  - Implement non-pharmacological measures as appropriate and evaluate response  - Consider cultural and social influences on pain and pain management  - Notify physician/advanced practitioner if interventions unsuccessful or patient reports new pain  Outcome: Progressing     Problem: INFECTION - ADULT  Goal: Absence or prevention of progression during hospitalization  Description  INTERVENTIONS:  - Assess and monitor for signs and symptoms of infection  - Monitor lab/diagnostic results  - Monitor all insertion sites, i e  indwelling lines, tubes, and drains  - Monitor endotracheal if appropriate and nasal secretions for changes in amount and color  - [place] appropriate cooling/warming therapies per order  - Administer medications as ordered  - Instruct and encourage patient and family to use good hand hygiene technique  - Identify and instruct in appropriate isolation precautions for identified infection/condition  Outcome: Progressing     Problem: SAFETY ADULT  Goal: Maintain or return to baseline ADL function  Description  INTERVENTIONS:  -  Assess patient's ability to carry out ADLs; assess patient's baseline for ADL function and identify physical deficits which impact ability to perform ADLs (bathing, care of mouth/teeth, toileting, grooming, dressing, etc )  - Assess/evaluate cause of self-care deficits   - Assess range of motion  - Assess patient's mobility; develop plan if impaired  - Assess patient's need for assistive devices and provide as appropriate  - Encourage maximum independence but intervene and supervise when necessary  - Involve family in performance of ADLs  - Assess for home care needs following discharge   - Consider OT consult to assist with ADL evaluation and planning for discharge  - Provide patient education as appropriate  Outcome: Progressing  Goal: Maintain or return mobility status to optimal level  Description  INTERVENTIONS:  - Assess patient's baseline mobility status (ambulation, transfers, stairs, etc )    - Identify cognitive and physical deficits and behaviors that affect mobility  - Identify mobility aids required to assist with transfers and/or ambulation (gait belt, sit-to-stand, lift, walker, cane, etc )  - [place] fall precautions as indicated by assessment  - Record patient progress and toleration of activity level on Mobility SBAR; progress patient to next Phase/Stage  - Instruct patient to call for assistance with activity based on assessment  - Consider rehabilitation consult to assist with strengthening/weightbearing, etc   Outcome: Progressing     Problem: DISCHARGE PLANNING  Goal: Discharge to home or other facility with appropriate resources  Description  INTERVENTIONS:  - Identify barriers to discharge w/patient and caregiver  - Arrange for needed discharge resources and transportation as appropriate  - Identify discharge learning needs (meds, wound care, etc )  - Arrange for interpretive services to assist at discharge as needed  - Refer to Case Management Department for coordinating discharge planning if the patient needs post-hospital services based on physician/advanced practitioner order or complex needs related to functional status, cognitive ability, or social support system  Outcome: Progressing     Problem: Knowledge Deficit  Goal: Patient/family/caregiver demonstrates understanding of disease process, treatment plan, medications, and discharge instructions  Description  Complete learning assessment and assess knowledge base    Interventions:  - Provide teaching at level of understanding  - Provide teaching via preferred learning methods  Outcome: Progressing     Problem: Nutrition/Hydration-ADULT  Goal: Nutrient/Hydration intake appropriate for improving, restoring or maintaining nutritional needs  Description  Monitor and assess patient's nutrition/hydration status for malnutrition  Collaborate with interdisciplinary team and initiate plan and interventions as ordered  Monitor patient's weight and dietary intake as ordered or per policy  Utilize nutrition screening tool and intervene as necessary  Determine patient's food preferences and provide heart healthy, DM diet    INTERVENTIONS:  - Monitor oral intake, urinary output, labs, and treatment plans  - Assess nutrition and hydration status and recommend course of action  - Evaluate amount of meals eaten  - Assist patient with eating if necessary   - Allow adequate time for meals  - Recommend/ encourage appropriate diets, oral nutritional supplements, and vitamin/mineral supplements  - Order, calculate, and assess calorie counts as needed  - Recommend, monitor, and adjust tube feedings and TPN/PPN based on assessed needs  - Assess need for intravenous fluids  - Provide specific nutrition/hydration education as appropriate  - Include patient/family/caregiver in decisions related to nutrition   Outcome: Progressing

## 2019-11-05 NOTE — DISCHARGE SUMMARY
Discharge- Jaleel Leblanc 1985, 27 y o  male MRN: [id number]  Unit/Bed#: E4 -01 Encounter: [id number]  Primary Care Provider: No primary care provider on file  Date and time admitted to hospital: 11/3/2019  4:59 AM    * NSTEMI (non-ST elevated myocardial infarction) [facility])  Assessment & Plan  · Reports chest pain radiating to both arms associated with shortness of breath  · H/o MI 2014, S/p cardiac cath, 100% stenosis OM, 95% stenosis mid circumflex, s/p JESUS MANUEL x2, on DAPT/ASA and plavix  · Serial troponin continued to trend up to peak 8 29   · CTA chest negative for PE   · Appreciate Cardiology consult   · Underwent cardiac cath on 11/4/19: patent circumflex   unchanged moderate nonobstructive distal circumflex (diffuse 50% stenosis) and proximal RCA disease (diffuse 60% stenosis), 40% stenosis distal RCA   · With normal cardiac cath, chest pain with elevated troponin and elevated ESR/CRP concern for pericarditis, see plan as below   · Continue medical management with aspirin, statin  · Control of risk factors including HTN and DM2   · , HDL 31, triglycerides 526, total cholesterol 331   · Educated on importance of medical compliance         Pericarditis  Assessment & Plan  Normal cardiac cath 11/4/19   With cath without obstructive disease, elevated ESR/CRP and elevated troponin suggestive of acute pericarditis   Will continue aspirin TID dosing for 10 days then continue 81 mg once daily thereafter   Will also continue colchicine for 30 days   Continue protonix for gastric protection   Follow up with Cardiology outpatient     Essential hypertension  Assessment & Plan  · Continue Toprol XL and lisinopril substituted for enalapril   · Will continue enalapril upon discharge       Hypercholesterolemia  Assessment & Plan  · , HDl 31, triglyceride 526, total cholesterol 331   · Started on atorvastatin 40mg once daily       Morbid obesity with body mass index (BMI) of 40 0 to 44 9 in adult [facility])  Assessment & Plan  Encourage diet, exercise and weight loss    Type 2 diabetes mellitus with hyperglycemia, without long-term current use of insulin [facility])  Assessment & Plan    Lab Results   Component Value Date    HGBA1C 9 3 (H) 11/04/2019     · Uncontrolled DM2 - medical noncompliance due to patient losing health insurance, states did not take his diabetes medications for three months   · Will place patient on medications that are on [employer] $4 list for medication compliance   · Educated on importance of taking medications as prescribed   · Will discharge on glipizde 5mg once daily and metformin 1000 mg bid   · Will need follow up with PCP closely and recheck A1c in 3-4 months   · ADA diet  · Outpatient follow up with Ophthalmology and Podiatry     Noncompliance  Assessment & Plan  Noncompliant with medications and outpatient follow-up as patient lose helath insurance   Prescribed medications on [employer] $4 list and given GoodRx card   Educated importance of follow up with PCP         Discharging Physician / Practitioner: Sariah Pineda PA-C  PCP: No primary care provider on file  Admission Date:   Admission Orders (From admission, onward)     Ordered        11/04/19 0855  Inpatient Admission  Once         11/03/19 0615  Place in Observation (expected length of stay for this patient is less than two midnights)  Once                   Discharge Date: 11/05/19    Resolved Problems  Date Reviewed: 11/5/2019    None          Consultations During Hospital Stay:  · Cardiology     Procedures Performed:   · Cardiac Catheterization 11/4/19:   CORONARY VESSELS:   --  The coronary circulation is co-dominant  --  Left main: Angiography showed no evidence of disease  --  LAD: Angiography showed minor luminal irregularities  --  Mid circumflex: There was a 10 % stenosis  --  Distal circumflex: There was a diffuse 50 % stenosis  --  1st obtuse marginal: There was a 10 % stenosis at the site of a prior stent    -- Proximal RCA: There was a diffuse 60 % stenosis  --  Distal RCA: There was a 40 % stenosis      IMPRESSIONS:  Patent circumflex  Unchanged moderate nonobstructive distal circumflex and proximal RCA disease  Significant Findings / Test Results:   · CTA chest PE study: no CT evidence of PE   · CXR: no acute cardiopulmonary disease   · A1c 9 3%     Incidental Findings:   · none    Test Results Pending at Discharge (will require follow up):   · none     Outpatient Tests Requested:  · Follow up with Cardiology outpatient   · Follow up with PCP in 1 week for post hospital follow up     Complications:  Acute pericarditis, NSTEMI     Reason for Admission: chest pain     Hospital Course:     Mando Gaytan is a 27 y o  male patient who originally presented to the hospital on 11/3/2019 due to chest pain  Patient was admitted for chest pain ACS rule out with Cardiology consulted on admission with history of previous MI in 2014 s/p cardiac cath with JESUS MANUEL x 2  Patient has been medically noncompliant with all his medications due to losing his health insurance for around 3 months  He presented with chest pain and serial troponins were elevated with peak troponin 8 29  Patient was started on heparin drip and was taken to the cath lab for cardiac catheterization on 11/4/19 for NSTEMI  His cardiac cath was without obstructive disease  As patients chest pain was reproducible with elevated troponin, and elevated ESR/CRP his chest pain was suggestive of acute pericarditis  Per Cardiology he will continue aspirin TID x 10 days then will contineu 81 mg once daily thereafter  He will also continue colchicine for 30 days  He will need optimization of medical comorbidities and will continue toprol XL and enalapril for essential hypertension  He was restarted on statin and will continue atorvastatin 40mg once daily with dinner   He will also be discharged on glipizide and metformin for diabetes control due to cost and will need repeat A1c in 3-4 months with PCP outpatient  Patient will need follow up with Cardiology outpatient for continued monitoring  He was educated and understanding of importance of medical compliance and need for close follow up  Patients serial troponins were elevated with peak troponin     The patient, initially admitted to the hospital as inpatient, was discharged earlier than expected given the following: patient okay for discharge from cardiology standpoint with no further inpatietn testing   Please see above list of diagnoses and related plan for additional information  Condition at Discharge: stable     Discharge Day Visit / Exam:     Subjective:  Patient doing well today  No reoccurrence of chest pain  No other acute complaints  Talked at length about importance of medical compliance and getting medications filled at [facility] through $4 list and given [employer] card  Patient demonstrated understanding of importance of compliance  Vitals: Blood Pressure: 116/70 (11/05/19 1125)  Pulse: 77 (11/05/19 0706)  Temperature: 97 5 °F (36 4 °C) (11/05/19 1125)  Temp Source: Temporal (11/05/19 1125)  Respirations: 20 (11/05/19 1125)  Height: 5' 5" (165 1 cm) (11/04/19 1319)  Weight - Scale: 122 kg (269 lb 2 9 oz) (11/03/19 0508)  SpO2: 97 % (11/05/19 1125)  Exam:   Physical Exam   Constitutional: No distress  HENT:   Head: Normocephalic and atraumatic  Eyes: Conjunctivae are normal    Neck: Neck supple  Cardiovascular: Normal rate and regular rhythm  Pulmonary/Chest: Effort normal and breath sounds normal  He exhibits tenderness  Abdominal: Soft  Bowel sounds are normal    Musculoskeletal: He exhibits no edema  Neurological: He is alert  Skin: Skin is warm  He is not diaphoretic  Psychiatric: He has a normal mood and affect  Nursing note and vitals reviewed        Discussion with Family: patient using blue phone interpretation services     Discharge instructions/Information to patient and family: See after visit summary for information provided to patient and family  Provisions for Follow-Up Care:  See after visit summary for information related to follow-up care and any pertinent home health orders  Disposition:     Home    For Discharges to [facility] SNF:   · Not Applicable to this Patient - Not Applicable to this Patient    Planned Readmission: none     Discharge Statement:  I spent 55 minutes discharging the patient  This time was spent on the day of discharge  I had direct contact with the patient on the day of discharge  Greater than 50% of the total time was spent examining patient, answering all patient questions, arranging and discussing plan of care with patient as well as directly providing post-discharge instructions  Additional time then spent on discharge activities  Discharge Medications:  See after visit summary for reconciled discharge medications provided to patient and family        ** Please Note: This note has been constructed using a voice recognition system **

## 2019-11-05 NOTE — ASSESSMENT & PLAN NOTE
· Reports chest pain radiating to both arms associated with shortness of breath  · H/o MI 2014, S/p cardiac cath, 100% stenosis OM, 95% stenosis mid circumflex, s/p JESUS MANUEL x2, on DAPT/ASA and plavix  · Serial troponin continued to trend up to peak 8 29   · CTA chest negative for PE   · Appreciate Cardiology consult   · Underwent cardiac cath on 11/4/19: patent circumflex   unchanged moderate nonobstructive distal circumflex (diffuse 50% stenosis) and proximal RCA disease (diffuse 60% stenosis), 40% stenosis distal RCA   · With normal cardiac cath, chest pain with elevated troponin and elevated ESR/CRP concern for pericarditis, see plan as below   · Continue medical management with aspirin, statin  · Control of risk factors including HTN and DM2   · , HDL 31, triglycerides 526, total cholesterol 331   · Educated on importance of medical compliance

## 2019-11-05 NOTE — PROGRESS NOTES
Progress Note - Cardiology   [facility] KURTIS Leblanc 27 y o  male MRN: [id number]  Unit/Bed#: E4 -01 Encounter: [id number]          Asessment/Plan:  1  Probable myopericarditis   --Echocardiogram completed with review forthcoming later today  WIll discuss possible outpt MRI with attending   --Patient currently receiving t i d  Aspirin~~>continue same through 10/18 (81 mg daily thereafter)  Will add colchicine x30 days  2  Non MI troponin elevation likely secondary to the above  3  CAD:    --JESUS MANUEL - CFx & OM1 (2014)   --Status post cardiac catheterization 11/4/19 with nonobstructive disease of distal circumflex(diffuse 50%) and proximal and distal RCA (40-60%)   --Continue beta-blocker/statin/aspirin  Will discontinue Plavix at this point  4  Dyslipidemia:   --Current lipids =TChol 331, Trig 526,  HDL 31,  ~~>continue statin which was re-initiated during this hospitalization  Need for improved diet was also discussed  5  Medical noncompliance:  Patient on no pre-hospital meds without visit to a PCP in approximately 1 year  6  Diabetes mellitus  7   Obesity:  BMI more than 40      Subjective:  Currently comfortable with no chest pain nor new complaint    Vitals:  Vitals:    11/03/19 0508   Weight: 122 kg (269 lb 2 9 oz)   ,  Vitals:    11/04/19 2030 11/04/19 2200 11/04/19 2230 11/05/19 0706   BP: 105/58  111/68 113/67   BP Location: Left arm  Left arm Right arm   Pulse: 79 85 76 77   Resp: 20  18 20   Temp: 97 7 °F (36 5 °C)  98 1 °F (36 7 °C) (!) 97 4 °F (36 3 °C)   TempSrc: Temporal  Tympanic Temporal   SpO2: 96%  97% 94%   Weight:       Height:           Exam:  General:  Alert normally conversant and comfortable-appearing  Heart:  Regular with controlled rate and no pathologic murmur or rub  Lungs:  Clear throughout  Lower Limbs:  No edema    Medications:    Current Facility-Administered Medications:     acetaminophen (TYLENOL) tablet 650 mg, 650 mg, Oral, Q4H PRN, LAUREEN Dougherty, 650 mg at 11/03/19 1613    aluminum-magnesium hydroxide-simethicone (MYLANTA) 200-200-20 mg/5 mL oral suspension 30 mL, 30 mL, Oral, Q6H PRN, LAUREEN Dougherty    aspirin tablet 325 mg, 325 mg, Oral, TID, Chano Troncoso MD, 325 mg at 11/05/19 0920    atorvastatin (LIPITOR) tablet 40 mg, 40 mg, Oral, Daily With Dinner, LAUREEN Dougherty, 40 mg at 11/04/19 1631    clopidogrel (PLAVIX) tablet 75 mg, 75 mg, Oral, Daily, LAUREEN Dougherty, 75 mg at 11/05/19 8761    colchicine (COLCRYS) tablet 0 6 mg, 0 6 mg, Oral, BID, Rujul Cowan, DO, 0 6 mg at 11/05/19 0921    ibuprofen (MOTRIN) tablet 600 mg, 600 mg, Oral, TID, Rujul Cowan, DO, 600 mg at 11/05/19 0921    insulin glargine (LANTUS) subcutaneous injection 10 Units 0 1 mL, 10 Units, Subcutaneous, HS, Viola Lopez PA-C, 10 Units at 11/04/19 2118    insulin lispro (HumaLOG) 100 units/mL subcutaneous injection 2-12 Units, 2-12 Units, Subcutaneous, TID AC, 2 Units at 11/05/19 0922 **AND** Fingerstick Glucose (POCT), , , TID AC, Chano Troncoso MD    lisinopril (ZESTRIL) tablet 10 mg, 10 mg, Oral, Daily, LAUREEN Dougherty, 10 mg at 11/05/19 4450    metoprolol succinate (TOPROL-XL) 24 hr tablet 50 mg, 50 mg, Oral, Daily, LAUREEN Dougherty, 50 mg at 11/05/19 6910    morphine injection 1 mg, 1 mg, Intravenous, Q4H PRN, LAUREEN Dougherty    nitroglycerin (NITROSTAT) SL tablet 0 4 mg, 0 4 mg, Sublingual, Q5 Min PRN, LAUREEN Dougherty, 0 4 mg at 11/03/19 0800    ondansetron (ZOFRAN) injection 4 mg, 4 mg, Intravenous, Q6H PRN, LAUREEN Dougherty    pantoprazole (PROTONIX) EC tablet 40 mg, 40 mg, Oral, Early Morning, Anita Cowan DO, 40 mg at 11/05/19 0510    sodium chloride 0 9 % infusion, 100 mL/hr, Intravenous, Continuous, Clearance CHERELLE Silvestre, Last Rate: 100 mL/hr at 11/04/19 1600, 100 mL/hr at 11/04/19 1600      Labs/Data:        Results from last 7 days   Lab Units 11/05/19  0519 11/04/19  0328 11/03/19  1258   WBC Thousand/uL 6 59 8 36 11 04*   HEMOGLOBIN g/dL 13 7 14 7 14 3   HEMATOCRIT % 42 0 44 2 42 5   PLATELETS Thousands/uL 238 258 271     Results from last 7 days   Lab Units 11/05/19  0519 11/04/19  0328 11/03/19  1850 11/03/19  1457  11/03/19  0513   POTASSIUM mmol/L 4 1 3 6  --   --   --  3 6   CHLORIDE mmol/L 104 91*  --   --   --  97*   CO2 mmol/L 24 25  --   --   --  23   BUN mg/dL 14 12  --   --   --  10   TROPONIN I ng/mL  --  8 29* 6 72* 2 38*   < > <0 02    < > = values in this interval not displayed

## 2019-11-05 NOTE — PLAN OF CARE
Problem: Potential for Falls  Goal: Patient will remain free of falls  Description  INTERVENTIONS:  - Assess patient frequently for physical needs  -  Identify cognitive and physical deficits and behaviors that affect risk of falls    -  [place] fall precautions as indicated by assessment   - Educate patient/family on patient safety including physical limitations  - Instruct patient to call for assistance with activity based on assessment  - Modify environment to reduce risk of injury  - Consider OT/PT consult to assist with strengthening/mobility  Outcome: Progressing     Problem: CARDIOVASCULAR - ADULT  Goal: Maintains optimal cardiac output and hemodynamic stability  Description  INTERVENTIONS:  - Monitor I/O, vital signs and rhythm  - Monitor for S/S and trends of decreased cardiac output  - Administer and titrate ordered vasoactive medications to optimize hemodynamic stability  - Assess quality of pulses, skin color and temperature  - Assess for signs of decreased coronary artery perfusion  - Instruct patient to report change in severity of symptoms  Outcome: Progressing  Goal: Absence of cardiac dysrhythmias or at baseline rhythm  Description  INTERVENTIONS:  - Continuous cardiac monitoring, vital signs, obtain 12 lead EKG if ordered  - Administer antiarrhythmic and heart rate control medications as ordered  - Monitor electrolytes and administer replacement therapy as ordered  Outcome: Progressing     Problem: METABOLIC, FLUID AND ELECTROLYTES - ADULT  Goal: Glucose maintained within target range  Description  INTERVENTIONS:  - Monitor Blood Glucose as ordered  - Assess for signs and symptoms of hyperglycemia and hypoglycemia  - Administer ordered medications to maintain glucose within target range  - Assess nutritional intake and initiate nutrition service referral as needed  Outcome: Progressing     Problem: PAIN - ADULT  Goal: Verbalizes/displays adequate comfort level or baseline comfort level  Description  Interventions:  - Encourage patient to monitor pain and request assistance  - Assess pain using appropriate pain scale  - Administer analgesics based on type and severity of pain and evaluate response  - Implement non-pharmacological measures as appropriate and evaluate response  - Consider cultural and social influences on pain and pain management  - Notify physician/advanced practitioner if interventions unsuccessful or patient reports new pain  Outcome: Progressing     Problem: INFECTION - ADULT  Goal: Absence or prevention of progression during hospitalization  Description  INTERVENTIONS:  - Assess and monitor for signs and symptoms of infection  - Monitor lab/diagnostic results  - Monitor all insertion sites, i e  indwelling lines, tubes, and drains  - Monitor endotracheal if appropriate and nasal secretions for changes in amount and color  - [place] appropriate cooling/warming therapies per order  - Administer medications as ordered  - Instruct and encourage patient and family to use good hand hygiene technique  - Identify and instruct in appropriate isolation precautions for identified infection/condition  Outcome: Progressing     Problem: SAFETY ADULT  Goal: Maintain or return to baseline ADL function  Description  INTERVENTIONS:  -  Assess patient's ability to carry out ADLs; assess patient's baseline for ADL function and identify physical deficits which impact ability to perform ADLs (bathing, care of mouth/teeth, toileting, grooming, dressing, etc )  - Assess/evaluate cause of self-care deficits   - Assess range of motion  - Assess patient's mobility; develop plan if impaired  - Assess patient's need for assistive devices and provide as appropriate  - Encourage maximum independence but intervene and supervise when necessary  - Involve family in performance of ADLs  - Assess for home care needs following discharge   - Consider OT consult to assist with ADL evaluation and planning for discharge  - Provide patient education as appropriate  Outcome: Progressing  Goal: Maintain or return mobility status to optimal level  Description  INTERVENTIONS:  - Assess patient's baseline mobility status (ambulation, transfers, stairs, etc )    - Identify cognitive and physical deficits and behaviors that affect mobility  - Identify mobility aids required to assist with transfers and/or ambulation (gait belt, sit-to-stand, lift, walker, cane, etc )  - [place] fall precautions as indicated by assessment  - Record patient progress and toleration of activity level on Mobility SBAR; progress patient to next Phase/Stage  - Instruct patient to call for assistance with activity based on assessment  - Consider rehabilitation consult to assist with strengthening/weightbearing, etc   Outcome: Progressing     Problem: DISCHARGE PLANNING  Goal: Discharge to home or other facility with appropriate resources  Description  INTERVENTIONS:  - Identify barriers to discharge w/patient and caregiver  - Arrange for needed discharge resources and transportation as appropriate  - Identify discharge learning needs (meds, wound care, etc )  - Arrange for interpretive services to assist at discharge as needed  - Refer to Case Management Department for coordinating discharge planning if the patient needs post-hospital services based on physician/advanced practitioner order or complex needs related to functional status, cognitive ability, or social support system  Outcome: Progressing     Problem: Knowledge Deficit  Goal: Patient/family/caregiver demonstrates understanding of disease process, treatment plan, medications, and discharge instructions  Description  Complete learning assessment and assess knowledge base    Interventions:  - Provide teaching at level of understanding  - Provide teaching via preferred learning methods  Outcome: Progressing     Problem: Nutrition/Hydration-ADULT  Goal: Nutrient/Hydration intake appropriate for improving, restoring or maintaining nutritional needs  Description  Monitor and assess patient's nutrition/hydration status for malnutrition  Collaborate with interdisciplinary team and initiate plan and interventions as ordered  Monitor patient's weight and dietary intake as ordered or per policy  Utilize nutrition screening tool and intervene as necessary  Determine patient's food preferences and provide heart healthy, DM diet    INTERVENTIONS:  - Monitor oral intake, urinary output, labs, and treatment plans  - Assess nutrition and hydration status and recommend course of action  - Evaluate amount of meals eaten  - Assist patient with eating if necessary   - Allow adequate time for meals  - Recommend/ encourage appropriate diets, oral nutritional supplements, and vitamin/mineral supplements  - Order, calculate, and assess calorie counts as needed  - Recommend, monitor, and adjust tube feedings and TPN/PPN based on assessed needs  - Assess need for intravenous fluids  - Provide specific nutrition/hydration education as appropriate  - Include patient/family/caregiver in decisions related to nutrition   Outcome: Progressing

## 2019-11-05 NOTE — ASSESSMENT & PLAN NOTE
Lab Results   Component Value Date    HGBA1C 9 3 (H) 11/04/2019     · Uncontrolled DM2 - medical noncompliance due to patient losing health insurance, states did not take his diabetes medications for three months   · Will place patient on medications that are on [place] $4 list for medication compliance   · Educated on importance of taking medications as prescribed   · Will discharge on glipizde 5mg once daily and metformin 1000 mg bid   · Will need follow up with PCP closely and recheck A1c in 3-4 months   · ADA diet  · Outpatient follow up with Ophthalmology and Podiatry

## 2019-11-05 NOTE — ASSESSMENT & PLAN NOTE
Normal cardiac cath 11/4/19   With cath without obstructive disease, elevated ESR/CRP and elevated troponin suggestive of acute pericarditis   Will continue aspirin TID dosing for 10 days then continue 81 mg once daily thereafter   Will also continue colchicine for 30 days   Continue protonix for gastric protection   Follow up with Cardiology outpatient

## 2020-06-11 NOTE — UTILIZATION REVIEW
URGENT/EMERGENT  INPATIENT/SPU AUTHORIZATION REQUEST    Date: 06/11/20            # Pages in this Request:     x New Request   Additional Information for PA#:     Office Contact Name:  Will Jama Title: Utilization Review, Registed Nurse     Phone: [phone]  Ext  Availability (Date/Time): Wednesday - Friday 8 am- 4 pm    x Inpatient Review  SPU Review        Current       x Late Pick-up   · How your facility was first notified of the Late Pick-up: PATHS  · When your facility was first notified of the Late Pick-up (date): 6/1         RECIPIENT INFORMATION    Recipient #:[id number]  Recipient Name: Daisy Choudhury    YOB: 1985  38 y o  Recipient Alias:     Gender:  X Male  Female Medicaid Eligibility ([address] [place] [address]): INSURANCE INFORMATION    (All other private or governmental health insurance benefits must be utilized prior to billing the [place] Program)    Was this admission the result of an MVA, other accident, assault, injury, fall, gunshot, bite etc ? Yes X No                   If yes, provide a brief description of the incident  Does the recipient have other insurance coverage? Yes X No        Insurance Company Name/Policy #      Did that insurance pay on this claim? Yes  No        Did that insurance deny this claim? Yes  No    If yes, reason for denial:      Does the recipient have Medicare? Yes X No        Did Medicare exhaust prior to this admission? Yes  No            Did Medicare partially pay this claim? Yes  No        Did that insurance deny this claim? Yes  No    If yes, reason for denial:          Was the recipient a prisoner at the time of admission?    Yes X No            PROVIDER INFORMATION    Hospital Name: [facility] Provider ID#: [id number]    [address] [place] [address] Physician Name: [facility]  [facility] Provider ID#: [id number]        ADMISSION INFORMATION    Type of Admission: (please choose one)    X ED      Direct    If yes, from where? Transfer    If yes, transferring hospital (inpatient, rehab, or psych) Provider Name/Provider ID#: Admission Floor or Unit Type: MED-SURG    Dates/Times:        ED Date/Time: 11/3/2019  04:58        Observation Date/Time:11/3/2019  0615        Admission Date/Time: 11/4/19 0855        Discharge or Transfer Date/Time: 11/5/2019  1223        DIAGNOSIS/PROCEDURE CODES    Primary Diagnosis Code/Primary Diagnosis Code description:   Hyperglycemia due to type 2 diabetes mellitus ([facility] [address] ) [E11 65]  Chest pain [R07 9]  ESSENTIAL HYPERTENSION I10  ACUTE PERICARDITIS I30 9  (MAY RE-ORDER DX CODES)    Additional Diagnosis Code(s) and Description(s)-(up to three additional codes):     Procedure Code (one) and description: [id number]  FLOUROSCOPY OF MULT COR ART USING L OSM CONTRAST        CLINICAL INFORMATION - PRIOR ADMISSION ONLY    Is there a prior admission with a discharge date within 30 days of the date of this admission? X No (Proceed to the next section - "Clinical Information - General Review Checklist:)      Yes (Provide the following information)     Prior admission dates:    MA Prior Authorization Number:        Review Outcome:     Diagnosis Code(s)/Description:    Procedure Code/Description:    Findings:    Treatment:    Condition on Discharge:   Vitals:    Labs:   Imaging:   Medications: Follow-up Instructions:    Disposition:        CLINICAL INFORMATION - GENERAL REVIEW CHECKLIST    EMERGENCY DEPARTMENT: (Proceed to "ADMISSION" if Direct Admission)    Presenting Signs/Symptoms:    Chest Pain       Pt reports "chest pain for past 30 minutes" also admits to ETOH      Assessment/Plan:   a 27year old male with a PMH of MI x 2014, hypertension, hyperlipidemia, type II DM, morbid obesity presenting with substernal chest pain x 30 minutes  Associated SOB  Pt is diaphoretic and in distress  Took 2 Nitro SL PTA without relief  No history of DVT, leg swelling, hemoptysis   Denies lightheadedness, dizziness, n/v  Complaint of ongoing/constant severe chest pain which was reproducible with palpation and deep inspiration   He does state that both of his arms hurt as well without any radiation to the jaw   He admits to some"shortness of breath, denies palpitations, dizziness, lower extremity edema, orthopnea  Chest pain noted @ 1611 placed on heparin drip  Medication/treatment prior to arrival in the ED:     Past Medical History:    Active Ambulatory Problems     Diagnosis Date Noted    Hypercholesterolemia 11/26/2018    Essential hypertension 11/26/2018    Type 2 diabetes mellitus with hyperglycemia, without long-term current use of insulin ([facility] [address] ) 11/26/2018    Morbid obesity with body mass index (BMI) of 40 0 to 44 9 in adult [facility]) 11/26/2018       Past Medical History:   Diagnosis Date    Cardiac disease        Clinical Exam:     Initial Vital Signs: (Temp, Pulse, Resp, and BP)   ED Triage Vitals   Temperature Pulse Respirations Blood Pressure SpO2   11/03/19 0508 11/03/19 0508 11/03/19 0508 11/03/19 0508 11/03/19 0508   98 2 °F (36 8 °C) 105 20 132/81 99 %      Temp Source Heart Rate Source Patient Position - Orthostatic VS BP Location FiO2 (%)   11/03/19 0508 11/03/19 0545 11/03/19 0700 11/03/19 0700 --   Oral Monitor Lying Right arm       Pain Score       11/03/19 0508       Worst Possible Pain           Pertinent Repeat Vital Signs: (include times they were obtained)  1/03/19 0700   96 5 °F (35 8 °C)Abnormal    98   24Abnormal    159/88   96 %   Nasal cannula       Pertinent Sustained Findings: (include times they were obtained)    Weight in Kilograms:  Wt Readings from Last 1 Encounters:   11/03/19 122 kg (269 lb 2 9 oz)       Pertinent Labs (results):  Lab Units 11/03/19  0513   WBC Thousand/uL 8 60   HEMOGLOBIN g/dL 15 9   HEMATOCRIT % 46 8   PLATELETS Thousands/uL 311   NEUTROS ABS Thousands/µL 5 71               Results from last 7 days   Lab Units 11/03/19  0513   SODIUM mmol/L 134* POTASSIUM mmol/L 3 6   CHLORIDE mmol/L 97*   CO2 mmol/L 23   ANION GAP mmol/L 14*   BUN mg/dL 10   CREATININE mg/dL 0 88   EGFR ml/min/1 73sq m 111   CALCIUM mg/dL 9 3           Results from last 7 days   Lab Units 11/03/19  0513   AST U/L 28   ALT U/L 32   ALK PHOS U/L 113   TOTAL PROTEIN g/dL 8 6*   ALBUMIN g/dL 3 7   TOTAL BILIRUBIN mg/dL 0 41           Results from last 7 days   Lab Units 11/03/19  0827   POC GLUCOSE mg/dl 311*           Results from last 7 days   Lab Units 11/03/19  0513   GLUCOSE RANDOM mg/dL 336*           Results from last 7 days   Lab Units 11/03/19  0551   PH PAULA   7 388   PCO2 PAULA mm Hg 34 3*   PO2 PAULA mm Hg 42 1   HCO3 PAULA mmol/L 20 2*   BASE EXC PAULA mmol/L -3 9   O2 CONTENT PAULA ml/dL 15 0   O2 HGB, VENOUS % 74 1            Results from last 7 days   Lab Units 11/03/19  0833 11/03/19  0513   TROPONIN I ng/mL 0 08* <0 02         Radiology (results):    EKG (results):      Other tests (results):    Tests pending final results:    Treatment in the ED:   Medication Administration from 11/03/2019 0458 to 11/03/2019 7822       Date/Time Order Dose Route Action Action by Comments     11/03/2019 0514 nitroglycerin (NITRO-BID) 2 % TD ointment 1 inch 1 inch Topical Given       11/03/2019 0520 morphine (PF) 4 mg/mL injection 6 mg 6 mg Intravenous Given       11/03/2019 0515 aspirin chewable tablet 243 mg 243 mg Oral Given       11/03/2019 0523 aspirin chewable tablet 81 mg 81 mg Oral Given             Meds: Name, dose, route, time, number of doses given        Nebulizer treatments: Type, total number given      IVs: Type, rate, total amt  given          Other treatments:       Change in condition while in the ED:       Response to ED Treatment:           OBSERVATION: (Proceed to "ADMISSION" if Direct Admission)    Orders written during the observation period  IP CONSULT TO CARDIOLOGY  IP CONSULT TO CASE MANAGEMENT   Telemetry  scd  ECHO  TELEMETRY    Meds Name, dose, route, time, how may doses given:  aspirin 81 mg Oral Daily   atorvastatin 40 mg Oral Daily With Dinner   clopidogrel 75 mg Oral Daily   influenza vaccine 0 5 mL Intramuscular Once   insulin lispro 2-12 Units Subcutaneous Q6H [facility]   lisinopril 10 mg Oral Daily   metoprolol succinate 50 mg Oral Daily        PRN Meds Name, dose, route, time, how many doses given within the first 24 hrs :  acetaminophen 650 mg Oral Q4H PRN   aluminum-magnesium hydroxide-simethicone 30 mL Oral Q6H PRN   morphine injection 1 mg Intravenous Q4H PRN   nitroglycerin 0 4 mg Sublingual Q5 Min PRN   ondansetron 4 mg Intravenous Q6H PRN       IVs Type, rate, and total amt   ordered/given:  Labs, imaging, other:  Consults and findings:    * Chest pain  Assessment & Plan  Reports chest pain radiating to both arms associated with shortness of breath  Troponin neg, trend x3  EKG: ST abnormalities; serial EKGs  NICK SCORE 3  S/p ASA bolus, morphine and NTG; no improvement in chest pain  Check lipid panel and A1c  Monitor on telemetry  BNP ordered  Cardiology consult      · H/o MI 2014, S/p cardiac cath, 100% stenosis OM, 95% stenosis mid circumflex, s/p JESUS MANUEL x2, on DAPT/ASA and plavix     Type 2 diabetes mellitus with hyperglycemia, without long-term current use of insulin ([facility])  Assessment & Plan           Lab Results   Component Value Date     HGBA1C 8 3 (H) 11/26/2018      · Uncontrolled DM,   · A1c ordered   · Hold Janumet  · Start Accu-Cheks and sliding scale  · ADA diet     Noncompliance  Assessment & Plan  Noncompliant with medications and outpatient follow-up; patient states he lost his health insurance  Case management consult for assistance with medications     Morbid obesity with body mass index (BMI) of 40 0 to 44 9 in adult [facility])  Assessment & Plan  Encourage diet, exercise and weight loss     Essential hypertension  Assessment & Plan  Restart Toprol and enalapril     Hypercholesterolemia  Assessment & Plan  Lipid panel ordered, will start atorvastatin 40 mg daily       Anticipated Length of Stay:  Patient will be admitted on an Observation basis with an anticipated length of stay of  < 2 midnights  Justification for Hospital Stay: CP       This is a 50-year-old morbidly obese male patient with past medical history of coronary artery disease status post MI with cardiac catheterization in 2014-100% stenosis OM, 95% stenosis mid circumflex, s/p drug-eluting stent x2 and now currently on DAPT with aspirin and Plavix, type 2 diabetes mellitus, history of noncompliance, hypertension, and hyperlipidemia who presented with the onset of chest pain radiating to both arms and associated with dyspnea on exertion and shortness of breath  Initial EKG showed normal sinus rhythm with sinus tachycardia  Initial troponin was negative  Patient received full-dose aspirin bolus 324 mg PO x1, morphine and nitroglycerin with no improvement in chest pain  Portable chest x-ray done but official read pending  Patient was admitted to medical-surgical unit to rule out ACS with serial troponins  However patient has been having ongoing and continuing chest pain that is not relieved with multiple doses of IV morphine  On exam at bedside, patient appears uncomfortable and restless  Heart sounds showed normal S1 and S2, sinus tachycardia, no irregular rhythm, no murmur  Lungs are clear to auscultation bilaterally  There is no lower extremity edema  Afterwards patient's subsequent troponin result came back as 0 08 and increased to--> 0 38  D-dimer obtained by cardiologist was elevated at 0 50, and CTA of chest PE study was negative for PE   ESR 17, CRP pending  Patient is ruling in for ACS/NSTEMI  Administered loading dose of Plavix and started patient on IV heparin drip  Keep patient on telemetry  Continue to trend troponins  Cardiology attending notified of new developments  Continue morphine 1 mg IV q 4 hours prn chest pain as well as sublingual nitroglycerin    Continue Lipitor 40 mg daily, aspirin, lisinopril, Toprol-XL  Patient would likely need cardiac catheterization this admission  CARDIOLOGY CONSULT:  IMPRESSION/RECOMMENDATIONS/DISCUSSION:  3  Severe chest pain:  Clearly reproducible with inspiration, and palpation  May be musculoskeletal in etiology  Initial troponin level within normal limits with 2nd level being drawn at this time  No ischemic ECG changes  Will trend troponin levels x3  If unremarkable, with suspect possible musculoskeletal etiology  Will check D-dimer, if elevated, CTA chest be recommended, as there seems to be some reproduction with deep inspiration a does have some dyspnea  Will schedule echocardiogram for tomorrow  Will increase dose of aspirin to 325 mg p o  T i d   Agree with atorvastatin  Continue clopidogrel, lisinopril, metoprolol     Will check sedimentation rate and CRP     2  CAD, NSTEMI in 2014 status post PCI/JESUS MANUEL x2 to the circumflex and OM1:  Continue aspirin, atorvastatin, clopidogrel, lisinopril, metoprolol  Heart healthy diet, weight loss is strongly recommended     3  Hypertension:  Continue lisinopril and metoprolol  May titrate up if needed     4  Dyslipidemia:  Continue atorvastatin, lipid panel pending       Progress Note - Cardiology   Jacey Szymanskia KURTIS Leblanc 27 y o  male MRN: [id number]  Unit/Bed#: E4 -01 Encounter: [id number]              Asessment/Plan:  1  Probable myopericarditis        --Echocardiogram completed with review forthcoming later today  WIll discuss possible outpt MRI with attending        --Patient currently receiving t i d  Aspirin~~>continue same through 10/18 (81 mg daily thereafter)  Will add colchicine x30 days  2  Non MI troponin elevation likely secondary to the above  3   CAD:               --JESUS MANUEL - CFx & OM1 (2014)              --Status post cardiac catheterization 11/4/19 with nonobstructive disease of distal circumflex(diffuse 50%) and proximal and distal RCA (40-60%)              --Continue beta-blocker/statin/aspirin  Will discontinue Plavix at this point  4  Dyslipidemia:              --Current lipids =TChol 331, Trig 526,  HDL 31,  ~~>continue statin which was re-initiated during this hospitalization  Need for improved diet was also discussed  5  Medical noncompliance:  Patient on no pre-hospital meds without visit to a PCP in approximately 1 year  6  Diabetes mellitus  7  Obesity:  BMI more than 40      Test Results during the observation period  Pertinent Lab tests (dates/results):Results for Josue Gonzalez (MRN [id number]) as of 6/11/2020 08:50   Ref   Range 11/3/2019 11:21 11/3/2019 11:50 11/3/2019 12:57 11/3/2019 12:58 11/3/2019 14:57 11/3/2019 16:18 11/3/2019 18:50 11/3/2019 19:54 11/3/2019 20:29 11/4/2019 03:28 11/4/2019 03:33   POC Glucose Latest Ref Range: 65 - 140 mg/dl  243 (H)    223 (H)  198 (H)      Sodium Latest Ref Range: 136 - 145 mmol/L          133 (L)    Potassium Latest Ref Range: 3 5 - 5 3 mmol/L          3 6    Chloride Latest Ref Range: 100 - 108 mmol/L          91 (L)    CO2 Latest Ref Range: 21 - 32 mmol/L          25    Anion Gap Latest Ref Range: 4 - 13 mmol/L          17 (H)    BUN Latest Ref Range: 5 - 25 mg/dL          12    Creatinine Latest Ref Range: 0 60 - 1 30 mg/dL          0 81    Glucose, Random Latest Ref Range: 65 - 140 mg/dL          260 (H)    Calcium Latest Ref Range: 8 3 - 10 1 mg/dL          9 0    eGFR Latest Units: ml/min/1 73sq m          115    Troponin I Latest Ref Range: <=0 04 ng/mL 0 38 (H)    2 38 (H)  6 72 (H)   8 29 (H)    Cholesterol Latest Ref Range: 50 - 200 mg/dL          331 (H)    Triglycerides Latest Ref Range: <=150 mg/dL          526 (H)    HDL Latest Ref Range: >=40 mg/dL          31 (L)    LDL Calculated Unknown          See Comment    LDL CHOLESTEROL DIRECT Latest Ref Range: 0 - 100 mg/dl          197 (H)    WBC Latest Ref Range: 4 31 - 10 16 Thousand/uL    11 04 (H)      8 36    Red Blood Cell Count Latest Ref Range: 3 88 - 5 62 Million/uL    4 71      4 80    Hemoglobin Latest Ref Range: 12 0 - 17 0 g/dL    14 3      14 7    HCT Latest Ref Range: 36 5 - 49 3 %    42 5      44 2    MCV Latest Ref Range: 82 - 98 fL    90      92    MCH Latest Ref Range: 26 8 - 34 3 pg    30 4      30 6    MCHC Latest Ref Range: 31 4 - 37 4 g/dL    33 6      33 3    RDW Latest Ref Range: 11 6 - 15 1 %    12 7      12 8    Platelet Count Latest Ref Range: 149 - 390 Thousands/uL    271      258    MPV Latest Ref Range: 8 9 - 12 7 fL    10 4      10 7    nRBC Latest Units: /100 WBCs          0      Culture results (blood, urine, spinal, wound, respiratory, etc ):  Imaging tests (dates/results):  EKG (dates/results): Other test (dates/results):  Tests pending (dates/results):    Surgical or Invasive Procedures during the observation period  Name of surgery/procedure:Right radial catheterization     Findings:     Left main coronary artery:  Nonobstructive     Lad:  Mild luminal irregularities     Left circumflex:  Patent mid and obtuse marginal stent  Distal vessel remains diffuse moderately atherosclerotic with 50-60% disease as evidenced 2014     Right coronary artery:  Diffuse 40-50% disease in the proximal segment  40% distal disease        Impression:  No definitive atherosclerotic plaque rupture to explain patient's elevated troponin  Overall, angiography appears mostly 2000     Plan:  patient needs to comply with antiplatelets, high potency statin,  weight loss and sensible/Mediterranean type diet        Response to Treatment, Major Change in Condition, Major Charge in Treatment during the observation period          ADMISSION:    DIRECT Admissions Only:    · Presenting Signs/Symptoms:   ·   · Medication/treatment prior to arrival:  ·   · Past Medical History:  ·   · Clinical Exam on admission:  ·   · Vital Signs on admission: (Temp, Pulse, Resp, and BP)  ·   · Weight in kilograms:     ALL Admissions:    Admission Orders and Other Orders written within the first 24 hrs after admission  Assessment/Plan:     Pt  Initially admitted with chest pain  With H/O  CAD, NSTEMI S/P  PCI/JESUS MANUEL X 2 to the circumflex  And  OM1  Now with excalating troponin to 8  29  Cont   Ip  LOC  For  NSTEMI  And  req  Cardiac cath and  IV heparin  Cont  BB and ace inhibitor      IP ORDER  ENTERED   11/4  @   5613  The patient will continue to require additional inpatient hospital stay due to NSTEMI     /04/19 0856  Inpatient Admission Once     Transfer Service: General Medicine       Question Answer Comment   Admitting Physician Mathew Crigler     Level of Care Med Surg     Estimated length of stay More than 2 Midnights     Certification I certify that inpatient services are medically necessary for this patient for a duration of greater than two midnights  See H&P and MD Progress Notes for additional information about the patient's course of treatment          11/04/19 0855       Meds Name, dose, route, time, how may doses given:  aspirin 325 mg Oral TID   atorvastatin 40 mg Oral Daily With Dinner   clopidogrel 75 mg Oral Daily   insulin glargine 10 Units Subcutaneous HS   insulin lispro 2-12 Units Subcutaneous TID AC   lisinopril 10 mg Oral Daily   metoprolol succinate 50 mg Oral Daily      PRN Meds Name, dose, route, time, how many doses given within the first 24 hrs :  acetaminophen 650 mg Oral Q4H PRN   aluminum-magnesium hydroxide-simethicone 30 mL Oral Q6H PRN   heparin (porcine) 2,000 Units Intravenous PRN   heparin (porcine) 4,000 Units Intravenous PRN   morphine injection 1 mg Intravenous Q4H PRN   nitroglycerin 0 4 mg Sublingual Q5 Min PRN   ondansetron 4 mg Intravenous Q6H PRN       IVs Type, rate, and total amt   ordered/given:  heparin (porcine) 3-20 Units/kg/hr (Order-Specific) Intravenous Titrated       Labs, imaging, other:  Lab Units 11/04/19  0328 11/03/19  1258 11/03/19  0513   WBC Thousand/uL 8 36 11 04* 8 60   HEMOGLOBIN g/dL 14 7 14 3 15 9   HEMATOCRIT % 44 2 42 5 46 8   PLATELETS Thousands/uL 258 271 311   NEUTROS ABS Thousands/µL 5 79  --  5 71                Results from last 7 days   Lab Units 11/04/19  0328 11/03/19  0513   SODIUM mmol/L 133* 134*   POTASSIUM mmol/L 3 6 3 6   CHLORIDE mmol/L 91* 97*   CO2 mmol/L 25 23   ANION GAP mmol/L 17* 14*   BUN mg/dL 12 10   CREATININE mg/dL 0 81 0 88   EGFR ml/min/1 73sq m 115 111   CALCIUM mg/dL 9 0 9 3      Results from last 7 days   Lab Units 11/03/19  0513   AST U/L 28   ALT U/L 32   ALK PHOS U/L 113   TOTAL PROTEIN g/dL 8 6*   ALBUMIN g/dL 3 7   TOTAL BILIRUBIN mg/dL 0 41                  Results from last 7 days   Lab Units 11/04/19  1248 11/04/19  1143 11/04/19  0827 11/04/19  0726 11/03/19  1954 11/03/19  1618 11/03/19  1150 11/03/19  0827   POC GLUCOSE mg/dl 204* 190* 257* 256* 198* 223* 243* 311*            Results from last 7 days   Lab Units 11/04/19  0328 11/03/19  0513   GLUCOSE RANDOM mg/dL 260* 336*               Results from last 7 days   Lab Units 11/04/19  0328   HEMOGLOBIN A1C % 9 3*   EAG mg/dl 220                  Results from last 7 days   Lab Units 11/03/19  0551   PH PAULA   7 388   PCO2 PAULA mm Hg 34 3*   PO2 PAULA mm Hg 42 1   HCO3 PAULA mmol/L 20 2*   BASE EXC PAULA mmol/L -3 9   O2 CONTENT PAULA ml/dL 15 0   O2 HGB, VENOUS % 74 1                       Results from last 7 days   Lab Units 11/04/19  0328 11/03/19  1850 11/03/19  1457 11/03/19  1121 11/03/19  0833   TROPONIN I ng/mL 8 29* 6 72* 2 38* 0 38* 0 08*           Results from last 7 days   Lab Units 11/03/19  1121   D-DIMER QUANTITATIVE ug/ml FEU 0 50*              Results from last 7 days   Lab Units 11/04/19  1027 11/04/19  0333 11/03/19  2029 11/03/19  1257   PROTIME seconds  --   --   --  13 5   INR    --   --   --  1 02   PTT seconds 56* 41* 28 25                     Results from last 7 days   Lab Units 11/03/19  0513   NT-PRO BNP pg/mL 18                        Results from last 7 days   Lab Units 11/03/19  1120 11/03/19  0513   CRP mg/L  -- 16 3*   SED RATE mm/hour 17*  --         EKG  ST  No  ST elevation     98 4 °F (36 9 °C)   88   18   107/74   97 %   None (Room air)   Lying       11/04/19 0901            115/65            11/04/19 0700   97 8 °F (36 6 °C)   86   18   112/78   97 %   None (Room air)   Lying       Consults and findings:  * NSTEMI (non-ST elevated myocardial infarction) ([facility])  Assessment & Plan  · Reports chest pain radiating to both arms associated with shortness of breath  ?  H/o MI 2014, S/p cardiac cath, 100% stenosis OM, 95% stenosis mid circumflex, s/p JESUS MANUEL x2, on DAPT/ASA and plavix  · Serial troponin continued to trend up to peak 8 29   · CTA chest negative for PE   · Appreciate Cardiology consult   · Continue heparin drip   · Aspirin, plavix,statin   · Nitroglycerin and IV morphine prn   · , HDL 31, triglycerides 526, total cholesterol 331   · Monitor on telemetry  · NPO for cardiac cath today         Essential hypertension  Assessment & Plan  · Restarted Toprol and lisinopril substituted for enalapril      Hypercholesterolemia  Assessment & Plan  · , HDl 31, triglyceride 526, total cholesterol 331   · Started on atorvastatin 40mg once daily         Morbid obesity with body mass index (BMI) of 40 0 to 44 9 in adult [facility])  Assessment & Plan  Encourage diet, exercise and weight loss     Type 2 diabetes mellitus with hyperglycemia, without long-term current use of insulin ([facility])  Assessment & Plan           Lab Results   Component Value Date     HGBA1C 8 3 (H) 11/26/2018      · Uncontrolled DM2  · A1c ordered and pending   · Hold Janumet  · Accu-Cheks and sliding scale  · Will add lantus 10 units qHS   · Continue to monitor and adjust regimen as needed   · ADA diet     Noncompliance  Assessment & Plan  Noncompliant with medications and outpatient follow-up; patient states he lost his health insurance  Case management consult for assistance with medications       Certification Statement: The patient will continue to require additional inpatient hospital stay due to NSTEMI  Progress Note - Cardiology   Coy Leblanc 27 y o  male MRN: [id number]  Unit/Bed#: E4 -01 Encounter: [id number]                 Asessment/Plan:  1  Non STEMI:  concern for probable type 1  Escalating troponin to a current value of 8 29  2  CAD, non-STEMI 2014 w/ JESUS MANUEL - CFx & OM1  3  Uncontrolled dyslipidemia:  Current lipids =TChol 331, Trig 526,  HDL 31,    4  Hypertension  5  Medical noncompliance:  Patient is on no pre-hospital medications and has not been to a physician in approximately 1 year  6  Diabetes mellitus:  On no pre-hospital Rx with patient reporting pre diabetes managed with diet  7  Obesity:  BMI greater than 40     · Echocardiogram forthcoming  · Catheterization advised an acceptable to the patient -- will proceed today  · Yesterday patient was initiated on t i d  Aspirin with concern for possible pericarditis  Will await results of catheterization addressing need for change thereafter  · Patient received Plavix load yesterday and is receiving daily dosing today  · Continue IV heparin while awaiting results of catheterization  · Continue beta-blocker and ACE-inhibitor  · For now we will increase atorvastatin to 80 mg (on no pre-hospital Rx)     Test Results after admission  Pertinent Lab tests (dates/results):  Culture results (blood, urine, spinal, wound, respiratory, etc ):  Imaging tests (dates/results):  EKG (dates/results):   Other test (dates/results):  Tests pending (dates/results):    Surgical or Invasive Procedures  Name of surgery/procedure:  Date & Time:  Patient Response:  Post-operative orders:  Operative Report/Findings:    Response to Treatment, Major Change in Condition, Major Charge in Treatment anytime during admission    Disposition on Discharge  Home, Rehab, SNF, LTC, Shelter, etc : Home/Self Care    Cease to Breathe (CTB)  If a patient expires during an admission, in addition to the above information, please include:    Summary/timeline of the patient's decline in condition:    Medications and treatment:    Patient response to treatment:    Date and time patient ceased to breathe:        Is there a Readmission that follows this admission? Yes X No    If yes, reason for denial:          InterQual Review    InterQual Criteria Met:  Yes X No  N/A        Please include the InterQual Review, InterQual year/version used, and the criteria selected:     Patient: Tammie HULL  Review Number: [id number]  Review Status: In Primary  Criteria Status: Not Met     Condition Specific: Yes        OUTCOMES  Outcome Type: Primary           REVIEW DETAILS     Product: Fede Lime Adult  Subset: Acute Coronary Syndrome (ACS)      (Symptom or finding within 24h)         (Excludes PO medications unless noted)          Select Day, One:              [ ] Episode Day 2, One:                  [ ] INTERMEDIATE, One:                      [ ] Positive diagnostic testing post Observation level of care and, All:                          [X] Chest pain or anginal equivalent                          [X] Treatment, All:                              [X] Beta blocker (includes PO) administered or contraindicated                              [X] Aspirin (includes PO) administered or contraindicated                              [X] Antiplatelet (includes PO) administered or contraindicated                              [X] Anticoagulant (includes PO) administered or contraindicated                              [X] Angiotensin medication, One:                                  [X] Administered, >= One:                                      [X] Angiotensin-converting enzyme inhibitor (ACE) (includes PO)                          [X] Continuous cardiac monitoring (excludes Holter)     Version: InterQual® 2019 1  Estela Monzon and InterQual®  © 2019 [employer] and/or one of its [place]  All Rights Reserved    CPT only © 2018 [employer]  All Rights Reserved  PLEASE SUBMIT THE COMPLETED FORM TO THE DEPARTMENT OF HUMAN SERVICES - DIVISION OF CLINICAL  REVIEW VIA FAX -[phone] or VIA E-MAIL TO [email or web address]    Signature: Dell Peacock Date:  06/11/20    Confidentiality Notice: The documents accompanying this telecopy may contain confidential information belonging to the sender  The information is intended only for the use of the individual named above  If you are not the intended recipient, you are hereby notified  That any disclosure, copying, distribution or taking of any telecopy is strictly prohibited

## 2021-10-01 ENCOUNTER — APPOINTMENT (EMERGENCY)
Dept: CT IMAGING | Facility: HOSPITAL | Age: 36
End: 2021-10-01
Payer: COMMERCIAL

## 2021-10-01 ENCOUNTER — HOSPITAL ENCOUNTER (EMERGENCY)
Facility: HOSPITAL | Age: 36
Discharge: HOME | End: 2021-10-01
Attending: EMERGENCY MEDICINE | Admitting: EMERGENCY MEDICINE
Payer: COMMERCIAL

## 2021-10-01 ENCOUNTER — APPOINTMENT (EMERGENCY)
Dept: RADIOLOGY | Facility: HOSPITAL | Age: 36
End: 2021-10-01
Payer: COMMERCIAL

## 2021-10-01 VITALS
TEMPERATURE: 98.3 F | RESPIRATION RATE: 16 BRPM | OXYGEN SATURATION: 98 % | DIASTOLIC BLOOD PRESSURE: 80 MMHG | SYSTOLIC BLOOD PRESSURE: 105 MMHG | HEART RATE: 80 BPM

## 2021-10-01 DIAGNOSIS — J18.9 LEFT UPPER LOBE PNEUMONIA: ICD-10-CM

## 2021-10-01 DIAGNOSIS — R07.9 CHEST PAIN: Primary | ICD-10-CM

## 2021-10-01 LAB
ANION GAP SERPL CALCULATED.3IONS-SCNC: 11 MMOL/L (ref 4–13)
ATRIAL RATE: 100 BPM
ATRIAL RATE: 107 BPM
BASOPHILS # BLD AUTO: 0.02 THOUSANDS/ΜL (ref 0–0.1)
BASOPHILS NFR BLD AUTO: 0 % (ref 0–1)
BUN SERPL-MCNC: 13 MG/DL (ref 5–25)
CALCIUM SERPL-MCNC: 9.6 MG/DL (ref 8.3–10.1)
CHLORIDE SERPL-SCNC: 99 MMOL/L (ref 100–108)
CO2 SERPL-SCNC: 26 MMOL/L (ref 21–32)
CREAT SERPL-MCNC: 1.08 MG/DL (ref 0.6–1.3)
D DIMER PPP FEU-MCNC: 0.72 UG/ML FEU
EOSINOPHIL # BLD AUTO: 0.07 THOUSAND/ΜL (ref 0–0.61)
EOSINOPHIL NFR BLD AUTO: 1 % (ref 0–6)
ERYTHROCYTE [DISTWIDTH] IN BLOOD BY AUTOMATED COUNT: 12.5 % (ref 11.6–15.1)
GFR SERPL CREATININE-BSD FRML MDRD: 87 ML/MIN/1.73SQ M
GLUCOSE SERPL-MCNC: 340 MG/DL (ref 65–140)
HCT VFR BLD AUTO: 43.8 % (ref 36.5–49.3)
HGB BLD-MCNC: 14.9 G/DL (ref 12–17)
IMM GRANULOCYTES # BLD AUTO: 0.04 THOUSAND/UL (ref 0–0.2)
IMM GRANULOCYTES NFR BLD AUTO: 0 % (ref 0–2)
LYMPHOCYTES # BLD AUTO: 1.59 THOUSANDS/ΜL (ref 0.6–4.47)
LYMPHOCYTES NFR BLD AUTO: 17 % (ref 14–44)
MCH RBC QN AUTO: 30.1 PG (ref 26.8–34.3)
MCHC RBC AUTO-ENTMCNC: 34 G/DL (ref 31.4–37.4)
MCV RBC AUTO: 89 FL (ref 82–98)
MONOCYTES # BLD AUTO: 0.42 THOUSAND/ΜL (ref 0.17–1.22)
MONOCYTES NFR BLD AUTO: 5 % (ref 4–12)
NEUTROPHILS # BLD AUTO: 7.17 THOUSANDS/ΜL (ref 1.85–7.62)
NEUTS SEG NFR BLD AUTO: 77 % (ref 43–75)
NRBC BLD AUTO-RTO: 0 /100 WBCS
P AXIS: 49 DEGREES
P AXIS: 55 DEGREES
PLATELET # BLD AUTO: 276 THOUSANDS/UL (ref 149–390)
PMV BLD AUTO: 10.8 FL (ref 8.9–12.7)
POTASSIUM SERPL-SCNC: 4 MMOL/L (ref 3.5–5.3)
PR INTERVAL: 150 MS
PR INTERVAL: 150 MS
QRS AXIS: 37 DEGREES
QRS AXIS: 49 DEGREES
QRSD INTERVAL: 80 MS
QRSD INTERVAL: 84 MS
QT INTERVAL: 318 MS
QT INTERVAL: 346 MS
QTC INTERVAL: 424 MS
QTC INTERVAL: 446 MS
RBC # BLD AUTO: 4.95 MILLION/UL (ref 3.88–5.62)
SODIUM SERPL-SCNC: 136 MMOL/L (ref 136–145)
T WAVE AXIS: 35 DEGREES
T WAVE AXIS: 9 DEGREES
TROPONIN I SERPL-MCNC: <0.02 NG/ML
TROPONIN I SERPL-MCNC: <0.02 NG/ML
VENTRICULAR RATE: 100 BPM
VENTRICULAR RATE: 107 BPM
WBC # BLD AUTO: 9.31 THOUSAND/UL (ref 4.31–10.16)

## 2021-10-01 PROCEDURE — 99244 OFF/OP CNSLTJ NEW/EST MOD 40: CPT

## 2021-10-01 PROCEDURE — 85379 FIBRIN DEGRADATION QUANT: CPT | Performed by: EMERGENCY MEDICINE

## 2021-10-01 PROCEDURE — 93005 ELECTROCARDIOGRAM TRACING: CPT

## 2021-10-01 PROCEDURE — 71045 X-RAY EXAM CHEST 1 VIEW: CPT

## 2021-10-01 PROCEDURE — 80048 BASIC METABOLIC PNL TOTAL CA: CPT | Performed by: EMERGENCY MEDICINE

## 2021-10-01 PROCEDURE — 74177 CT ABD & PELVIS W/CONTRAST: CPT

## 2021-10-01 PROCEDURE — 96361 HYDRATE IV INFUSION ADD-ON: CPT

## 2021-10-01 PROCEDURE — 84484 ASSAY OF TROPONIN QUANT: CPT | Performed by: EMERGENCY MEDICINE

## 2021-10-01 PROCEDURE — 71275 CT ANGIOGRAPHY CHEST: CPT

## 2021-10-01 PROCEDURE — 93010 ELECTROCARDIOGRAM REPORT: CPT

## 2021-10-01 PROCEDURE — 96374 THER/PROPH/DIAG INJ IV PUSH: CPT

## 2021-10-01 PROCEDURE — 36415 COLL VENOUS BLD VENIPUNCTURE: CPT | Performed by: EMERGENCY MEDICINE

## 2021-10-01 PROCEDURE — 85025 COMPLETE CBC W/AUTO DIFF WBC: CPT | Performed by: EMERGENCY MEDICINE

## 2021-10-01 PROCEDURE — 99285 EMERGENCY DEPT VISIT HI MDM: CPT

## 2021-10-01 PROCEDURE — 99285 EMERGENCY DEPT VISIT HI MDM: CPT | Performed by: EMERGENCY MEDICINE

## 2021-10-01 RX ORDER — AZITHROMYCIN 250 MG/1
500 TABLET, FILM COATED ORAL ONCE
Status: COMPLETED | OUTPATIENT
Start: 2021-10-01 | End: 2021-10-01

## 2021-10-01 RX ORDER — NITROGLYCERIN 0.4 MG/1
0.4 TABLET SUBLINGUAL ONCE
Status: COMPLETED | OUTPATIENT
Start: 2021-10-01 | End: 2021-10-01

## 2021-10-01 RX ORDER — AMOXICILLIN AND CLAVULANATE POTASSIUM 875; 125 MG/1; MG/1
1 TABLET, FILM COATED ORAL EVERY 12 HOURS
Qty: 14 TABLET | Refills: 0 | Status: SHIPPED | OUTPATIENT
Start: 2021-10-01 | End: 2021-10-08

## 2021-10-01 RX ORDER — AZITHROMYCIN 250 MG/1
250 TABLET, FILM COATED ORAL EVERY 24 HOURS
Qty: 4 TABLET | Refills: 0 | Status: SHIPPED | OUTPATIENT
Start: 2021-10-01 | End: 2021-10-05

## 2021-10-01 RX ORDER — NITROGLYCERIN 0.4 MG/1
TABLET SUBLINGUAL
Status: COMPLETED
Start: 2021-10-01 | End: 2021-10-01

## 2021-10-01 RX ORDER — AMOXICILLIN AND CLAVULANATE POTASSIUM 875; 125 MG/1; MG/1
1 TABLET, FILM COATED ORAL ONCE
Status: COMPLETED | OUTPATIENT
Start: 2021-10-01 | End: 2021-10-01

## 2021-10-01 RX ORDER — FENTANYL CITRATE 50 UG/ML
50 INJECTION, SOLUTION INTRAMUSCULAR; INTRAVENOUS ONCE
Status: COMPLETED | OUTPATIENT
Start: 2021-10-01 | End: 2021-10-01

## 2021-10-01 RX ADMIN — AMOXICILLIN AND CLAVULANATE POTASSIUM 1 TABLET: 875; 125 TABLET, FILM COATED ORAL at 16:02

## 2021-10-01 RX ADMIN — FENTANYL CITRATE 50 MCG: 50 INJECTION INTRAMUSCULAR; INTRAVENOUS at 11:46

## 2021-10-01 RX ADMIN — AZITHROMYCIN MONOHYDRATE 500 MG: 250 TABLET ORAL at 16:02

## 2021-10-01 RX ADMIN — NITROGLYCERIN 0.4 MG: 0.4 TABLET SUBLINGUAL at 11:19

## 2021-10-01 RX ADMIN — SODIUM CHLORIDE 1000 ML: 0.9 INJECTION, SOLUTION INTRAVENOUS at 11:12

## 2021-10-01 RX ADMIN — IOHEXOL 100 ML: 350 INJECTION, SOLUTION INTRAVENOUS at 14:20

## 2021-10-03 LAB
ATRIAL RATE: 75 BPM
P AXIS: 57 DEGREES
PR INTERVAL: 166 MS
QRS AXIS: 48 DEGREES
QRSD INTERVAL: 80 MS
QT INTERVAL: 344 MS
QTC INTERVAL: 384 MS
T WAVE AXIS: 38 DEGREES
VENTRICULAR RATE: 75 BPM

## 2021-10-03 PROCEDURE — 93010 ELECTROCARDIOGRAM REPORT: CPT | Performed by: INTERNAL MEDICINE

## 2024-07-24 ENCOUNTER — HOSPITAL ENCOUNTER (INPATIENT)
Facility: HOSPITAL | Age: 39
LOS: 3 days | Discharge: HOME | End: 2024-07-29
Attending: EMERGENCY MEDICINE | Admitting: INTERNAL MEDICINE
Payer: COMMERCIAL

## 2024-07-24 ENCOUNTER — APPOINTMENT (EMERGENCY)
Dept: RADIOLOGY | Facility: HOSPITAL | Age: 39
End: 2024-07-24
Payer: COMMERCIAL

## 2024-07-24 DIAGNOSIS — R07.9 CHEST PAIN, RULE OUT ACUTE MYOCARDIAL INFARCTION: ICD-10-CM

## 2024-07-24 DIAGNOSIS — I31.9 PERICARDITIS: ICD-10-CM

## 2024-07-24 DIAGNOSIS — Z95.5 STATUS POST INSERTION OF DRUG ELUTING CORONARY ARTERY STENT: Primary | ICD-10-CM

## 2024-07-24 DIAGNOSIS — I25.10 CORONARY ARTERY DISEASE INVOLVING NATIVE HEART WITHOUT ANGINA PECTORIS, UNSPECIFIED VESSEL OR LESION TYPE: ICD-10-CM

## 2024-07-24 DIAGNOSIS — K21.9 GERD (GASTROESOPHAGEAL REFLUX DISEASE): ICD-10-CM

## 2024-07-24 DIAGNOSIS — I25.10 CORONARY ARTERY DISEASE: ICD-10-CM

## 2024-07-24 DIAGNOSIS — E66.01 MORBID OBESITY WITH BODY MASS INDEX (BMI) OF 40.0 TO 44.9 IN ADULT (HCC): Chronic | ICD-10-CM

## 2024-07-24 DIAGNOSIS — I10 ESSENTIAL HYPERTENSION: Chronic | ICD-10-CM

## 2024-07-24 DIAGNOSIS — E78.00 HYPERCHOLESTEROLEMIA: Chronic | ICD-10-CM

## 2024-07-24 DIAGNOSIS — I20.89 STABLE ANGINA PECTORIS: ICD-10-CM

## 2024-07-24 DIAGNOSIS — R07.9 CHEST PAIN: ICD-10-CM

## 2024-07-24 DIAGNOSIS — R07.9 EXERTIONAL CHEST PAIN: ICD-10-CM

## 2024-07-24 DIAGNOSIS — E11.65 TYPE 2 DIABETES MELLITUS WITH HYPERGLYCEMIA, WITHOUT LONG-TERM CURRENT USE OF INSULIN (HCC): ICD-10-CM

## 2024-07-24 PROBLEM — Z87.898 HISTORY OF DIARRHEA: Status: ACTIVE | Noted: 2024-07-24

## 2024-07-24 LAB
2HR DELTA HS TROPONIN: -1 NG/L
4HR DELTA HS TROPONIN: 0 NG/L
ANION GAP SERPL CALCULATED.3IONS-SCNC: 8 MMOL/L (ref 4–13)
BASOPHILS # BLD AUTO: 0.01 THOUSANDS/ÂΜL (ref 0–0.1)
BASOPHILS NFR BLD AUTO: 0 % (ref 0–1)
BNP SERPL-MCNC: 30 PG/ML (ref 0–100)
BUN SERPL-MCNC: 8 MG/DL (ref 5–25)
CALCIUM SERPL-MCNC: 8.6 MG/DL (ref 8.4–10.2)
CARDIAC TROPONIN I PNL SERPL HS: 5 NG/L
CARDIAC TROPONIN I PNL SERPL HS: 6 NG/L
CARDIAC TROPONIN I PNL SERPL HS: 6 NG/L
CHLORIDE SERPL-SCNC: 105 MMOL/L (ref 96–108)
CO2 SERPL-SCNC: 24 MMOL/L (ref 21–32)
CREAT SERPL-MCNC: 0.79 MG/DL (ref 0.6–1.3)
EOSINOPHIL # BLD AUTO: 0.18 THOUSAND/ÂΜL (ref 0–0.61)
EOSINOPHIL NFR BLD AUTO: 3 % (ref 0–6)
ERYTHROCYTE [DISTWIDTH] IN BLOOD BY AUTOMATED COUNT: 13.1 % (ref 11.6–15.1)
GFR SERPL CREATININE-BSD FRML MDRD: 112 ML/MIN/1.73SQ M
GLUCOSE SERPL-MCNC: 207 MG/DL (ref 65–140)
GLUCOSE SERPL-MCNC: 97 MG/DL (ref 65–140)
HCT VFR BLD AUTO: 39.9 % (ref 36.5–49.3)
HGB BLD-MCNC: 13.4 G/DL (ref 12–17)
IMM GRANULOCYTES # BLD AUTO: 0.11 THOUSAND/UL (ref 0–0.2)
IMM GRANULOCYTES NFR BLD AUTO: 2 % (ref 0–2)
LYMPHOCYTES # BLD AUTO: 1.81 THOUSANDS/ÂΜL (ref 0.6–4.47)
LYMPHOCYTES NFR BLD AUTO: 26 % (ref 14–44)
MCH RBC QN AUTO: 29.5 PG (ref 26.8–34.3)
MCHC RBC AUTO-ENTMCNC: 33.6 G/DL (ref 31.4–37.4)
MCV RBC AUTO: 88 FL (ref 82–98)
MONOCYTES # BLD AUTO: 0.53 THOUSAND/ÂΜL (ref 0.17–1.22)
MONOCYTES NFR BLD AUTO: 8 % (ref 4–12)
NEUTROPHILS # BLD AUTO: 4.31 THOUSANDS/ÂΜL (ref 1.85–7.62)
NEUTS SEG NFR BLD AUTO: 61 % (ref 43–75)
NRBC BLD AUTO-RTO: 0 /100 WBCS
PLATELET # BLD AUTO: 284 THOUSANDS/UL (ref 149–390)
PMV BLD AUTO: 9.9 FL (ref 8.9–12.7)
POTASSIUM SERPL-SCNC: 3.7 MMOL/L (ref 3.5–5.3)
RBC # BLD AUTO: 4.55 MILLION/UL (ref 3.88–5.62)
SODIUM SERPL-SCNC: 137 MMOL/L (ref 135–147)
WBC # BLD AUTO: 6.95 THOUSAND/UL (ref 4.31–10.16)

## 2024-07-24 PROCEDURE — 84484 ASSAY OF TROPONIN QUANT: CPT

## 2024-07-24 PROCEDURE — 80048 BASIC METABOLIC PNL TOTAL CA: CPT | Performed by: EMERGENCY MEDICINE

## 2024-07-24 PROCEDURE — 82948 REAGENT STRIP/BLOOD GLUCOSE: CPT

## 2024-07-24 PROCEDURE — 87505 NFCT AGENT DETECTION GI: CPT

## 2024-07-24 PROCEDURE — 36415 COLL VENOUS BLD VENIPUNCTURE: CPT | Performed by: EMERGENCY MEDICINE

## 2024-07-24 PROCEDURE — 71046 X-RAY EXAM CHEST 2 VIEWS: CPT

## 2024-07-24 PROCEDURE — 99285 EMERGENCY DEPT VISIT HI MDM: CPT

## 2024-07-24 PROCEDURE — 85025 COMPLETE CBC W/AUTO DIFF WBC: CPT | Performed by: EMERGENCY MEDICINE

## 2024-07-24 PROCEDURE — 99285 EMERGENCY DEPT VISIT HI MDM: CPT | Performed by: EMERGENCY MEDICINE

## 2024-07-24 PROCEDURE — 83880 ASSAY OF NATRIURETIC PEPTIDE: CPT | Performed by: EMERGENCY MEDICINE

## 2024-07-24 PROCEDURE — 99223 1ST HOSP IP/OBS HIGH 75: CPT

## 2024-07-24 PROCEDURE — 93005 ELECTROCARDIOGRAM TRACING: CPT

## 2024-07-24 PROCEDURE — 84484 ASSAY OF TROPONIN QUANT: CPT | Performed by: EMERGENCY MEDICINE

## 2024-07-24 RX ORDER — METRONIDAZOLE 500 MG/1
500 TABLET ORAL EVERY 8 HOURS SCHEDULED
Status: DISCONTINUED | OUTPATIENT
Start: 2024-07-24 | End: 2024-07-28

## 2024-07-24 RX ORDER — ENOXAPARIN SODIUM 100 MG/ML
40 INJECTION SUBCUTANEOUS DAILY
Status: DISCONTINUED | OUTPATIENT
Start: 2024-07-25 | End: 2024-07-29 | Stop reason: HOSPADM

## 2024-07-24 RX ORDER — POTASSIUM CHLORIDE 20 MEQ/1
20 TABLET, EXTENDED RELEASE ORAL ONCE
Status: COMPLETED | OUTPATIENT
Start: 2024-07-24 | End: 2024-07-24

## 2024-07-24 RX ORDER — ASPIRIN 81 MG/1
324 TABLET, CHEWABLE ORAL ONCE
Status: COMPLETED | OUTPATIENT
Start: 2024-07-24 | End: 2024-07-24

## 2024-07-24 RX ORDER — INSULIN LISPRO 100 [IU]/ML
1-6 INJECTION, SOLUTION INTRAVENOUS; SUBCUTANEOUS
Status: DISCONTINUED | OUTPATIENT
Start: 2024-07-25 | End: 2024-07-29 | Stop reason: HOSPADM

## 2024-07-24 RX ORDER — METOPROLOL SUCCINATE 50 MG/1
50 TABLET, EXTENDED RELEASE ORAL DAILY
Status: DISCONTINUED | OUTPATIENT
Start: 2024-07-25 | End: 2024-07-29 | Stop reason: HOSPADM

## 2024-07-24 RX ORDER — LISINOPRIL 5 MG/1
5 TABLET ORAL DAILY
Status: DISCONTINUED | OUTPATIENT
Start: 2024-07-25 | End: 2024-07-25

## 2024-07-24 RX ORDER — PANTOPRAZOLE SODIUM 40 MG/1
40 TABLET, DELAYED RELEASE ORAL
Status: DISCONTINUED | OUTPATIENT
Start: 2024-07-25 | End: 2024-07-29 | Stop reason: HOSPADM

## 2024-07-24 RX ORDER — INSULIN LISPRO 100 [IU]/ML
1-6 INJECTION, SOLUTION INTRAVENOUS; SUBCUTANEOUS
Status: DISCONTINUED | OUTPATIENT
Start: 2024-07-24 | End: 2024-07-29 | Stop reason: HOSPADM

## 2024-07-24 RX ORDER — EZETIMIBE 10 MG/1
10 TABLET ORAL DAILY
Status: DISCONTINUED | OUTPATIENT
Start: 2024-07-25 | End: 2024-07-29 | Stop reason: HOSPADM

## 2024-07-24 RX ORDER — ATORVASTATIN CALCIUM 40 MG/1
40 TABLET, FILM COATED ORAL
Status: DISCONTINUED | OUTPATIENT
Start: 2024-07-25 | End: 2024-07-26

## 2024-07-24 RX ADMIN — POTASSIUM CHLORIDE 20 MEQ: 1500 TABLET, EXTENDED RELEASE ORAL at 22:38

## 2024-07-24 RX ADMIN — METRONIDAZOLE 500 MG: 500 TABLET ORAL at 22:38

## 2024-07-24 RX ADMIN — ASPIRIN 81 MG CHEWABLE TABLET 324 MG: 81 TABLET CHEWABLE at 18:51

## 2024-07-24 NOTE — ED NOTES
Pt given supplies by RN to collect stool sample and instructed how to collect. Pt unable to go at this time but will follow instructions when pt feels the need to make a bowel movement.      Fermín Diaz RN  07/24/24 1959

## 2024-07-25 ENCOUNTER — APPOINTMENT (OUTPATIENT)
Dept: NON INVASIVE DIAGNOSTICS | Facility: HOSPITAL | Age: 39
End: 2024-07-25
Payer: COMMERCIAL

## 2024-07-25 LAB
ANION GAP SERPL CALCULATED.3IONS-SCNC: 6 MMOL/L (ref 4–13)
AORTIC ROOT: 3.5 CM
AORTIC VALVE MEAN VELOCITY: 9.1 M/S
APICAL FOUR CHAMBER EJECTION FRACTION: 64 %
ASCENDING AORTA: 3.2 CM
ATRIAL RATE: 75 BPM
ATRIAL RATE: 80 BPM
ATRIAL RATE: 86 BPM
AV AREA BY CONTINUOUS VTI: 3.1 CM2
AV AREA PEAK VELOCITY: 3 CM2
AV LVOT MEAN GRADIENT: 2 MMHG
AV LVOT PEAK GRADIENT: 4 MMHG
AV MEAN GRADIENT: 4 MMHG
AV PEAK GRADIENT: 7 MMHG
AV VALVE AREA: 3.12 CM2
AV VELOCITY RATIO: 0.73
BASOPHILS # BLD AUTO: 0.02 THOUSANDS/ÂΜL (ref 0–0.1)
BASOPHILS NFR BLD AUTO: 0 % (ref 0–1)
BSA FOR ECHO PROCEDURE: 2.13 M2
BUN SERPL-MCNC: 8 MG/DL (ref 5–25)
C COLI+JEJUNI TUF STL QL NAA+PROBE: NEGATIVE
CALCIUM SERPL-MCNC: 9 MG/DL (ref 8.4–10.2)
CARDIAC TROPONIN I PNL SERPL HS: 4 NG/L (ref 8–18)
CHLORIDE SERPL-SCNC: 107 MMOL/L (ref 96–108)
CHOLEST SERPL-MCNC: 165 MG/DL
CO2 SERPL-SCNC: 27 MMOL/L (ref 21–32)
CREAT SERPL-MCNC: 0.62 MG/DL (ref 0.6–1.3)
DOP CALC AO PEAK VEL: 1.31 M/S
DOP CALC AO VTI: 25.62 CM
DOP CALC LVOT AREA: 4.15 CM2
DOP CALC LVOT CARDIAC INDEX: 2.77 L/MIN/M2
DOP CALC LVOT CARDIAC OUTPUT: 5.89 L/MIN
DOP CALC LVOT DIAMETER: 2.3 CM
DOP CALC LVOT PEAK VEL VTI: 19.26 CM
DOP CALC LVOT PEAK VEL: 0.96 M/S
DOP CALC LVOT STROKE INDEX: 37.6 ML/M2
DOP CALC LVOT STROKE VOLUME: 79.98
E WAVE DECELERATION TIME: 152 MS
E/A RATIO: 1.63
EC STX1+STX2 GENES STL QL NAA+PROBE: NEGATIVE
EOSINOPHIL # BLD AUTO: 0.13 THOUSAND/ÂΜL (ref 0–0.61)
EOSINOPHIL NFR BLD AUTO: 2 % (ref 0–6)
ERYTHROCYTE [DISTWIDTH] IN BLOOD BY AUTOMATED COUNT: 13.2 % (ref 11.6–15.1)
EST. AVERAGE GLUCOSE BLD GHB EST-MCNC: 166 MG/DL
FRACTIONAL SHORTENING: 39 (ref 28–44)
GFR SERPL CREATININE-BSD FRML MDRD: 124 ML/MIN/1.73SQ M
GLUCOSE P FAST SERPL-MCNC: 124 MG/DL (ref 65–99)
GLUCOSE SERPL-MCNC: 120 MG/DL (ref 65–140)
GLUCOSE SERPL-MCNC: 124 MG/DL (ref 65–140)
GLUCOSE SERPL-MCNC: 135 MG/DL (ref 65–140)
GLUCOSE SERPL-MCNC: 178 MG/DL (ref 65–140)
GLUCOSE SERPL-MCNC: 219 MG/DL (ref 65–140)
HBA1C MFR BLD: 7.4 %
HCT VFR BLD AUTO: 41.9 % (ref 36.5–49.3)
HDLC SERPL-MCNC: 22 MG/DL
HGB BLD-MCNC: 13.8 G/DL (ref 12–17)
IMM GRANULOCYTES # BLD AUTO: 0.07 THOUSAND/UL (ref 0–0.2)
IMM GRANULOCYTES NFR BLD AUTO: 1 % (ref 0–2)
INTERVENTRICULAR SEPTUM IN DIASTOLE (PARASTERNAL SHORT AXIS VIEW): 1.2 CM
INTERVENTRICULAR SEPTUM: 1.2 CM (ref 0.6–1.1)
LAAS-AP2: 18.3 CM2
LAAS-AP4: 20.5 CM2
LDLC SERPL CALC-MCNC: 109 MG/DL (ref 0–100)
LEFT ATRIUM SIZE: 3.7 CM
LEFT ATRIUM VOLUME (MOD BIPLANE): 60 ML
LEFT ATRIUM VOLUME INDEX (MOD BIPLANE): 28.2 ML/M2
LEFT INTERNAL DIMENSION IN SYSTOLE: 2.8 CM (ref 2.1–4)
LEFT VENTRICULAR INTERNAL DIMENSION IN DIASTOLE: 4.6 CM (ref 3.5–6)
LEFT VENTRICULAR POSTERIOR WALL IN END DIASTOLE: 1.2 CM
LEFT VENTRICULAR STROKE VOLUME: 67 ML
LVSV (TEICH): 67 ML
LYMPHOCYTES # BLD AUTO: 1.74 THOUSANDS/ÂΜL (ref 0.6–4.47)
LYMPHOCYTES NFR BLD AUTO: 33 % (ref 14–44)
MAGNESIUM SERPL-MCNC: 2.1 MG/DL (ref 1.9–2.7)
MCH RBC QN AUTO: 28.9 PG (ref 26.8–34.3)
MCHC RBC AUTO-ENTMCNC: 32.9 G/DL (ref 31.4–37.4)
MCV RBC AUTO: 88 FL (ref 82–98)
MONOCYTES # BLD AUTO: 0.44 THOUSAND/ÂΜL (ref 0.17–1.22)
MONOCYTES NFR BLD AUTO: 8 % (ref 4–12)
MV E'TISSUE VEL-LAT: 13 CM/S
MV E'TISSUE VEL-SEP: 11 CM/S
MV PEAK A VEL: 0.51 M/S
MV PEAK E VEL: 83 CM/S
NEUTROPHILS # BLD AUTO: 2.91 THOUSANDS/ÂΜL (ref 1.85–7.62)
NEUTS SEG NFR BLD AUTO: 56 % (ref 43–75)
NONHDLC SERPL-MCNC: 143 MG/DL
NRBC BLD AUTO-RTO: 0 /100 WBCS
P AXIS: 51 DEGREES
P AXIS: 53 DEGREES
P AXIS: 54 DEGREES
PLATELET # BLD AUTO: 266 THOUSANDS/UL (ref 149–390)
PMV BLD AUTO: 10 FL (ref 8.9–12.7)
POTASSIUM SERPL-SCNC: 4.3 MMOL/L (ref 3.5–5.3)
PR INTERVAL: 152 MS
PR INTERVAL: 158 MS
PR INTERVAL: 162 MS
QRS AXIS: 39 DEGREES
QRS AXIS: 39 DEGREES
QRS AXIS: 43 DEGREES
QRSD INTERVAL: 82 MS
QRSD INTERVAL: 82 MS
QRSD INTERVAL: 92 MS
QT INTERVAL: 338 MS
QT INTERVAL: 366 MS
QT INTERVAL: 376 MS
QTC INTERVAL: 404 MS
QTC INTERVAL: 408 MS
QTC INTERVAL: 433 MS
RA PRESSURE ESTIMATED: 5 MMHG
RBC # BLD AUTO: 4.77 MILLION/UL (ref 3.88–5.62)
RIGHT ATRIUM AREA SYSTOLE A4C: 13.5 CM2
RIGHT VENTRICLE ID DIMENSION: 3.9 CM
RV PSP: 30 MMHG
SALMONELLA SP SPAO STL QL NAA+PROBE: NEGATIVE
SHIGELLA SP+EIEC IPAH STL QL NAA+PROBE: NEGATIVE
SINOTUBULAR JUNCTION: 2.5 CM
SL CV LEFT ATRIUM LENGTH A2C: 4.9 CM
SL CV LV EF: 64
SL CV PED ECHO LEFT VENTRICLE DIASTOLIC VOLUME (MOD BIPLANE) 2D: 97 ML
SL CV PED ECHO LEFT VENTRICLE SYSTOLIC VOLUME (MOD BIPLANE) 2D: 30 ML
SL CV SINUS OF VALSALVA 2D: 3.5 CM
SODIUM SERPL-SCNC: 140 MMOL/L (ref 135–147)
STJ: 2.5 CM
T WAVE AXIS: 30 DEGREES
T WAVE AXIS: 33 DEGREES
T WAVE AXIS: 39 DEGREES
TR MAX PG: 25 MMHG
TR PEAK VELOCITY: 2.5 M/S
TRICUSPID ANNULAR PLANE SYSTOLIC EXCURSION: 2.2 CM
TRICUSPID VALVE PEAK REGURGITATION VELOCITY: 2.81 M/S
TRIGL SERPL-MCNC: 170 MG/DL
VENTRICULAR RATE: 75 BPM
VENTRICULAR RATE: 80 BPM
VENTRICULAR RATE: 86 BPM
WBC # BLD AUTO: 5.31 THOUSAND/UL (ref 4.31–10.16)

## 2024-07-25 PROCEDURE — 80061 LIPID PANEL: CPT

## 2024-07-25 PROCEDURE — 80048 BASIC METABOLIC PNL TOTAL CA: CPT

## 2024-07-25 PROCEDURE — 82948 REAGENT STRIP/BLOOD GLUCOSE: CPT

## 2024-07-25 PROCEDURE — 99244 OFF/OP CNSLTJ NEW/EST MOD 40: CPT | Performed by: STUDENT IN AN ORGANIZED HEALTH CARE EDUCATION/TRAINING PROGRAM

## 2024-07-25 PROCEDURE — 93306 TTE W/DOPPLER COMPLETE: CPT

## 2024-07-25 PROCEDURE — 83036 HEMOGLOBIN GLYCOSYLATED A1C: CPT

## 2024-07-25 PROCEDURE — 84484 ASSAY OF TROPONIN QUANT: CPT

## 2024-07-25 PROCEDURE — 85025 COMPLETE CBC W/AUTO DIFF WBC: CPT

## 2024-07-25 PROCEDURE — 83735 ASSAY OF MAGNESIUM: CPT

## 2024-07-25 PROCEDURE — 93306 TTE W/DOPPLER COMPLETE: CPT | Performed by: STUDENT IN AN ORGANIZED HEALTH CARE EDUCATION/TRAINING PROGRAM

## 2024-07-25 PROCEDURE — 93010 ELECTROCARDIOGRAM REPORT: CPT | Performed by: INTERNAL MEDICINE

## 2024-07-25 PROCEDURE — 99232 SBSQ HOSP IP/OBS MODERATE 35: CPT | Performed by: INTERNAL MEDICINE

## 2024-07-25 RX ORDER — LISINOPRIL 5 MG/1
5 TABLET ORAL DAILY
Status: DISCONTINUED | OUTPATIENT
Start: 2024-07-27 | End: 2024-07-29 | Stop reason: HOSPADM

## 2024-07-25 RX ADMIN — METRONIDAZOLE 500 MG: 500 TABLET ORAL at 06:10

## 2024-07-25 RX ADMIN — PANTOPRAZOLE SODIUM 40 MG: 40 TABLET, DELAYED RELEASE ORAL at 06:10

## 2024-07-25 RX ADMIN — METRONIDAZOLE 500 MG: 500 TABLET ORAL at 13:10

## 2024-07-25 RX ADMIN — LISINOPRIL 5 MG: 5 TABLET ORAL at 09:40

## 2024-07-25 RX ADMIN — INSULIN LISPRO 2 UNITS: 100 INJECTION, SOLUTION INTRAVENOUS; SUBCUTANEOUS at 21:05

## 2024-07-25 RX ADMIN — METRONIDAZOLE 500 MG: 500 TABLET ORAL at 21:05

## 2024-07-25 RX ADMIN — ENOXAPARIN SODIUM 40 MG: 40 INJECTION SUBCUTANEOUS at 09:40

## 2024-07-25 RX ADMIN — ASPIRIN 81 MG: 81 TABLET, COATED ORAL at 09:40

## 2024-07-25 RX ADMIN — EZETIMIBE 10 MG: 10 TABLET ORAL at 09:40

## 2024-07-25 RX ADMIN — METOPROLOL SUCCINATE 50 MG: 50 TABLET, EXTENDED RELEASE ORAL at 09:40

## 2024-07-25 RX ADMIN — INSULIN LISPRO 1 UNITS: 100 INJECTION, SOLUTION INTRAVENOUS; SUBCUTANEOUS at 13:10

## 2024-07-25 RX ADMIN — ATORVASTATIN CALCIUM 40 MG: 40 TABLET, FILM COATED ORAL at 17:45

## 2024-07-25 NOTE — PLAN OF CARE

## 2024-07-25 NOTE — UTILIZATION REVIEW
Initial Clinical Review    Admission: Date/Time/Statement:   Admission Orders (From admission, onward)       Ordered        07/24/24 2038  Place in Observation  Once                          Orders Placed This Encounter   Procedures    Place in Observation     Standing Status:   Standing     Number of Occurrences:   1     Order Specific Question:   Level of Care     Answer:   Med Surg [16]     ED Arrival Information       Expected   -    Arrival   7/24/2024 18:02    Acuity   Urgent              Means of arrival   Walk-In    Escorted by   Family Member    Service   Hospitalist    Admission type   Emergency              Arrival complaint   chest pain             Chief Complaint   Patient presents with    Chest Pain     Pt reports intermittent chest pain that started a month ago but over the last week has been constant. Pt describes pain as pressure that comes on strong with numbness to L arm,SOB, dizziness       Initial Presentation: 39 y.o. male presents to the ED from home with exertional CP, SOB, dizzness, dyspnea, diaphoresis intermittently for a month worsening in last week.  Symptoms resolve with rest.  Was just hospitalized in the [facility] for cardiac work up and echo.  Returned home on d/c .  Notes bilat pedal edema, now resolved. Also notes diarrhea, dx with Giardia and started on Flagyl TID.  PMH: HTN, NIDDM, hypercholesterolemia, CAD, diarrhea.  Labs - elevated glucose 207, negative troponins.  Imaging - no acute disease.  ECG - NSR.  Treated with ASA.  On exam normal heart, lung and abd sounds.  Admitted to OBSERVATION status with Exertional CP, CAD - NICK score 4,  tele, lipid profile, cardio consult, Echo.  Stool cultures, Flagyl continues, SSI cover, continue Lisinopril, Metoprolol, statin.      Anticipated Length of Stay/Certification Statement: Patient will be admitted on an observation basis with an anticipated length of stay of less than 2 midnights secondary to chest pain.     Date: 7/25   OBS Day 2:   VS stable.  No med changes, no c/o CP.     7/25 Cardic Consult - Exertional CP, CAD, HTN, symptoms c/w stable angina, troponins negative , no ECG changes.  Abnormal echo in DR. Continue with ASA, Lisinopril, Toprol Xk, statin, Zetia, will do L heart cath on 7/26. NPO p MN, Echo.        ED Triage Vitals   Temperature Pulse Respirations Blood Pressure SpO2 Pain Score   07/24/24 1820 07/24/24 1814 07/24/24 1814 07/24/24 1814 07/24/24 1814 07/24/24 1930   98.1 °F (36.7 °C) 87 20 109/67 97 % 4     Weight (last 2 days)       Date/Time Weight    07/24/24 1814 108 (238.76)            Vital Signs (last 3 days)       Date/Time Temp Pulse Resp BP MAP (mmHg) SpO2 O2 Device Patient Position - Orthostatic VS [place] Coma Scale Score Pain    07/25/24 1127 -- 72 18 110/81 -- -- -- Sitting -- --    07/25/24 0900 -- -- -- -- -- -- None (Room air) -- 15 No Pain    07/25/24 0752 97.9 °F (36.6 °C) 67 18 115/81 -- 97 % None (Room air) Lying -- --    07/25/24 0326 97.5 °F (36.4 °C) 70 18 92/54 68 94 % -- Lying -- --    07/24/24 2350 97.3 °F (36.3 °C) 79 18 111/57 88 95 % -- Lying -- --    07/24/24 2334 -- -- -- -- -- -- None (Room air) -- 15 --    07/24/24 2137 -- -- -- -- -- -- -- -- -- 3    07/24/24 2126 97.8 °F (36.6 °C) 68 18 121/81 88 96 % None (Room air) Lying -- --    07/24/24 2100 -- 68 20 119/72 91 96 % None (Room air) Lying -- --    07/24/24 2000 -- 72 20 124/71 91 96 % None (Room air) Lying -- --    07/24/24 1930 -- -- -- -- -- -- -- -- -- 4    07/24/24 1838 -- -- -- -- -- -- -- -- 15 --    07/24/24 1837 -- -- -- -- -- -- None (Room air) -- -- --    07/24/24 1820 98.1 °F (36.7 °C) -- -- -- -- -- -- -- -- --    07/24/24 1814 -- 87 20 109/67 -- 97 % None (Room air) Sitting -- --              Pertinent Labs/Diagnostic Test Results:   Radiology:  XR chest 2 views   ED Interpretation by Daniel Tuttle MD (07/24 1954)   Primary reviewed; No acute abnotrmality      Final Interpretation by Shantal Paredes MD (07/24  2157)      No acute cardiopulmonary disease.            Workstation performed: [id number]           Cardiology:  ECG 12 lead   Final Result by Ranjan Isabel MD (07/25 0727)   Normal sinus rhythm   Normal ECG   When compared with ECG of 24-JUL-2024 20:40, (unconfirmed)   No significant change was found   Confirmed by Ranjan Isabel ([id number]) on 7/25/2024 7:26:58 AM      ECG 12 lead   Final Result by Ranjan Isabel MD (07/25 0727)   Age and gender specific ECG analysis    Sinus rhythm with sinus arrhythmia   Normal ECG      Confirmed by Ranjan Isabel ([id number]) on 7/25/2024 7:27:44 AM      ECG 12 lead   Final Result by Ranjan Isabel MD (07/25 0729)   Age and gender specific ECG analysis    Normal sinus rhythm   Normal ECG      Confirmed by Ranjan Isabel ([id number]) on 7/25/2024 7:29:55 AM        GI:  No orders to display           Results from last 7 days   Lab Units 07/25/24  0616 07/24/24  1823   WBC Thousand/uL 5.31 6.95   HEMOGLOBIN g/dL 13.8 13.4   HEMATOCRIT % 41.9 39.9   PLATELETS Thousands/uL 266 284   TOTAL NEUT ABS Thousands/µL 2.91 4.31         Results from last 7 days   Lab Units 07/25/24  0616 07/24/24  1823   SODIUM mmol/L 140 137   POTASSIUM mmol/L 4.3 3.7   CHLORIDE mmol/L 107 105   CO2 mmol/L 27 24   ANION GAP mmol/L 6 8   BUN mg/dL 8 8   CREATININE mg/dL 0.62 0.79   EGFR ml/min/1.73sq m 124 112   CALCIUM mg/dL 9.0 8.6   MAGNESIUM mg/dL 2.1  --          Results from last 7 days   Lab Units 07/25/24  1124 07/25/24  0755 07/24/24  2238   POC GLUCOSE mg/dl 178* 135 97     Results from last 7 days   Lab Units 07/25/24  0616 07/24/24  1823   GLUCOSE RANDOM mg/dL 124 207*         Results from last 7 days   Lab Units 07/25/24  0616   HEMOGLOBIN A1C % 7.4*   EAG mg/dl 166       Results from last 7 days   Lab Units 07/24/24  2235 07/24/24  2041 07/24/24  1823   HS TNI 0HR ng/L  --   --  6   HS TNI 2HR ng/L  --  5  --    HSTNI D2 ng/L  --  -1  --    HS TNI 4HR ng/L 6  --   --    HSTNI D4 ng/L 0  --   --           Results from last 7 days   Lab Units 07/24/24  1823   BNP pg/mL 30     ED Treatment-Medication Administration from 07/24/2024 1802 to 07/24/2024 2118         Date/Time Order Dose Route Action     07/24/2024 1851 aspirin chewable tablet 324 mg 324 mg Oral Given            Past Medical History:   Diagnosis Date    Cardiac disease     Diabetes mellitus (HCC)     Hypertension      Present on Admission:   Type 2 diabetes mellitus with hyperglycemia, without long-term current use of insulin (HCC)   Essential hypertension   Hypercholesterolemia      Admitting Diagnosis: Coronary artery disease [I25.10]  Chest pain [R07.9]  Chest pain, rule out acute myocardial infarction [R07.9]  Age/Sex: 39 y.o. male  Admission Orders:  Scheduled Medications:  aspirin, 81 mg, Oral, Daily  atorvastatin, 40 mg, Oral, Daily With Dinner  enoxaparin, 40 mg, Subcutaneous, Daily  ezetimibe, 10 mg, Oral, Daily  insulin lispro, 1-6 Units, Subcutaneous, TID AC  insulin lispro, 1-6 Units, Subcutaneous, HS  [START ON 7/27/2024] lisinopril, 5 mg, Oral, Daily  metoprolol succinate, 50 mg, Oral, Daily  metroNIDAZOLE, 500 mg, Oral, Q8H JOSEFINA  pantoprazole, 40 mg, Oral, Early Morning      Continuous IV Infusions:     PRN Meds:     Tele  Stool studies  ASA  Statin  POC GLUCOSE AC/HS WITH SSI COVERAGE   PO Flagyl  ADA diet w/ fluid restriction   Echo  IP CONSULT TO CARDIOLOGY    Network Utilization Review Department  ATTENTION: Please call with any questions or concerns to [phone] and carefully listen to the prompts so that you are directed to the right person. All voicemails are confidential.   For Discharge needs, contact Care Management DC Support Team at [phone] opt. [phone]  Send all requests for admission clinical reviews, approved or denied determinations and any other requests to dedicated fax number below belonging to the campus where the patient is receiving treatment. List of dedicated fax numbers for the Facilities:  FACILITY NAME UR FAX  NUMBER   ADMISSION DENIALS (Administrative/Medical Necessity) [phone]   DISCHARGE SUPPORT TEAM (NETWORK) [phone]   PARENT CHILD HEALTH (Maternity/NICU/Pediatrics) [phone]   [facility] [phone]   [facility] [phone]   [facility] [phone]   [facility] [phone]   [facility] [phone]   [facility] [phone]   [facility] [phone]   [facility] [phone]   [facility] [phone]   [facility] [phone]   [facility] [phone]   [facility] [phone]

## 2024-07-25 NOTE — ASSESSMENT & PLAN NOTE
Secondary to stable angina/CAD.  Currently  chest pain-free  For catheterization tomorrow  Continue aspirin, Lipitor Toprol-XL  Hold lisinopril tomorrow

## 2024-07-25 NOTE — PLAN OF CARE

## 2024-07-25 NOTE — ASSESSMENT & PLAN NOTE
"Lab Results   Component Value Date    HGBA1C 9.3 (H) 11/04/2019       No results for input(s): \"POCGLU\" in the last 72 hours.    Blood Sugar Average: Last 72 hrs:  Pt reports taking metformin at home   Hgba1c pending   SSI with accucheks    "

## 2024-07-25 NOTE — CONSULTS
"Cardiology Consult H&P  Jaleel Leblanc 39 y.o. male MRN: [id number]  Unit/Bed#: E4 -01 Encounter: [id number]  Physician Requesting Consult: Christiano Kyle Pe*  Reason for Consult: Exertional chest pain    HPI  39 y.o. male with history of CAD with an MI in 2014 (LHC showing 100% stenosis of OM1 and 95% stenosis of mid circumflex s/p JESUS MANUEL x2), hypertension, hyperlipidemia and type 2 diabetes who presented to the ED on 7/24 complaining of exertional dyspnea and chest pain.  He has been experiencing the symptoms over the last 1 month and they have gotten progressively worse.  At times his symptoms are also associated with lightheadedness, LE edema and diaphoresis.  His symptoms improve with rest.  He reports that he is compliant with all of his home medications.  He denies any smoking, alcohol or drug use. In the ED troponins were negative x4 and he was hemodynamically stable. BNP was 30. ECG showed normal sinus rhythm with no ischemic changes.    Of note, he was recently in the [facility] [place] over the last few weeks he was experiencing the symptoms while he was there and so he went to the hospital [place].  He reports that his \"blood test\" was high at 200 (troponin?)  And he had an echocardiogram that was \"abnormal\".  He reports that they told him that he likely has a \"blocked artery\" and that he needs a cardiac catheterization.  He declined getting a cath at that time as he wanted to come back to the [place] to have it done instead.  He also had been experiencing diarrhea and was reportedly diagnosed with Giardia and has been on Flagyl and possibly Cipro at home.  He reports that his documents from the [place] will be brought in later today.    Today the patient was seen and examined at bedside.  He was resting comfortably and denied any current complaints.  He reports that his anginal symptoms occur with exertion only and improve with rest.  Telemetry shows normal sinus rhythm with no recent " events.    Prior Cardiac Imaging  -TTE 11/5/19:  LEFT VENTRICLE:  Systolic function was normal. Ejection fraction was estimated to be 60 %.  There were no regional wall motion abnormalities.  Wall thickness was mildly increased.    -[facility] 11/4/19:  CORONARY VESSELS:   --  The coronary circulation is co-dominant.  --  Left main: Angiography showed no evidence of disease.  --  LAD: Angiography showed minor luminal irregularities.  --  Mid circumflex: There was a 10 % stenosis.  --  Distal circumflex: There was a diffuse 50 % stenosis.  --  1st obtuse marginal: There was a 10 % stenosis at the site of a prior stent.  --  Proximal RCA: There was a diffuse 60 % stenosis.  --  Distal RCA: There was a 40 % stenosis.    A&P  #Stable Angina  Patient has a prior history of CAD/MI and presented complaining of exertional dyspnea and chest pain over the last month.  In the ED cardiac workup was unremarkable including normal troponins and no ischemic changes on ECG.  He also reports that he was recently admitted to a hospital in [facility] and was told that he needs a cardiac catheterization, but he declined as he wanted [place] the [place] for [place] instead.  Symptoms are consistent with worsening cardiac chest pain. NICK score 4.   Follow-up TTE to assess LV function and evaluate for any regional wall motion abnormalities.   Continue aspirin, Lipitor, Zetia, Toprol-XL and lisinopril  Continue telemetry monitoring  Review documents from hospitalization at [place] when they arrive  Repeat ECG and troponins if symptoms return.  Can give sublingual nitro as needed.  Will plan for cardiac catheterization tomorrow.  N.p.o. at midnight. Hold AM Lisinopril. Pending results of cardiac catheterization, will consider increasing home antianginal regimen.    #CAD  #History of MI with DESx2  MI in 2014 with [facility] showing 100% stenosis of OM and 95% stenosis of mid circumflex s/p JESUS MANUEL x2. He presented in November of 2019 with chest  pain. Cardiac cath at that time showed a patent LM, LAD with minimal luminal irregularities, patent circumflex, unchanged moderate nonobstructive distal circumflex (50% stenosis) and proximal/distal RCA disease (60% and 40% stenosis, respectively).  Symptoms at that time were believed to be due to pericarditis and he was treated.  Continue aspirin, Lipitor, Zetia, Toprol-XL and lisinopril  Counseled on continuing with lifestyle modifications including proper diet and exercise.  Rest of plan as above    #HTN  Continue lisinopril and Toprol-XL    #HLD  Lipid panel from 7/25 showing a total cholesterol of 165, TG of 170, HDL of 22 and an LDL of 109.  Continue Lipitor and Zetia    #DM2  Last A1c in 2019 was elevated at 9.3.  He is on metformin at home.  Follow-up repeat hemoglobin A1c.  Continue SSI and monitor blood sugars.    #Giardia  #Diarrhea  Patient was reportedly recently diagnosed with Giardia and was on Flagyl and possibly Cipro at home.  F/u stool cultures  Continue antibiotics as per primary team.        Intake/Output Summary (Last 24 hours) at 7/25/2024 1201  Last data filed at 7/25/2024 0917  Gross per 24 hour   Intake 180 ml   Output --   Net 180 ml         Ricky Vo MD  -PGY-5 Cardiology Fellow  -[place] text enabled      ======================================================    Physical exam  Vitals: Blood pressure 110/81, pulse 72, temperature 97.9 °F (36.6 °C), temperature source Temporal, resp. rate 18, weight 108 kg (238 lb 12.1 oz), SpO2 97%.  Gen: Well appearing  Psych: AOx3  Skin: Intact  Neck: Supple  Cardiac: S1, S2, regular rate, no S3 or S4 appreciated. No murmurs. +2 PT, radial pulses. No peripheral edema. No carotid bruits.  Resp: CTABL. No crackles  Abd: Nontender, nondistended  Rheum: No joint deformities in UE or LE    ======================================================    TREADMILL STRESS  No results found for this or any previous visit.      ----------------------------------------------------------------------------------------------  NUCLEAR STRESS TEST: No results found for this or any previous visit.    No results found for this or any previous visit.      --------------------------------------------------------------------------------  CATH:  Results for orders placed during the hospital encounter of 19    Cardiac catheterization    Narrative  [address] [address] [phone]    ([place])    Invasive Cardiovascular Lab Complete Report    Patient: BILL CHAUDHRY  MR number: [id number]  Account number: [id number]  Study date: 2019  Gender: Male  : 1985  Height: 65 in  Weight: 268.4 lb  BSA: 2.24 mï¾²    Allergies: NO KNOWN ALLERGIES    Diagnostic Cardiologist:  DO KENZIE Douglas    CORONARY CIRCULATION:  Distal circumflex: There was a diffuse 50 % stenosis.  Proximal RCA: There was a diffuse 60 % stenosis.    INDICATIONS:  --  Possible CAD: unstable angina.    PROCEDURES PERFORMED    --  Left coronary angiography.  --  Right coronary angiography.  --  Mod Sedation Same Physician Initial 15min.  --  Mod Sedation Same Physician Add 15min.  --  Coronary Catheterization (w/o [facility]).    PROCEDURE: The risks and alternatives of the procedures and conscious sedation were explained to the patient and informed consent was obtained. The patient was brought to the cath lab and placed on the table. The planned puncture sites  were prepped and draped in the usual sterile fashion.    --  Right radial artery access. After performing an Rj's test to verify adequate ulnar artery supply to the hand, the radial site was prepped. The puncture site was infiltrated with local anesthetic. The vessel was accessed using the  modified Seldinger technique, a wire was advanced into the vessel, and a sheath was advanced over the wire into the vessel.    --  Left  coronary artery angiography. A catheter was advanced over a guidewire into the aorta and positioned in the left coronary artery ostium under fluoroscopic guidance. Angiography was performed.    --  Right coronary artery angiography. A catheter was advanced over a guidewire into the aorta and positioned in the right coronary artery ostium under fluoroscopic guidance. Angiography was performed.    --  Mod Sedation Same Physician Initial 15min.    --  Mod Sedation Same Physician Add 15min.    --  Coronary Catheterization (w/o [facility]).    PROCEDURE COMPLETION: The patient tolerated the procedure well and was discharged from the cath lab. TIMING: Test started at 13:50. Test concluded at 14:25. HEMOSTASIS: The sheath was removed. The site was compressed with a Hemoband  device. Hemostasis was obtained. MEDICATIONS GIVEN: Fentanyl (1OOmcg/2 ml), 50 mcg, IV, at 13:53. Versed (2mg/2ml), 2 mg, IV, at 13:53. 1% Lidocaine, 1 ml, subcutaneously, at 14:03. Verapamil (5mg/2ml), 2.5 mg, IV, at 14:04. Heparin 1000  units/ml, 4,000 units, IV, at 14:04. Nitroglycerin (200mcg/ml), 200 mcg, at 14:04. CONTRAST GIVEN: 100 ml Omnipaque (350mg I /ml). 20 ml Omnipaque (350mg I /ml). RADIATION EXPOSURE: Fluoroscopy time: 9.02 min.    CORONARY VESSELS:   --  The coronary circulation is co-dominant.  --  Left main: Angiography showed no evidence of disease.  --  LAD: Angiography showed minor luminal irregularities.  --  Mid circumflex: There was a 10 % stenosis.  --  Distal circumflex: There was a diffuse 50 % stenosis.  --  1st obtuse marginal: There was a 10 % stenosis at the site of a prior stent.  --  Proximal RCA: There was a diffuse 60 % stenosis.  --  Distal RCA: There was a 40 % stenosis.    IMPRESSIONS:  Patent circumflex. Unchanged moderate nonobstructive distal circumflex and proximal RCA disease.    RECOMMENDATIONS  med rx. pt off meds for years. needs comply with antiplatelets, statin, weight loss    DISPOSITION:  The patient left the  catheterization laboratory in stable condition.    Prepared and signed by    Portillo Childs DO  Signed 2019 14:59:30    Study diagram    Angiographic findings  Native coronary lesions:  ï¾·Mid circumflex: Lesion 1: 10 % stenosis.  ï¾·Distal circumflex: Lesion 1: diffuse, 50 % stenosis.  ï¾·OM1: Lesion 1: 10 % stenosis, site of prior stent.  ï¾·Proximal RCA: Lesion 1: diffuse, 60 % stenosis.  ï¾·Distal RCA: Lesion 1: 40 % stenosis.    Hemodynamic tables    Outputs:  Baseline  Outputs:  -- CALCULATIONS: [age] 35.59  Outputs:  -- CALCULATIONS: Body Surface Area: 2.24  Outputs:  -- CALCULATIONS: Height in cm: 165.00  Outputs:  -- CALCULATIONS: Sex: Male  Outputs:  -- CALCULATIONS: Weight in k.00    --------------------------------------------------------------------------------  ECHO:   Results for orders placed during the hospital encounter of 19    Echo complete with contrast if indicated    Narrative  [address] [id number] [phone]    Transthoracic Echocardiogram  2D, M-mode, Doppler, and Color Doppler    Study date:  2019    Patient: BILL CHAUDHRY  MR number: [id number]  Account number: [id number]  : 1985  Age: 34 years  Gender: Male  Status: Inpatient  Location: Bedside  Height: 65 in  Weight: 268.4 lb  BP: 107/ 74 mmHg    Indications: Chest pain    Diagnoses: R07.9 - Chest pain, unspecified    Sonographer:  Anders Dave [facility]  Referring Physician:  Anita Cowan DO  Group:  [facility]  Interpreting Physician:  Anita Cowan DO    SUMMARY    LEFT VENTRICLE:  Systolic function was normal. Ejection fraction was estimated to be 60 %.  There were no regional wall motion abnormalities.  Wall thickness was mildly increased.    HISTORY: PRIOR HISTORY: CAD, MI (), HTN, HLD, DM    PROCEDURE: The procedure was performed at the bedside. This was a routine study. The transthoracic  approach was used. The study included complete 2D imaging, M-mode, complete spectral Doppler, and color Doppler. Echocardiographic views  were limited due to poor acoustic window availability and decreased penetration. This was a technically difficult study.    LEFT VENTRICLE: Size was normal. Systolic function was normal. Ejection fraction was estimated to be 60 %. There were no regional wall motion abnormalities. Wall thickness was mildly increased. DOPPLER: Left ventricular diastolic function  parameters were normal.    RIGHT VENTRICLE: The size was normal. Systolic function was normal. Wall thickness was normal.    LEFT ATRIUM: Size was normal.    RIGHT ATRIUM: Size was normal.    MITRAL VALVE: Valve structure was normal. There was normal leaflet separation. DOPPLER: The transmitral velocity was within the normal range. There was no evidence for stenosis. There was no regurgitation.    AORTIC VALVE: The valve was trileaflet. Leaflets exhibited normal thickness and normal cuspal separation. DOPPLER: Transaortic velocity was within the normal range. There was no evidence for stenosis. There was no regurgitation.    TRICUSPID VALVE: The valve structure was normal. There was normal leaflet separation. DOPPLER: The transtricuspid velocity was within the normal range. There was no evidence for stenosis. There was no regurgitation.    PULMONIC VALVE: Not well visualized.    PERICARDIUM: There was no pericardial effusion. The pericardium was normal in appearance.    AORTA: The root exhibited normal size.    PULMONARY ARTERY: DOPPLER: The tricuspid jet envelope definition was inadequate for estimation of RV systolic pressure. There are no indirect findings (abnormal RV volume or geometry, altered pulmonary flow velocity profile, or leftward  septal displacement) which would suggest moderate or severe pulmonary hypertension.    SYSTEM MEASUREMENT TABLES    2D  %FS: 52.1 %  Ao Diam: 2.6 cm  EDV(Teich): 135.4 ml  EF(Teich):  82.9 %  ESV(Teich): 23.2 ml  HR_4Ch_Q: 72.5 BPM  IVSd: 1.2 cm  LA Diam: 3.3 cm  LAAs A4C: 18.8 cm2  LAESV A-L A4C: 56.4 ml  LAESV MOD A4C: 50.4 ml  LALs A4C: 5.3 cm  LVCO_4Ch_Q: 5.6 L/min  LVEF_4Ch_Q: 59.9 %  LVIDd: 5.3 cm  LVIDs: 2.5 cm  LVLd_4Ch_Q: 8.5 cm  LVLs_4Ch_Q: 6.8 cm  LVPWd: 1.2 cm  LVSV_4Ch_Q: 77.5 ml  LVVED_4Ch_Q: 129.3 ml  LVVES_4Ch_Q: 51.8 ml  RA Area: 12.9 cm2  RAAs: 14.9 cm2  RAESV A-L: 41.6 ml  RAESV MOD: 39.6 ml  RALs: 4.6 cm  RVIDd: 3.3 cm  SV(Teich): 112.2 ml    CW  AV Vmax: 1.4 m/s  AV maxP.5 mmHg    MM  TAPSE: 1.8 cm    PW  AVC: 319.1 ms  E' Av.1 m/s  E' Lat: 0.1 m/s  E' Sept: 0.1 m/s  E/E' Av.8  E/E' Lat: 7.7  E/E' Sept: 6.1  LVOT Vmax: 0.9 m/s  LVOT maxPG: 3.6 mmHg  MV A Mich: 0.5 m/s  MV Dec Ceiba: 3.7 m/s2  MV DecT: 230 ms  MV E Mich: 0.9 m/s  MV E/A Ratio: 1.7  MV PHT: 66.7 ms  MVA By PHT: 3.3 cm2    [employer] Accredited Echocardiography Laboratory    Prepared and electronically signed by    Anita Cowan DO  Signed 2019 13:42:22    No results found for this or any previous visit.    --------------------------------------------------------------------------------  HOLTER  No results found for this or any previous visit.    --------------------------------------------------------------------------------  CAROTIDS  No results found for this or any previous visit.       [unfilled]   ======================================================      Review of Systems   Constitutional:  Negative for activity change, appetite change, chills, fatigue and fever.   HENT:  Negative for congestion, ear discharge, ear pain, hearing loss, sinus pain, sore throat, tinnitus and trouble swallowing.    Eyes:  Negative for pain and visual disturbance.   Respiratory:  Negative for cough, chest tightness, shortness of breath and wheezing.    Cardiovascular:  Negative for chest pain and leg swelling.   Gastrointestinal:  Negative for abdominal distention, abdominal pain, anal  bleeding and blood in stool.   Endocrine: Negative for cold intolerance and heat intolerance.   Genitourinary:  Negative for difficulty urinating, dysuria and frequency.   Musculoskeletal:  Negative for arthralgias and myalgias.   Skin:  Negative for rash.   Allergic/Immunologic: Negative for environmental allergies and food allergies.   Neurological:  Negative for dizziness, light-headedness, numbness and headaches.   Hematological:  Negative for adenopathy.   Psychiatric/Behavioral:  Negative for agitation, behavioral problems and confusion.      ROS as noted above, otherwise 12 point review of systems was performed and is negative.     Historical Information   Past Medical History:   Diagnosis Date    Cardiac disease     Diabetes mellitus (HCC)     Hypertension      Past Surgical History:   Procedure Laterality Date    CARDIAC CATHETERIZATION      CORONARY ANGIOPLASTY WITH STENT PLACEMENT       Social History     Substance and Sexual Activity   Alcohol Use Yes    Comment: socailly      Social History     Substance and Sexual Activity   Drug Use No     Social History     Tobacco Use   Smoking Status Never   Smokeless Tobacco Never     Family History: History reviewed. No pertinent family history.    Meds/Allergies   Hospital Medications:   Current Facility-Administered Medications   Medication Dose Route Frequency    aspirin (ECOTRIN LOW STRENGTH) EC tablet 81 mg  81 mg Oral Daily    atorvastatin (LIPITOR) tablet 40 mg  40 mg Oral Daily With Dinner    enoxaparin (LOVENOX) subcutaneous injection 40 mg  40 mg Subcutaneous Daily    ezetimibe (ZETIA) tablet 10 mg  10 mg Oral Daily    insulin lispro (HumALOG/ADMELOG) 100 units/mL subcutaneous injection 1-6 Units  1-6 Units Subcutaneous TID AC    insulin lispro (HumALOG/ADMELOG) 100 units/mL subcutaneous injection 1-6 Units  1-6 Units Subcutaneous HS    [START ON 7/27/2024] lisinopril (ZESTRIL) tablet 5 mg  5 mg Oral Daily    metoprolol succinate (TOPROL-XL) 24 hr  tablet 50 mg  50 mg Oral Daily    metroNIDAZOLE (FLAGYL) tablet 500 mg  500 mg Oral Q8H JOSEFINA    pantoprazole (PROTONIX) EC tablet 40 mg  40 mg Oral Early Morning     Home Medications:   Medications Prior to Admission:     acetaminophen (TYLENOL) 325 mg tablet    aspirin (ECOTRIN LOW STRENGTH) 81 mg EC tablet    atorvastatin (LIPITOR) 40 mg tablet    colchicine (COLCRYS) 0.6 mg tablet    enalapril (VASOTEC) 2.5 mg tablet    ezetimibe (ZETIA) 10 mg tablet    glipiZIDE (GLUCOTROL XL) 5 mg 24 hr tablet    metFORMIN (GLUCOPHAGE) 1000 MG tablet    metoprolol succinate (TOPROL-XL) 50 mg 24 hr tablet    pantoprazole (PROTONIX) 40 mg tablet    No Known Allergies    Objective   Vitals: Blood pressure 110/81, pulse 72, temperature 97.9 °F (36.6 °C), temperature source Temporal, resp. rate 18, weight 108 kg (238 lb 12.1 oz), SpO2 97%.  Orthostatic Blood Pressures      Flowsheet Row Most Recent Value   Blood Pressure 110/81 filed at 07/25/2024 1127   Patient Position - Orthostatic VS Sitting filed at 07/25/2024 1127              Intake/Output Summary (Last 24 hours) at 7/25/2024 1201  Last data filed at 7/25/2024 0917  Gross per 24 hour   Intake 180 ml   Output --   Net 180 ml       Invasive Devices       Peripheral Intravenous Line  Duration             Peripheral IV 07/24/24 Distal;Left;Upper;Ventral (anterior) Arm <1 day                      Lab Results: I have personally reviewed pertinent lab results.    Results from last 7 days   Lab Units 07/25/24  0616 07/24/24  1823   WBC Thousand/uL 5.31 6.95   HEMOGLOBIN g/dL 13.8 13.4   HEMATOCRIT % 41.9 39.9   PLATELETS Thousands/uL 266 284     Results from last 7 days   Lab Units 07/25/24  0616 07/24/24  1823   POTASSIUM mmol/L 4.3 3.7   CHLORIDE mmol/L 107 105   CO2 mmol/L 27 24   BUN mg/dL 8 8   CREATININE mg/dL 0.62 0.79   CALCIUM mg/dL 9.0 8.6         Results from last 7 days   Lab Units 07/25/24  0616   MAGNESIUM mg/dL 2.1

## 2024-07-25 NOTE — ASSESSMENT & PLAN NOTE
PMHx of MI 2014, S/p cardiac cath, 100% stenosis OM, 95% stenosis mid circumflex, s/p JESUS MANUEL x2  Pt last admitted in 2019 for chest pain with cardiac cath performed, no intervention required  At the time: patent circumflex. unchanged moderate nonobstructive distal circumflex (diffuse 50% stenosis) and proximal RCA disease (diffuse 60% stenosis), 40% stenosis distal RCA   Pt determined to have pericarditis and treated for this at that time  Pt reports compliance with aspirin, statin, and BB therapy although he has not been seen since 11/2019. No follow-up after discharge.

## 2024-07-25 NOTE — ED PROVIDER NOTES
History  Chief Complaint   Patient presents with    Chest Pain     Pt reports intermittent chest pain that started a month ago but over the last week has been constant. Pt describes pain as pressure that comes on strong with numbness to L arm,SOB, dizziness     39-year-old male with history of multiple cardiac stents presents for evaluation of worsening substernal chest pain.  Patient states that for several weeks he gets intermittent sharp pressure substernal chest pain with associated diaphoresis and shortness of breath.  It resolves after rest.  He states that it has been increasing in frequency and intensity.  Patient currently asymptomatic.  He states that he was in the [place] and was diagnosed with a heart blockage which he is unable to further describe.  Also reports he was diagnosed with Giardia and started on medication for this but does not know the medication.      History provided by:  Patient and relative   used: Yes    Chest Pain  Associated symptoms: diaphoresis and shortness of breath    Associated symptoms: no abdominal pain, no fatigue, no fever, no nausea, no numbness, no palpitations, not vomiting and no weakness        Prior to Admission Medications   Prescriptions Last Dose Informant Patient Reported? Taking?   acetaminophen (TYLENOL) 325 mg tablet   No No   Si mg q.6 hours p.r.n. Pain/fever   Patient not taking: Reported on 11/3/2019   aspirin (ECOTRIN LOW STRENGTH) 81 mg EC tablet   No No   Sig: Take 1 tablet (81 mg total) by mouth daily   atorvastatin (LIPITOR) 40 mg tablet   No No   Sig: Take 1 tablet (40 mg total) by mouth daily with dinner   colchicine (COLCRYS) 0.6 mg tablet   No No   Sig: Take 1 tablet (0.6 mg total) by mouth 2 (two) times a day   enalapril (VASOTEC) 2.5 mg tablet   No No   Sig: Take 1 tablet (2.5 mg total) by mouth daily   ezetimibe (ZETIA) 10 mg tablet   Yes No   Sig: Take 10 mg by mouth daily   glipiZIDE (GLUCOTROL XL) 5 mg 24 hr  tablet   No No   Sig: Take 1 tablet (5 mg total) by mouth daily   metFORMIN (GLUCOPHAGE) 1000 MG tablet   No No   Sig: Take 1 tablet (1,000 mg total) by mouth 2 (two) times a day with meals   metoprolol succinate (TOPROL-XL) 50 mg 24 hr tablet   No No   Sig: Take 1 tablet (50 mg total) by mouth daily   pantoprazole (PROTONIX) 40 mg tablet   No No   Sig: Take 1 tablet (40 mg total) by mouth daily in the early morning   Patient not taking: Reported on 10/1/2021      Facility-Administered Medications: None       Past Medical History:   Diagnosis Date    Cardiac disease     Diabetes mellitus (HCC)     Hypertension        Past Surgical History:   Procedure Laterality Date    CARDIAC CATHETERIZATION      CORONARY ANGIOPLASTY WITH STENT PLACEMENT         History reviewed. No pertinent family history.  I have reviewed and agree with the history as documented.    E-Cigarette/Vaping     E-Cigarette/Vaping Substances     Social History     Tobacco Use    Smoking status: Never    Smokeless tobacco: Never   Substance Use Topics    Alcohol use: Yes     Comment: socailly     Drug use: No       Review of Systems   Constitutional:  Positive for diaphoresis. Negative for activity change, appetite change, fatigue and fever.   HENT:  Negative for congestion, dental problem, ear pain, rhinorrhea and sore throat.    Eyes:  Negative for pain and redness.   Respiratory:  Positive for shortness of breath. Negative for chest tightness and wheezing.    Cardiovascular:  Positive for chest pain. Negative for palpitations.   Gastrointestinal:  Negative for abdominal pain, blood in stool, constipation, diarrhea, nausea and vomiting.   Endocrine: Negative for cold intolerance and heat intolerance.   Genitourinary:  Negative for dysuria, frequency and hematuria.   Musculoskeletal:  Negative for arthralgias and myalgias.   Skin:  Negative for color change, pallor and rash.   Neurological:  Negative for weakness and numbness.   Hematological:  Does  not bruise/bleed easily.   Psychiatric/Behavioral:  Negative for agitation, hallucinations and suicidal ideas.        Physical Exam  Physical Exam  Constitutional:       Appearance: He is well-developed.   HENT:      Head: Normocephalic and atraumatic.   Eyes:      General: No scleral icterus.     Conjunctiva/sclera: Conjunctivae normal.   Neck:      Vascular: No JVD.      Trachea: No tracheal deviation.   Cardiovascular:      Rate and Rhythm: Normal rate and regular rhythm.   Pulmonary:      Effort: Pulmonary effort is normal. No respiratory distress.      Breath sounds: Normal breath sounds.   Chest:      Chest wall: No tenderness.   Abdominal:      General: There is no distension.   Musculoskeletal:         General: No deformity. Normal range of motion.      Cervical back: Normal range of motion.   Skin:     Coloration: Skin is not pale.      Findings: No erythema or rash.   Neurological:      Mental Status: He is alert and oriented to person, place, and time.   Psychiatric:         Behavior: Behavior normal.         Vital Signs  ED Triage Vitals   Temperature Pulse Respirations Blood Pressure SpO2   07/24/24 1820 07/24/24 1814 07/24/24 1814 07/24/24 1814 07/24/24 1814   98.1 °F (36.7 °C) 87 20 109/67 97 %      Temp Source Heart Rate Source Patient Position - Orthostatic VS BP Location FiO2 (%)   07/24/24 1820 07/24/24 1814 07/24/24 1814 07/24/24 1814 --   Oral Monitor Sitting Left arm       Pain Score       07/24/24 1930       4           Vitals:    07/24/24 1814 07/24/24 2000   BP: 109/67 124/71   Pulse: 87 72   Patient Position - Orthostatic VS: Sitting Lying         Visual Acuity      ED Medications  Medications   aspirin chewable tablet 324 mg (324 mg Oral Given 7/24/24 1851)       Diagnostic Studies  Results Reviewed       Procedure Component Value Units Date/Time    HS Troponin I 4hr [[id number]]     Lab Status: No result Specimen: Blood     Giardia antigen [[id number]]     Lab Status: No result Specimen:  Stool     B-Type Natriuretic Peptide(BNP) [[id number]]  (Normal) Collected: 07/24/24 1823    Lab Status: Final result Specimen: Blood from Arm, Right Updated: 07/24/24 1935     BNP 30 pg/mL     HS Troponin I 2hr [[id number]]     Lab Status: No result Specimen: Blood     HS Troponin 0hr (reflex protocol) [[id number]]  (Normal) Collected: 07/24/24 1823    Lab Status: Final result Specimen: Blood from Arm, Right Updated: 07/24/24 1905     hs TnI 0hr 6 ng/L     Basic metabolic panel [[id number]]  (Abnormal) Collected: 07/24/24 1823    Lab Status: Final result Specimen: Blood from Arm, Right Updated: 07/24/24 1847     Sodium 137 mmol/L      Potassium 3.7 mmol/L      Chloride 105 mmol/L      CO2 24 mmol/L      ANION GAP 8 mmol/L      BUN 8 mg/dL      Creatinine 0.79 mg/dL      Glucose 207 mg/dL      Calcium 8.6 mg/dL      eGFR 112 ml/min/1.73sq m     Narrative:      National Kidney Disease Foundation guidelines for Chronic Kidney Disease (CKD):     Stage 1 with normal or high GFR (GFR > 90 mL/min/1.73 square meters)    Stage 2 Mild CKD (GFR = 60-89 mL/min/1.73 square meters)    Stage 3A Moderate CKD (GFR = 45-59 mL/min/1.73 square meters)    Stage 3B Moderate CKD (GFR = 30-44 mL/min/1.73 square meters)    Stage 4 Severe CKD (GFR = 15-29 mL/min/1.73 square meters)    Stage 5 End Stage CKD (GFR <15 mL/min/1.73 square meters)  Note: GFR calculation is accurate only with a steady state creatinine    CBC and differential [[id number]] Collected: 07/24/24 1823    Lab Status: Final result Specimen: Blood from Arm, Right Updated: 07/24/24 1837     WBC 6.95 Thousand/uL      RBC 4.55 Million/uL      Hemoglobin 13.4 g/dL      Hematocrit 39.9 %      MCV 88 fL      MCH 29.5 pg      MCHC 33.6 g/dL      RDW 13.1 %      MPV 9.9 fL      Platelets 284 Thousands/uL      nRBC 0 /100 WBCs      Segmented % 61 %      Immature Grans % 2 %      Lymphocytes % 26 %      Monocytes % 8 %      Eosinophils Relative 3 %      Basophils Relative 0 %       Absolute Neutrophils 4.31 Thousands/µL      Absolute Immature Grans 0.11 Thousand/uL      Absolute Lymphocytes 1.81 Thousands/µL      Absolute Monocytes 0.53 Thousand/µL      Eosinophils Absolute 0.18 Thousand/µL      Basophils Absolute 0.01 Thousands/µL                    XR chest 2 views   ED Interpretation by Daniel Tuttle MD (07/24 1954)   Primary reviewed; No acute abnotrmality                 Procedures  ECG 12 Lead Documentation Only    Date/Time: 7/24/2024 8:00 PM    Performed by: Daniel Tuttle MD  Authorized by: Daniel Tuttle MD    ECG reviewed by me, the ED Provider: yes    Patient location:  ED  Rate:     ECG rate:  86    ECG rate assessment: normal    Rhythm:     Rhythm: sinus rhythm    Ectopy:     Ectopy: none    QRS:     QRS axis:  Normal    QRS intervals:  Normal  Conduction:     Conduction: normal    ST segments:     ST segments:  Normal  T waves:     T waves: normal             ED Course  ED Course as of 07/24/24 2039 Wed Jul 24, 2024 1940 GLUCOSE(!): 207  Not in dka               HEART Risk Score      Flowsheet Row Most Recent Value   Heart Score Risk Calculator    History 2 Filed at: 07/24/2024 2033   ECG 0 Filed at: 07/24/2024 2033   Age 0 Filed at: 07/24/2024 2033   Risk Factors 2 Filed at: 07/24/2024 2033   Troponin 0 Filed at: 07/24/2024 2033   HEART Score 4 Filed at: 07/24/2024 2033                          SBIRT 20yo+      Flowsheet Row Most Recent Value   Initial Alcohol Screen: US AUDIT-C     1. How often do you have a drink containing alcohol? 0 Filed at: 07/24/2024 1814   2. How many drinks containing alcohol do you have on a typical day you are drinking?  0 Filed at: 07/24/2024 1814   3a. Male UNDER 65: How often do you have five or more drinks on one occasion? 0 Filed at: 07/24/2024 1814   Audit-C Score 0 Filed at: 07/24/2024 1814   DIANNA: How many times in the past year have you...    Used an illegal drug or used a prescription medication for non-medical reasons? Never  Filed at: 07/24/2024 1814                      Medical Decision Making  Chest pain without history exam findings that suggest pulmonary embolism and medical workup for this is not indicated.  Will do cardiac workup to rule out ACS, pneumonia, pneumothorax, congestive heart failure, dysrhythmia, mild/pericarditis.  Aspirin, admit.    Amount and/or Complexity of Data Reviewed  Labs: ordered. Decision-making details documented in ED Course.  Radiology: ordered and independent interpretation performed.    Risk  OTC drugs.  Decision regarding hospitalization.                 Disposition  Final diagnoses:   Chest pain, rule out acute myocardial infarction   Coronary artery disease     Time reflects when diagnosis was documented in both MDM as applicable and the Disposition within this note       Time User Action Codes Description Comment    7/24/2024  8:38 PM Daniel Tuttle Add [R07.9] Chest pain, rule out acute myocardial infarction     7/24/2024  8:39 PM Daniel Tuttle Add [I25.10] Coronary artery disease           ED Disposition       ED Disposition   Admit    Condition   Stable    Date/Time   Wed Jul 24, 2024 1941    Comment                  Follow-up Information    None         Patient's Medications   Discharge Prescriptions    No medications on file       No discharge procedures on file.    PDMP Review       None            ED Provider  Electronically Signed by             Daniel Tuttle MD  07/24/24 2039

## 2024-07-25 NOTE — H&P
[facility]  H&P  Name: Jaleel Leblanc 39 y.o. male I MRN: [id number]  Unit/Bed#: E4 -01 I Date of Admission: 7/24/2024   Date of Service: 7/25/2024 I Hospital Day: 0      Assessment & Plan   * Exertional chest pain  Assessment & Plan  Patient with PMHx of CAD, HTN, HLD, DM2, and pericarditis presented to the ED secondary to ongoing exertional chest pain/dyspnea x one month, worsened over the last week.   Pt reports he has noted that with minimal exertion, he will suddenly develop chest pain, SOB, diaphoresis, and dizziness improved with rest. He reports bilateral pedal edema following an episode last week of this chest pain, now resolved. He denies N/V.  Pt states he episodes became significantly worse last week prior to a planned trip to the [place]. Pt states despite worsening symptoms, he travelled anyway and was soon hospitalized there. Pt and family report he had cardiac workup and ECHO, possibly abnormal? and discharged home. Pt reports that since returning home, his symptoms have persisted prompting him to seek care.   Pt reports compliance with home medications although no documented visits since 2021  EKG without acute ischemic changes, similar to previous  Initial trop 6, continue to trend  NICK score 4  Last ECHO 11/2019 EF 60%  BNP 30  New ECHO pending   Monitor on telemetry  Lipid panel, hgba1c pending   Cardiology consult pending     CAD (coronary artery disease)  Assessment & Plan  PMHx of MI 2014, S/p cardiac cath, 100% stenosis OM, 95% stenosis mid circumflex, s/p JESUS MANUEL x2  Pt last admitted in 2019 for chest pain with cardiac cath performed, no intervention required  At the time: patent circumflex. unchanged moderate nonobstructive distal circumflex (diffuse 50% stenosis) and proximal RCA disease (diffuse 60% stenosis), 40% stenosis distal RCA   Pt determined to have pericarditis and treated for this at that time  Pt reports compliance with aspirin,  "statin, and BB therapy although he has not been seen since 11/2019. No follow-up after discharge.    History of diarrhea  Assessment & Plan  Pt reports diagnosed with Giardia while in the [place]   He reports he was having diarrhea which is why he was tested, he states his diarrhea is now resolved  He denies any fevers or chills. WBC 6.95, afebrile.  He states currently on antibiotic regimen of 500 mg metronidazole + possibly cipro?  Family reports will bring in antibiotics for confirmation  Stool cultures pending   Continue with metronidazole for now     Type 2 diabetes mellitus with hyperglycemia, without long-term current use of insulin ([facility])  Assessment & Plan  Lab Results   Component Value Date    HGBA1C 9.3 (H) 11/04/2019       No results for input(s): \"POCGLU\" in the last 72 hours.    Blood Sugar Average: Last 72 hrs:  Pt reports taking metformin at home   Hgba1c pending   SSI with accucheks      Essential hypertension  Assessment & Plan  Continue lisinopril and metoprolol    Hypercholesterolemia  Assessment & Plan  Continue statin and zetia         VTE Pharmacologic Prophylaxis: VTE Score: 3 Moderate Risk (Score 3-4) - Pharmacological DVT Prophylaxis Ordered: enoxaparin (Lovenox).  Code Status: Level 1 - Full Code   Discussion with family: Updated  (wife and daughter) at bedside.    Anticipated Length of Stay: Patient will be admitted on an observation basis with an anticipated length of stay of less than 2 midnights secondary to chest pain.    Total Time Spent on Date of Encounter in care of patient: 75 mins. This time was spent on one or more of the following: performing physical exam; counseling and coordination of care; obtaining or reviewing history; documenting in the medical record; reviewing/ordering tests, medications or procedures; communicating with other healthcare professionals and discussing with patient's family/caregivers.    Chief Complaint: \"I have chest pain and " "SOB with exertion\"    History of Present Illness:  Jaleel Leblanc is a 39 y.o. male who presents with exertional chest pain and dyspnea.  Patient reports that for the last month he has noted that he has been having episodes of exertional chest pain, shortness of breath, dizziness, and diaphoresis.  He reports that they have been increasing in intensity and frequency since initiating and have been specifically worse over the last week.  Patient reports that he only has to go a short distance before becoming symptomatic.  He reports that the symptoms are resolved with rest and he denies having any symptoms currently.  Patient reports that at the beginning of the week his symptoms were especially bad however he was planned on going to a trip to the [place] for a month.  He decided to go to the [place] anyways despite his worsening symptoms and required admission while there.  He reports that while in the hospital he had a cardiac workup and an echo.  Patient and family report that on the echo there was \"heart clots?\"and was abnormal.  It was discharged from the hospital and returned home. Pt does endorse having bilateral pedal edema last week, however now resolved.  Patient's symptoms continue to persist prompting him to come to the emergency department for evaluation this evening. Pt denies any palpitations, N/V or back pain. While in the hospital, patient also reported diarrhea, now resolved. He states he was diagnosed with Giardia and started on flagyl tid and possibly possibly cipro? Patient family to bring in antibiotics tomorrow.      Review of Systems:  Review of Systems   Constitutional:  Positive for diaphoresis (exertional). Negative for appetite change, chills, fatigue and fever.   HENT:  Negative for congestion, rhinorrhea and sore throat.    Eyes:  Negative for photophobia and visual disturbance.   Respiratory:  Positive for shortness of breath (exertional). Negative for cough and " wheezing.    Cardiovascular:  Positive for chest pain (exertional). Negative for palpitations and leg swelling.   Gastrointestinal:  Positive for diarrhea (about a week ago, now resolved). Negative for abdominal distention, abdominal pain, blood in stool, constipation, nausea and vomiting.   Genitourinary:  Negative for dysuria and hematuria.   Musculoskeletal:  Negative for arthralgias, back pain, myalgias and neck stiffness.   Skin:  Negative for color change and rash.   Neurological:  Positive for dizziness. Negative for tremors, seizures, syncope, facial asymmetry, speech difficulty, weakness, light-headedness, numbness and headaches.   Psychiatric/Behavioral:  Negative for agitation, behavioral problems and confusion. The patient is not nervous/anxious.    All other systems reviewed and are negative.      Past Medical and Surgical History:   Past Medical History:   Diagnosis Date    Cardiac disease     Diabetes mellitus (HCC)     Hypertension        Past Surgical History:   Procedure Laterality Date    CARDIAC CATHETERIZATION      CORONARY ANGIOPLASTY WITH STENT PLACEMENT         Meds/Allergies:  Prior to Admission medications    Medication Sig Start Date End Date Taking? Authorizing Provider   acetaminophen (TYLENOL) 325 mg tablet 650 mg q.6 hours p.r.n. Pain/fever  Patient not taking: Reported on 11/3/2019 11/26/18   Colby Lang MD   aspirin (ECOTRIN LOW STRENGTH) 81 mg EC tablet Take 1 tablet (81 mg total) by mouth daily 11/16/19   Viola Lopez PA-C   atorvastatin (LIPITOR) 40 mg tablet Take 1 tablet (40 mg total) by mouth daily with dinner 11/5/19   Viola Lopez PA-C   colchicine (COLCRYS) 0.6 mg tablet Take 1 tablet (0.6 mg total) by mouth 2 (two) times a day 11/5/19 12/5/19  Viola Lopez PA-C   enalapril (VASOTEC) 2.5 mg tablet Take 1 tablet (2.5 mg total) by mouth daily 11/5/19   Viola Lopez PA-C   ezetimibe (ZETIA) 10 mg tablet Take 10 mg by mouth daily    Historical Provider, MD    glipiZIDE (GLUCOTROL XL) 5 mg 24 hr tablet Take 1 tablet (5 mg total) by mouth daily 11/5/19   Viola Lopez PA-C   metFORMIN (GLUCOPHAGE) 1000 MG tablet Take 1 tablet (1,000 mg total) by mouth 2 (two) times a day with meals 11/5/19   Viola Lopez PA-C   metoprolol succinate (TOPROL-XL) 50 mg 24 hr tablet Take 1 tablet (50 mg total) by mouth daily 11/5/19   Viola Lopez PA-C   pantoprazole (PROTONIX) 40 mg tablet Take 1 tablet (40 mg total) by mouth daily in the early morning  Patient not taking: Reported on 10/1/2021 11/6/19   Viola Lopez PA-C     I have reviewed home medications with patient personally.    Allergies: No Known Allergies    Social History:  Marital Status: Single   Patient Pre-hospital Living Situation: Home  Patient Pre-hospital Level of Mobility: walks  Patient Pre-hospital Diet Restrictions: diabetic, cardiac  Substance Use History:   Social History     Substance and Sexual Activity   Alcohol Use Yes    Comment: socailly      Social History     Tobacco Use   Smoking Status Never   Smokeless Tobacco Never     Social History     Substance and Sexual Activity   Drug Use No       Family History:  History reviewed. No pertinent family history.    Physical Exam:     Vitals:   Blood Pressure: 111/57 (07/24/24 2350)  Pulse: 79 (07/24/24 2350)  Temperature: (!) 97.3 °F (36.3 °C) (07/24/24 2350)  Temp Source: Temporal (07/24/24 2350)  Respirations: 18 (07/24/24 2350)  Weight - Scale: 108 kg (238 lb 12.1 oz) (07/24/24 1814)  SpO2: 95 % (07/24/24 2350)    Physical Exam  Vitals and nursing note reviewed.   Constitutional:       General: He is not in acute distress.     Appearance: He is well-developed.   HENT:      Head: Normocephalic and atraumatic.      Nose: Nose normal. No congestion.      Mouth/Throat:      Mouth: Mucous membranes are moist.      Pharynx: Oropharynx is clear.   Eyes:      Conjunctiva/sclera: Conjunctivae normal.   Cardiovascular:      Rate and Rhythm: Normal rate and  regular rhythm.      Heart sounds: Normal heart sounds. No murmur heard.     No friction rub. No gallop.   Pulmonary:      Effort: Pulmonary effort is normal. No respiratory distress.      Breath sounds: Normal breath sounds. No wheezing, rhonchi or rales.   Abdominal:      General: Bowel sounds are normal. There is no distension.      Palpations: Abdomen is soft.      Tenderness: There is no abdominal tenderness.   Musculoskeletal:      Cervical back: Neck supple.      Right lower leg: No edema.      Left lower leg: No edema.   Skin:     General: Skin is warm and dry.      Capillary Refill: Capillary refill takes less than 2 seconds.   Neurological:      General: No focal deficit present.      Mental Status: He is alert and oriented to person, place, and time. Mental status is at baseline.   Psychiatric:         Mood and Affect: Mood normal.         Behavior: Behavior normal.          Additional Data:     Lab Results:  Results from last 7 days   Lab Units 07/24/24  1823   WBC Thousand/uL 6.95   HEMOGLOBIN g/dL 13.4   HEMATOCRIT % 39.9   PLATELETS Thousands/uL 284   SEGS PCT % 61   LYMPHO PCT % 26   MONO PCT % 8   EOS PCT % 3     Results from last 7 days   Lab Units 07/24/24  1823   SODIUM mmol/L 137   POTASSIUM mmol/L 3.7   CHLORIDE mmol/L 105   CO2 mmol/L 24   BUN mg/dL 8   CREATININE mg/dL 0.79   ANION GAP mmol/L 8   CALCIUM mg/dL 8.6   GLUCOSE RANDOM mg/dL 207*         Results from last 7 days   Lab Units 07/24/24  2238   POC GLUCOSE mg/dl 97     Lab Results   Component Value Date    HGBA1C 9.3 (H) 11/04/2019    HGBA1C 8.3 (H) 11/26/2018    HGBA1C 6.7 (H) 03/01/2014           Lines/Drains:  Invasive Devices       Peripheral Intravenous Line  Duration             Peripheral IV 07/24/24 Distal;Left;Upper;Ventral (anterior) Arm <1 day                        Imaging: Personally reviewed the following imaging: chest xray  XR chest 2 views   ED Interpretation by Daniel Tuttle MD (07/24 1954)   Primary reviewed; No  acute abnotrmality      Final Result by Shantal Paredes MD (07/24 2157)      No acute cardiopulmonary disease.            Workstation performed: [id number]             EKG and Other Studies Reviewed on Admission:   EKG: NSR. HR 86.    ** Please Note: This note has been constructed using a voice recognition system. **

## 2024-07-25 NOTE — ASSESSMENT & PLAN NOTE
Patient with PMHx of CAD, HTN, HLD, DM2, and pericarditis presented to the ED secondary to ongoing exertional chest pain/dyspnea x one month, worsened over the last week.   Pt reports he has noted that with minimal exertion, he will suddenly develop chest pain, SOB, diaphoresis, and dizziness improved with rest. He reports bilateral pedal edema following an episode last week of this chest pain, now resolved. He denies N/V.  Pt states he episodes became significantly worse last week prior to a planned trip to the [place]. Pt states despite worsening symptoms, he travelled anyway and was soon hospitalized there. Pt and family report he had cardiac workup and ECHO, possibly abnormal? and discharged home. Pt reports that since returning home, his symptoms have persisted prompting him to seek care.   Pt reports compliance with home medications although no documented visits since 2021  EKG without acute ischemic changes, similar to previous  Initial trop 6, continue to trend  NICK score 4  Last ECHO 11/2019 EF 60%  BNP 30  New ECHO pending   Monitor on telemetry  Lipid panel, hgba1c pending   Cardiology consult pending

## 2024-07-25 NOTE — ASSESSMENT & PLAN NOTE
Pt reports diagnosed with Giardia while in the [place]   He reports he was having diarrhea which is why he was tested, he states his diarrhea is now resolved  He denies any fevers or chills. WBC 6.95, afebrile.  He states currently on antibiotic regimen of 500 mg metronidazole + possibly cipro?  Family reports will bring in antibiotics for confirmation  Stool cultures pending   Continue with metronidazole for now

## 2024-07-25 NOTE — PROGRESS NOTES
[facility]  Progress Note  Name: Jaleel Leblanc I  MRN: [id number]  Unit/Bed#: E4 -01 I Date of Admission: 2024   Date of Service: 2024 I Hospital Day: 0    Assessment & Plan   * Exertional chest pain  Assessment & Plan  Secondary to stable angina/CAD.  Currently  chest pain-free  For catheterization tomorrow  Continue aspirin, Lipitor Toprol-XL  Hold lisinopril tomorrow    History of diarrhea  Assessment & Plan  Reportedly diagnosed with Giardia infection in the [place]  Continue Flagyl as prescribed     Type 2 diabetes mellitus with hyperglycemia, without long-term current use of insulin ([facility])  Assessment & Plan  Lab Results   Component Value Date    HGBA1C 7.4 (H) 2024       Recent Labs     24  2238 24  0755 24  1124 24  1605   POCGLU 97 135 178* 120     A1c is close to goal.    Continue sliding scale insulin  Resume metformin on discharge      Essential hypertension  Assessment & Plan  Continue Toprol XL 50 mg daily  Hold lisinopril dose tomorrow in anticipation for cardiac cath    Hypercholesterolemia  Assessment & Plan  Continue Zetia 10 mg daily and Lipitor 40 mg daily           VTE Pharmacologic Prophylaxis: VTE Score: 3 Moderate Risk (Score 3-4) - Pharmacological DVT Prophylaxis Ordered: enoxaparin (Lovenox).      Discussions with Specialists or Other Care Team Provider: Discussed with Dr. Griffin and Darya    Current Length of Stay: 0 day(s)  Current Patient Status: Observation   Certification Statement: The patient will continue to require additional inpatient hospital stay due to cardiac cath  Discharge Plan: Anticipate discharge in 24-48 hrs to home.    Code Status: Level 1 - Full Code    Subjective:   Seen and examined during rounds  Denies chest pain  No diarrhea or abdominal pain    Objective:     Vitals:   Temp (24hrs), Av.8 °F (36.6 °C), Min:97.3 °F (36.3 °C), Max:98.1 °F (36.7 °C)    Temp:  [97.3 °F (36.3  °C)-98.1 °F (36.7 °C)] 98 °F (36.7 °C)  HR:  [67-87] 70  Resp:  [18-20] 18  BP: ()/(54-81) 120/75  SpO2:  [94 %-97 %] 96 %  Body mass index is 39.61 kg/m².     Input and Output Summary (last 24 hours):     Intake/Output Summary (Last 24 hours) at 7/25/2024 1716  Last data filed at 7/25/2024 0917  Gross per 24 hour   Intake 180 ml   Output --   Net 180 ml       Physical Exam:   Physical Exam  Vitals reviewed.   Constitutional:       Appearance: He is obese.   HENT:      Head: Normocephalic and atraumatic.      Nose: No congestion or rhinorrhea.   Eyes:      General: No scleral icterus.  Cardiovascular:      Rate and Rhythm: Normal rate and regular rhythm.   Pulmonary:      Breath sounds: No wheezing, rhonchi or rales.   Abdominal:      Palpations: Abdomen is soft.      Tenderness: There is no abdominal tenderness. There is no guarding.   Musculoskeletal:      Right lower leg: No edema.      Left lower leg: No edema.   Skin:     General: Skin is warm and dry.   Neurological:      Comments: Awake alert cooperative   Psychiatric:         Mood and Affect: Mood normal.         Behavior: Behavior normal.          Additional Data:     Labs:  Results from last 7 days   Lab Units 07/25/24  0616   WBC Thousand/uL 5.31   HEMOGLOBIN g/dL 13.8   HEMATOCRIT % 41.9   PLATELETS Thousands/uL 266   SEGS PCT % 56   LYMPHO PCT % 33   MONO PCT % 8   EOS PCT % 2     Results from last 7 days   Lab Units 07/25/24  0616   SODIUM mmol/L 140   POTASSIUM mmol/L 4.3   CHLORIDE mmol/L 107   CO2 mmol/L 27   BUN mg/dL 8   CREATININE mg/dL 0.62   ANION GAP mmol/L 6   CALCIUM mg/dL 9.0   GLUCOSE RANDOM mg/dL 124         Results from last 7 days   Lab Units 07/25/24  1605 07/25/24  1124 07/25/24  0755 07/24/24  2238   POC GLUCOSE mg/dl 120 178* 135 97     Results from last 7 days   Lab Units 07/25/24  0616   HEMOGLOBIN A1C % 7.4*           Lines/Drains:  Invasive Devices       Peripheral Intravenous Line  Duration             Peripheral IV  07/24/24 Distal;Left;Upper;Ventral (anterior) Arm <1 day                      Telemetry:  Telemetry Orders (From admission, onward)               24 Hour Telemetry Monitoring  Continuous x 24 Hours (Telem)        Expiring   Question:  Reason for 24 Hour Telemetry  Answer:  PCI/EP study (including pacer and ICD implementation), Cardiac surgery, MI, abnormal cardiac cath, and chest pain- rule out MI                            Imaging: Reviewed radiology reports from this admission including: ECHO    Recent Cultures (last 7 days):         Last 24 Hours Medication List:   Current Facility-Administered Medications   Medication Dose Route Frequency Provider Last Rate    aspirin  81 mg Oral Daily Bernardo Menchaca PA-C      atorvastatin  40 mg Oral Daily With Dinner Bernardo Menchaca PA-C      enoxaparin  40 mg Subcutaneous Daily Bernardo Menchaca PA-C      ezetimibe  10 mg Oral Daily Bernardo Menchaca PA-C      insulin lispro  1-6 Units Subcutaneous TID AC Bernardo Menchaca PA-C      insulin lispro  1-6 Units Subcutaneous HS Bernardo Menchaca PA-C      [START ON 7/27/2024] lisinopril  5 mg Oral Daily Kraig Lackey MD      metoprolol succinate  50 mg Oral Daily Bernardo Menchaca PA-C      metroNIDAZOLE  500 mg Oral Q8H JOSEFINA Bernardo Menchaca PA-C      pantoprazole  40 mg Oral Early Morning Bernardo Menchaca PA-C          Today, Patient Was Seen By: Christiano Blakely MD    **Please Note: This note may have been constructed using a voice recognition system.**

## 2024-07-25 NOTE — ASSESSMENT & PLAN NOTE
Lab Results   Component Value Date    HGBA1C 7.4 (H) 07/25/2024       Recent Labs     07/24/24  2238 07/25/24  0755 07/25/24  1124 07/25/24  1605   POCGLU 97 135 178* 120     A1c is close to goal.    Continue sliding scale insulin  Resume metformin on discharge

## 2024-07-25 NOTE — PLAN OF CARE

## 2024-07-26 PROBLEM — I25.9 CHEST PAIN DUE TO MYOCARDIAL ISCHEMIA: Status: ACTIVE | Noted: 2024-07-24

## 2024-07-26 PROBLEM — Z95.5 STATUS POST INSERTION OF DRUG ELUTING CORONARY ARTERY STENT: Status: ACTIVE | Noted: 2024-07-26

## 2024-07-26 LAB
ANION GAP SERPL CALCULATED.3IONS-SCNC: 7 MMOL/L (ref 4–13)
BUN SERPL-MCNC: 9 MG/DL (ref 5–25)
CALCIUM SERPL-MCNC: 8.8 MG/DL (ref 8.4–10.2)
CHLORIDE SERPL-SCNC: 107 MMOL/L (ref 96–108)
CO2 SERPL-SCNC: 25 MMOL/L (ref 21–32)
CREAT SERPL-MCNC: 0.58 MG/DL (ref 0.6–1.3)
ERYTHROCYTE [DISTWIDTH] IN BLOOD BY AUTOMATED COUNT: 13.3 % (ref 11.6–15.1)
G LAMBLIA AG STL QL IA: NEGATIVE
GFR SERPL CREATININE-BSD FRML MDRD: 127 ML/MIN/1.73SQ M
GLUCOSE P FAST SERPL-MCNC: 146 MG/DL (ref 65–99)
GLUCOSE SERPL-MCNC: 128 MG/DL (ref 65–140)
GLUCOSE SERPL-MCNC: 137 MG/DL (ref 65–140)
GLUCOSE SERPL-MCNC: 139 MG/DL (ref 65–140)
GLUCOSE SERPL-MCNC: 146 MG/DL (ref 65–140)
GLUCOSE SERPL-MCNC: 161 MG/DL (ref 65–140)
HCT VFR BLD AUTO: 41.4 % (ref 36.5–49.3)
HGB BLD-MCNC: 13.7 G/DL (ref 12–17)
INR PPP: 1.05 (ref 0.84–1.19)
KCT BLD-ACNC: 347 SEC (ref 89–137)
MCH RBC QN AUTO: 30 PG (ref 26.8–34.3)
MCHC RBC AUTO-ENTMCNC: 33.1 G/DL (ref 31.4–37.4)
MCV RBC AUTO: 91 FL (ref 82–98)
PLATELET # BLD AUTO: 253 THOUSANDS/UL (ref 149–390)
PMV BLD AUTO: 10.1 FL (ref 8.9–12.7)
POTASSIUM SERPL-SCNC: 3.8 MMOL/L (ref 3.5–5.3)
PROTHROMBIN TIME: 13.9 SECONDS (ref 11.6–14.5)
RBC # BLD AUTO: 4.56 MILLION/UL (ref 3.88–5.62)
SODIUM SERPL-SCNC: 139 MMOL/L (ref 135–147)
SPECIMEN SOURCE: ABNORMAL
WBC # BLD AUTO: 5.97 THOUSAND/UL (ref 4.31–10.16)

## 2024-07-26 PROCEDURE — 85347 COAGULATION TIME ACTIVATED: CPT

## 2024-07-26 PROCEDURE — 99153 MOD SED SAME PHYS/QHP EA: CPT | Performed by: INTERNAL MEDICINE

## 2024-07-26 PROCEDURE — C1769 GUIDE WIRE: HCPCS | Performed by: INTERNAL MEDICINE

## 2024-07-26 PROCEDURE — NC001 PR NO CHARGE: Performed by: INTERNAL MEDICINE

## 2024-07-26 PROCEDURE — 82948 REAGENT STRIP/BLOOD GLUCOSE: CPT

## 2024-07-26 PROCEDURE — 99152 MOD SED SAME PHYS/QHP 5/>YRS: CPT | Performed by: INTERNAL MEDICINE

## 2024-07-26 PROCEDURE — 92928 PRQ TCAT PLMT NTRAC ST 1 LES: CPT | Performed by: INTERNAL MEDICINE

## 2024-07-26 PROCEDURE — 80048 BASIC METABOLIC PNL TOTAL CA: CPT | Performed by: STUDENT IN AN ORGANIZED HEALTH CARE EDUCATION/TRAINING PROGRAM

## 2024-07-26 PROCEDURE — 027035Z DILATION OF CORONARY ARTERY, ONE ARTERY WITH TWO DRUG-ELUTING INTRALUMINAL DEVICES, PERCUTANEOUS APPROACH: ICD-10-PCS | Performed by: INTERNAL MEDICINE

## 2024-07-26 PROCEDURE — C9600 PERC DRUG-EL COR STENT SING: HCPCS | Performed by: INTERNAL MEDICINE

## 2024-07-26 PROCEDURE — 93454 CORONARY ARTERY ANGIO S&I: CPT | Performed by: INTERNAL MEDICINE

## 2024-07-26 PROCEDURE — 85610 PROTHROMBIN TIME: CPT | Performed by: STUDENT IN AN ORGANIZED HEALTH CARE EDUCATION/TRAINING PROGRAM

## 2024-07-26 PROCEDURE — C1725 CATH, TRANSLUMIN NON-LASER: HCPCS | Performed by: INTERNAL MEDICINE

## 2024-07-26 PROCEDURE — B2111ZZ FLUOROSCOPY OF MULTIPLE CORONARY ARTERIES USING LOW OSMOLAR CONTRAST: ICD-10-PCS | Performed by: INTERNAL MEDICINE

## 2024-07-26 PROCEDURE — C1887 CATHETER, GUIDING: HCPCS | Performed by: INTERNAL MEDICINE

## 2024-07-26 PROCEDURE — C1874 STENT, COATED/COV W/DEL SYS: HCPCS | Performed by: INTERNAL MEDICINE

## 2024-07-26 PROCEDURE — 85027 COMPLETE CBC AUTOMATED: CPT | Performed by: STUDENT IN AN ORGANIZED HEALTH CARE EDUCATION/TRAINING PROGRAM

## 2024-07-26 PROCEDURE — C1894 INTRO/SHEATH, NON-LASER: HCPCS | Performed by: INTERNAL MEDICINE

## 2024-07-26 PROCEDURE — 99232 SBSQ HOSP IP/OBS MODERATE 35: CPT | Performed by: INTERNAL MEDICINE

## 2024-07-26 DEVICE — XIENCE SKYPOINT™ EVEROLIMUS ELUTING CORONARY STENT SYSTEM 3.25 MM X 12 MM / RAPID-EXCHANGE
Type: IMPLANTABLE DEVICE | Status: FUNCTIONAL
Brand: XIENCE SKYPOINT™

## 2024-07-26 DEVICE — STENT ONYXNG22530UX ONYX 2.25X30RX
Type: IMPLANTABLE DEVICE | Status: FUNCTIONAL
Brand: ONYX FRONTIER™

## 2024-07-26 RX ORDER — SODIUM CHLORIDE 9 MG/ML
100 INJECTION, SOLUTION INTRAVENOUS CONTINUOUS
Status: DISPENSED | OUTPATIENT
Start: 2024-07-26 | End: 2024-07-27

## 2024-07-26 RX ORDER — NITROGLYCERIN 20 MG/100ML
INJECTION INTRAVENOUS CODE/TRAUMA/SEDATION MEDICATION
Status: DISCONTINUED | OUTPATIENT
Start: 2024-07-26 | End: 2024-07-26 | Stop reason: HOSPADM

## 2024-07-26 RX ORDER — SODIUM CHLORIDE 9 MG/ML
100 INJECTION, SOLUTION INTRAVENOUS CONTINUOUS
Status: DISCONTINUED | OUTPATIENT
Start: 2024-07-26 | End: 2024-07-26

## 2024-07-26 RX ORDER — ATORVASTATIN CALCIUM 80 MG/1
80 TABLET, FILM COATED ORAL
Status: DISCONTINUED | OUTPATIENT
Start: 2024-07-26 | End: 2024-07-29 | Stop reason: HOSPADM

## 2024-07-26 RX ORDER — HEPARIN SODIUM 1000 [USP'U]/ML
INJECTION, SOLUTION INTRAVENOUS; SUBCUTANEOUS CODE/TRAUMA/SEDATION MEDICATION
Status: DISCONTINUED | OUTPATIENT
Start: 2024-07-26 | End: 2024-07-26 | Stop reason: HOSPADM

## 2024-07-26 RX ORDER — FENTANYL CITRATE 50 UG/ML
INJECTION, SOLUTION INTRAMUSCULAR; INTRAVENOUS CODE/TRAUMA/SEDATION MEDICATION
Status: DISCONTINUED | OUTPATIENT
Start: 2024-07-26 | End: 2024-07-26 | Stop reason: HOSPADM

## 2024-07-26 RX ORDER — SODIUM CHLORIDE 9 MG/ML
75 INJECTION, SOLUTION INTRAVENOUS CONTINUOUS
Status: DISCONTINUED | OUTPATIENT
Start: 2024-07-26 | End: 2024-07-26

## 2024-07-26 RX ORDER — MIDAZOLAM HYDROCHLORIDE 2 MG/2ML
INJECTION, SOLUTION INTRAMUSCULAR; INTRAVENOUS CODE/TRAUMA/SEDATION MEDICATION
Status: DISCONTINUED | OUTPATIENT
Start: 2024-07-26 | End: 2024-07-26 | Stop reason: HOSPADM

## 2024-07-26 RX ORDER — VERAPAMIL HYDROCHLORIDE 2.5 MG/ML
INJECTION, SOLUTION INTRAVENOUS CODE/TRAUMA/SEDATION MEDICATION
Status: DISCONTINUED | OUTPATIENT
Start: 2024-07-26 | End: 2024-07-26 | Stop reason: HOSPADM

## 2024-07-26 RX ADMIN — ASPIRIN 81 MG: 81 TABLET, COATED ORAL at 10:47

## 2024-07-26 RX ADMIN — SODIUM CHLORIDE 100 ML/HR: 0.9 INJECTION, SOLUTION INTRAVENOUS at 13:56

## 2024-07-26 RX ADMIN — ENOXAPARIN SODIUM 40 MG: 40 INJECTION SUBCUTANEOUS at 10:47

## 2024-07-26 RX ADMIN — ATORVASTATIN CALCIUM 80 MG: 80 TABLET, FILM COATED ORAL at 17:15

## 2024-07-26 RX ADMIN — PANTOPRAZOLE SODIUM 40 MG: 40 TABLET, DELAYED RELEASE ORAL at 05:17

## 2024-07-26 RX ADMIN — METRONIDAZOLE 500 MG: 500 TABLET ORAL at 21:40

## 2024-07-26 RX ADMIN — TICAGRELOR 90 MG: 90 TABLET ORAL at 21:40

## 2024-07-26 RX ADMIN — EZETIMIBE 10 MG: 10 TABLET ORAL at 10:47

## 2024-07-26 RX ADMIN — INSULIN LISPRO 1 UNITS: 100 INJECTION, SOLUTION INTRAVENOUS; SUBCUTANEOUS at 21:40

## 2024-07-26 RX ADMIN — METRONIDAZOLE 500 MG: 500 TABLET ORAL at 05:18

## 2024-07-26 RX ADMIN — METRONIDAZOLE 500 MG: 500 TABLET ORAL at 13:56

## 2024-07-26 RX ADMIN — METOPROLOL SUCCINATE 50 MG: 50 TABLET, EXTENDED RELEASE ORAL at 10:47

## 2024-07-26 RX ADMIN — SODIUM CHLORIDE 100 ML/HR: 0.9 INJECTION, SOLUTION INTRAVENOUS at 05:15

## 2024-07-26 NOTE — PROGRESS NOTES
[facility]  Progress Note  Name: Jaleel Leblanc I  MRN: [id number]  Unit/Bed#: E4 -01 I Date of Admission: 7/24/2024   Date of Service: 7/26/2024 I Hospital Day: 0    Assessment & Plan   * Chest pain due to myocardial ischemia  Assessment & Plan  Status post cardiac cath today confirming critical focal lesion in the proximal left circumflex just prior to the previously placed stent and critical long lesion in the distal circumflex.  Both were treated with drug-eluting stents  Continue aspirin 81 mg daily, Brilinta 90 mg twice daily, metoprolol 50 mg daily, lisinopril 5 mg daily,          Zetia 10 mg daily, Lipitor 80 mg daily  Recommended weight loss  Outpatient cardiac rehab    History of diarrhea  Assessment & Plan  Reportedly diagnosed with Giardia infection in the [place]  Continue Flagyl as prescribed     Type 2 diabetes mellitus with hyperglycemia, without long-term current use of insulin ([facility])  Assessment & Plan  Lab Results   Component Value Date    HGBA1C 7.4 (H) 07/25/2024       Recent Labs     07/25/24  1605 07/25/24  2042 07/26/24  0840 07/26/24  1116   POCGLU 120 219* 137 128       A1c is close to goal.    Continue sliding scale insulin   Resume metformin on discharge      Essential hypertension  Assessment & Plan  Continue Toprol XL 50 mg daily  Resume lisinopril    Hypercholesterolemia  Assessment & Plan  Continue Lipitor 80 mg and Zetia 10 mg daily           VTE Pharmacologic Prophylaxis: VTE Score: 3 Moderate Risk (Score 3-4) - Pharmacological DVT Prophylaxis Ordered: enoxaparin (Lovenox).    Patient Centered Rounds:  Discussed with his nurse    Discussions with Specialists or Other Care Team Provider: cardiology vi EPIC chat  Education and Discussions with Family / Patient: Updated  (significant other) at bedside.  Current Length of Stay: 0 day(s)  Current Patient Status: Observation   Certification Statement: The patient, admitted on  "an observation basis, will now require > 2 midnight hospital stay due to s/p cath  Discharge Plan: Anticipate discharge tomorrow to home.    Code Status: Level 1 - Full Code    Subjective:   Seen and examined  He had a cardiac cath earlier today  He has no chest pain  He had brief shortness of breath described as \" forced breathing\" has not resolved.  I spoke to him via  at [phone]    Objective:     Vitals:   Temp (24hrs), Av.7 °F (36.5 °C), Min:97.2 °F (36.2 °C), Max:98.3 °F (36.8 °C)    Temp:  [97.2 °F (36.2 °C)-98.3 °F (36.8 °C)] 97.2 °F (36.2 °C)  HR:  [57-81] 74  Resp:  [16-18] 18  BP: ()/(52-82) 104/69  SpO2:  [93 %-98 %] 98 %  Body mass index is 39.61 kg/m².     Input and Output Summary (last 24 hours):   No intake or output data in the 24 hours ending 24 1626    Physical Exam:   Physical Exam  Vitals reviewed.   Constitutional:       Appearance: He is obese.   HENT:      Head: Normocephalic and atraumatic.      Nose: No congestion or rhinorrhea.   Eyes:      General: No scleral icterus.  Cardiovascular:      Rate and Rhythm: Normal rate and regular rhythm.   Pulmonary:      Breath sounds: No wheezing.   Abdominal:      Tenderness: There is no abdominal tenderness. There is no guarding.      Comments: Large protuberant abdomen   Musculoskeletal:      Right lower leg: No edema.      Left lower leg: No edema.   Skin:     General: Skin is warm and dry.   Neurological:      Mental Status: He is oriented to person, place, and time.   Psychiatric:         Mood and Affect: Mood normal.         Behavior: Behavior normal.     Additional Data:     Labs:  Results from last 7 days   Lab Units 24  0656 24  0616   WBC Thousand/uL 5.97 5.31   HEMOGLOBIN g/dL 13.7 13.8   HEMATOCRIT % 41.4 41.9   PLATELETS Thousands/uL 253 266   SEGS PCT %  --  56   LYMPHO PCT %  --  33   MONO PCT %  --  8   EOS PCT %  --  2     Results from last 7 days   Lab Units 24  0656   SODIUM mmol/L " 139   POTASSIUM mmol/L 3.8   CHLORIDE mmol/L 107   CO2 mmol/L 25   BUN mg/dL 9   CREATININE mg/dL 0.58*   ANION GAP mmol/L 7   CALCIUM mg/dL 8.8   GLUCOSE RANDOM mg/dL 146*     Results from last 7 days   Lab Units 07/26/24  0656   INR  1.05     Results from last 7 days   Lab Units 07/26/24  1116 07/26/24  0840 07/25/24  2042 07/25/24  1605 07/25/24  1124 07/25/24  0755 07/24/24  2238   POC GLUCOSE mg/dl 128 137 219* 120 178* 135 97     Results from last 7 days   Lab Units 07/25/24  0616   HEMOGLOBIN A1C % 7.4*           Lines/Drains:  Invasive Devices       Peripheral Intravenous Line  Duration             Peripheral IV 07/24/24 Distal;Left;Upper;Ventral (anterior) Arm 1 day                      Telemetry:  Telemetry Orders (From admission, onward)               24 Hour Telemetry Monitoring  Continuous x 24 Hours (Telem)        Question:  Reason for 24 Hour Telemetry  Answer:  PCI/EP study (including pacer and ICD implementation), Cardiac surgery, MI, abnormal cardiac cath, and chest pain- rule out MI                            Imaging: Reviewed radiology reports from this admission including: procedure reports    Recent Cultures (last 7 days):         Last 24 Hours Medication List:   Current Facility-Administered Medications   Medication Dose Route Frequency Provider Last Rate    aspirin  81 mg Oral Daily Delmy Moseleyek, LAUREEN      atorvastatin  80 mg Oral Daily With Dinner LAUREEN Benavides      enoxaparin  40 mg Subcutaneous Daily Delmy Alejandra, LAUREEN      ezetimibe  10 mg Oral Daily Delmy Alejandra, LAUREEN      insulin lispro  1-6 Units Subcutaneous TID AC LAUREEN Benavides      insulin lispro  1-6 Units Subcutaneous HS LAUREEN Benavides      [START ON 7/27/2024] lisinopril  5 mg Oral Daily LAUREEN Benavides      metoprolol succinate  50 mg Oral Daily Delmy Moseleyek, LAUREEN      metroNIDAZOLE  500 mg Oral Q8H JOSEFINA LAUREEN Benavides      pantoprazole  40 mg Oral Early Morning Delmy Alejandra,  DARIUSNP      sodium chloride  100 mL/hr Intravenous Continuous LAUREEN Benavides 100 mL/hr (07/26/24 1356)    ticagrelor  90 mg Oral Q12H JOSEFINA LAUREEN Benavides          Today, Patient Was Seen By: Christiano Blakely MD    **Please Note: This note may have been constructed using a voice recognition system.**

## 2024-07-26 NOTE — DISCHARGE INSTR - AVS FIRST PAGE
1. Please see the post angioplasty discharge instructions.   No heavy lifting, greater than 10 lbs. or strenuous activity for 5 days.  Follow angioplasty discharge instructions.    2.Remove band aid tomorrow.  Shower and wash area- wrist gently with soap and water- beginning tomorrow. Rinse and pat dry.  Apply new water seal band aid.  Repeat this process for 5 days. No powders, creams lotions or antibiotic ointments  for 5 days.  No tub baths, hot tubs or swimming for 5 days.     3. Call [facility]'s Cardiology Office ([phone]) if you develop a fever, redness or drainage at your wrist access site.      4. No driving for 2 days    5. Do not stop aspirin or Brilinta (Ticagrelor) any reason without a cardiologist’s consent, or the stent could block up and cause a heart attack.    6. Stent card and book.

## 2024-07-26 NOTE — QUICK NOTE
"Notified by his nurse that his O2 sat is borderline low 89-90  She noticed \"apneic episodes\" and wakes up gasping while she was in the room  He has a BMI of 39, thick neck and received sedating meds for his cath  He may have underlying sleep apnea worsened by meds and post op state  Replace o2 via nasal cannula  Check nocturnal pulse ox  OP sleep study  "

## 2024-07-26 NOTE — PLAN OF CARE

## 2024-07-26 NOTE — PROGRESS NOTES
Cardiac cath performed via the R radial artery.    Critical focal lesion in proximal L Cx just prior to the previously placed stent in the mid L Cx.  Successful PCI with placement of a 3.25 x 12 mm stent.    Critical long lesion in the distal l Cx. Successful PCI with placement of a 2.25 x 30 mm JESUS MANUEL.

## 2024-07-26 NOTE — ASSESSMENT & PLAN NOTE
Lab Results   Component Value Date    HGBA1C 7.4 (H) 07/25/2024       Recent Labs     07/25/24  1605 07/25/24  2042 07/26/24  0840 07/26/24  1116   POCGLU 120 219* 137 128       A1c is close to goal.    Continue sliding scale insulin   Resume metformin on discharge

## 2024-07-26 NOTE — ASSESSMENT & PLAN NOTE
Status post cardiac cath today confirming critical focal lesion in the proximal left circumflex just prior to the previously placed stent and critical long lesion in the distal circumflex.  Both were treated with drug-eluting stents  Continue aspirin 81 mg daily, Brilinta 90 mg twice daily, metoprolol 50 mg daily, lisinopril 5 mg daily,          Zetia 10 mg daily, Lipitor 80 mg daily  Recommended weight loss  Outpatient cardiac rehab

## 2024-07-26 NOTE — UTILIZATION REVIEW
"Continued Stay Review    Date: 7/26                          Current Patient Class: Observation      Current Level of Care: med surg    HPI:39 y.o. male initially admitted on 7/24 with exertional CP, SOB, dizzness, dyspnea, diaphoresis intermittently for a month worsening in last week. Symptoms resolve with rest. Was just hospitalized in the [facility] for cardiac work up and echo. Returned home on d/c      Cardiology   Cardiac cath performed via the R radial artery.  Critical focal lesion in proximal L Cx just prior to the previously placed stent in the mid L Cx.  Successful PCI with placement of a 3.25 x 12 mm stent.  Critical long lesion in the distal l Cx. Successful PCI with placement of a 2.25 x 30 mm JESUS MANUEL.    Attending  No CP, brief shortness of breath described as \" forced breathing\" has not resolved. .cath as above  Continue aspirin 81 mg daily, Brilinta 90 mg twice daily, metoprolol 50 mg daily, lisinopril 5 mg daily,          Zetia 10 mg daily, Lipitor 80 mg daily  Recommended weight loss  Outpatient cardiac rehab  Vital Signs (last 3 days)       Date/Time Temp Pulse Resp BP MAP (mmHg) SpO2 O2 Device Patient Position - Orthostatic VS [place] Coma Scale Score Pain    07/26/24 1650 -- 69 18 119/77 92 96 % None (Room air) Lying -- --    07/26/24 1550 -- 79 18 119/68 89 93 % None (Room air) Lying -- --    07/26/24 1520 -- 77 -- 131/84 103 97 % None (Room air) Lying -- --    07/26/24 1450 -- 74 18 104/69 83 98 % None (Room air) Lying -- --    07/26/24 1435 -- 78 18 120/82 97 96 % None (Room air) Lying -- --    07/26/24 1420 -- 78 18 121/81 98 98 % None (Room air) Lying -- --    07/26/24 1405 97.2 °F (36.2 °C) 71 18 128/76 95 96 % None (Room air) Lying -- No Pain    07/26/24 1113 97.6 °F (36.4 °C) 72 18 117/68 76 94 % None (Room air) Lying 15 No Pain    07/26/24 0805 98.3 °F (36.8 °C) 57 16 109/82 88 93 % None (Room air) Lying -- --    07/26/24 0305 97.7 °F (36.5 °C) 71 18 96/63 70 93 % -- Lying -- --    " 07/25/24 2337 97.6 °F (36.4 °C) 81 18 94/52 61 95 % -- Lying -- --    07/25/24 1949 -- -- -- -- -- -- None (Room air) -- 15 No Pain    07/25/24 1911 98 °F (36.7 °C) 79 18 115/79 85 97 % None (Room air) Sitting -- --    07/25/24 1446 98 °F (36.7 °C) 70 18 120/75 -- 96 % None (Room air) Lying -- --    07/25/24 1415 -- 70 -- 110/81 -- -- -- -- -- --    07/25/24 1127 -- 72 18 110/81 -- -- -- Sitting -- --    07/25/24 0900 -- -- -- -- -- -- None (Room air) -- 15 No Pain    07/25/24 0752 97.9 °F (36.6 °C) 67 18 115/81 -- 97 % None (Room air) Lying -- --    07/25/24 0326 97.5 °F (36.4 °C) 70 18 92/54 68 94 % -- Lying -- --    07/24/24 2350 97.3 °F (36.3 °C) 79 18 111/57 88 95 % -- Lying -- --    07/24/24 2334 -- -- -- -- -- -- None (Room air) -- 15 --    07/24/24 2137 -- -- -- -- -- -- -- -- -- 3    07/24/24 2126 97.8 °F (36.6 °C) 68 18 121/81 88 96 % None (Room air) Lying -- --    07/24/24 2100 -- 68 20 119/72 91 96 % None (Room air) Lying -- --    07/24/24 2000 -- 72 20 124/71 91 96 % None (Room air) Lying -- --    07/24/24 1930 -- -- -- -- -- -- -- -- -- 4    07/24/24 1838 -- -- -- -- -- -- -- -- 15 --    07/24/24 1837 -- -- -- -- -- -- None (Room air) -- -- --    07/24/24 1820 98.1 °F (36.7 °C) -- -- -- -- -- -- -- -- --    07/24/24 1814 -- 87 20 109/67 -- 97 % None (Room air) Sitting -- --          Weight (last 2 days)       Date/Time Weight    07/25/24 1415 108 (238)    07/24/24 1814 108 (238.76)              Pertinent Labs/Diagnostic Results:   Radiology:  XR chest 2 views   ED Interpretation by Daniel Tuttle MD (07/24 1954)   Primary reviewed; No acute abnotrmality      Final Interpretation by Shantal Paredes MD (07/24 2157)      No acute cardiopulmonary disease.            Workstation performed: [id number]           Cardiology:  Echo complete w/ contrast if indicated   Final Result by Kraig Lackey MD (07/25 1628)        Left Ventricle: Left ventricular cavity size is normal. Wall thickness    is  increased. There is mild concentric hypertrophy. The left ventricular    ejection fraction is 64%. Systolic function is normal. Wall motion is    normal. Diastolic function is normal.     Right Ventricle: Right ventricular cavity size is normal. Systolic    function is normal.         ECG 12 lead   Final Result by Ranjan Isabel MD (07/25 0727)   Normal sinus rhythm   Normal ECG   When compared with ECG of 24-JUL-2024 20:40, (unconfirmed)   No significant change was found   Confirmed by Ranjan Isabel ([id number]) on 7/25/2024 7:26:58 AM      ECG 12 lead   Final Result by Ranjan Isabel MD (07/25 0727)   * Age and gender specific ECG analysis *   Sinus rhythm with sinus arrhythmia   Normal ECG      Confirmed by Ranjan Isabel ([id number]) on 7/25/2024 7:27:44 AM      ECG 12 lead   Final Result by Ranjan Isabel MD (07/25 0729)   ** Age and gender specific ECG analysis **   Normal sinus rhythm   Normal ECG      Confirmed by Ranjan Isabel ([id number]) on 7/25/2024 7:29:55 AM        GI:  No orders to display           Results from last 7 days   Lab Units 07/26/24  0656 07/25/24  0616 07/24/24  1823   WBC Thousand/uL 5.97 5.31 6.95   HEMOGLOBIN g/dL 13.7 13.8 13.4   HEMATOCRIT % 41.4 41.9 39.9   PLATELETS Thousands/uL 253 266 284   TOTAL NEUT ABS Thousands/µL  --  2.91 4.31         Results from last 7 days   Lab Units 07/26/24  0656 07/25/24  0616 07/24/24  1823   SODIUM mmol/L 139 140 137   POTASSIUM mmol/L 3.8 4.3 3.7   CHLORIDE mmol/L 107 107 105   CO2 mmol/L 25 27 24   ANION GAP mmol/L 7 6 8   BUN mg/dL 9 8 8   CREATININE mg/dL 0.58* 0.62 0.79   EGFR ml/min/1.73sq m 127 124 112   CALCIUM mg/dL 8.8 9.0 8.6   MAGNESIUM mg/dL  --  2.1  --          Results from last 7 days   Lab Units 07/26/24  1116 07/26/24  0840 07/25/24  2042 07/25/24  1605 07/25/24  1124 07/25/24  0755 07/24/24  2238   POC GLUCOSE mg/dl 128 137 219* 120 178* 135 97     Results from last 7 days   Lab Units 07/26/24  0656 07/25/24  0616 07/24/24  1823   GLUCOSE  "RANDOM mg/dL 146* 124 207*         Results from last 7 days   Lab Units 07/25/24  0616   HEMOGLOBIN A1C % 7.4*   EAG mg/dl 166     No results found for: \"BETA-HYDROXYBUTYRATE\"                   Results from last 7 days   Lab Units 07/24/24  2235 07/24/24  2041 07/24/24  1823   HS TNI 0HR ng/L  --   --  6   HS TNI 2HR ng/L  --  5  --    HSTNI D2 ng/L  --  -1  --    HS TNI 4HR ng/L 6  --   --    HSTNI D4 ng/L 0  --   --          Results from last 7 days   Lab Units 07/26/24  0656   PROTIME seconds 13.9   INR  1.05                         Results from last 7 days   Lab Units 07/24/24  1823   BNP pg/mL 30                                                             Results from last 7 days   Lab Units 07/24/24  2314   SALMONELLA SP PCR  Negative   SHIGELLA SP/ENTEROINVASIVE E. COLI (EIEC)  Negative   CAMPYLOBACTER SP (JEJUNI AND COLI)  Negative   SHIGA TOXIN 1/SHIGA TOXIN 2  Negative                           Medications:   Scheduled Medications:  aspirin, 81 mg, Oral, Daily  atorvastatin, 80 mg, Oral, Daily With Dinner  enoxaparin, 40 mg, Subcutaneous, Daily  ezetimibe, 10 mg, Oral, Daily  insulin lispro, 1-6 Units, Subcutaneous, TID AC  insulin lispro, 1-6 Units, Subcutaneous, HS  [START ON 7/27/2024] lisinopril, 5 mg, Oral, Daily  metoprolol succinate, 50 mg, Oral, Daily  metroNIDAZOLE, 500 mg, Oral, Q8H JOSEFINA  pantoprazole, 40 mg, Oral, Early Morning  ticagrelor, 90 mg, Oral, Q12H JOSEFINA      Continuous IV Infusions:  sodium chloride, 100 mL/hr, Intravenous, Continuous      PRN Meds:       Discharge Plan: TBD    Network Utilization Review Department  ATTENTION: Please call with any questions or concerns to [phone] and carefully listen to the prompts so that you are directed to the right person. All voicemails are confidential.   For Discharge needs, contact Care Management DC Support Team at [phone] opt. [phone]  Send all requests for admission clinical reviews, approved or denied determinations and any other requests " to dedicated fax number below belonging to the campus where the patient is receiving treatment. List of dedicated fax numbers for the Facilities:  FACILITY NAME UR FAX NUMBER   ADMISSION DENIALS (Administrative/Medical Necessity) [phone]   DISCHARGE SUPPORT TEAM (NETWORK) [phone]   PARENT CHILD HEALTH (Maternity/NICU/Pediatrics) [phone]   [facility] [phone]   [facility] [phone]   [facility] [phone]   [facility] [phone]   [facility] [phone]   [facility] [phone]   [facility] [phone]   [facility] [phone]   [facility] [phone]   [facility] [phone]   [facility] [phone]   [facility] [phone]

## 2024-07-27 PROBLEM — G47.34 NOCTURNAL HYPOXEMIA: Status: ACTIVE | Noted: 2024-07-27

## 2024-07-27 PROBLEM — G47.33 OSA (OBSTRUCTIVE SLEEP APNEA): Status: ACTIVE | Noted: 2024-07-27

## 2024-07-27 LAB
ANION GAP SERPL CALCULATED.3IONS-SCNC: 7 MMOL/L (ref 4–13)
BUN SERPL-MCNC: 8 MG/DL (ref 5–25)
CALCIUM SERPL-MCNC: 8.6 MG/DL (ref 8.4–10.2)
CHLORIDE SERPL-SCNC: 107 MMOL/L (ref 96–108)
CO2 SERPL-SCNC: 22 MMOL/L (ref 21–32)
CREAT SERPL-MCNC: 0.59 MG/DL (ref 0.6–1.3)
ERYTHROCYTE [DISTWIDTH] IN BLOOD BY AUTOMATED COUNT: 13.3 % (ref 11.6–15.1)
GFR SERPL CREATININE-BSD FRML MDRD: 126 ML/MIN/1.73SQ M
GLUCOSE SERPL-MCNC: 124 MG/DL (ref 65–140)
GLUCOSE SERPL-MCNC: 125 MG/DL (ref 65–140)
GLUCOSE SERPL-MCNC: 148 MG/DL (ref 65–140)
GLUCOSE SERPL-MCNC: 150 MG/DL (ref 65–140)
GLUCOSE SERPL-MCNC: 176 MG/DL (ref 65–140)
HCT VFR BLD AUTO: 43.9 % (ref 36.5–49.3)
HGB BLD-MCNC: 14.6 G/DL (ref 12–17)
MCH RBC QN AUTO: 30.2 PG (ref 26.8–34.3)
MCHC RBC AUTO-ENTMCNC: 33.3 G/DL (ref 31.4–37.4)
MCV RBC AUTO: 91 FL (ref 82–98)
PLATELET # BLD AUTO: 266 THOUSANDS/UL (ref 149–390)
PMV BLD AUTO: 10.1 FL (ref 8.9–12.7)
POTASSIUM SERPL-SCNC: 4.9 MMOL/L (ref 3.5–5.3)
RBC # BLD AUTO: 4.83 MILLION/UL (ref 3.88–5.62)
SODIUM SERPL-SCNC: 136 MMOL/L (ref 135–147)
WBC # BLD AUTO: 8.79 THOUSAND/UL (ref 4.31–10.16)

## 2024-07-27 PROCEDURE — 99232 SBSQ HOSP IP/OBS MODERATE 35: CPT | Performed by: INTERNAL MEDICINE

## 2024-07-27 PROCEDURE — 99214 OFFICE O/P EST MOD 30 MIN: CPT | Performed by: STUDENT IN AN ORGANIZED HEALTH CARE EDUCATION/TRAINING PROGRAM

## 2024-07-27 PROCEDURE — 82948 REAGENT STRIP/BLOOD GLUCOSE: CPT

## 2024-07-27 PROCEDURE — 80048 BASIC METABOLIC PNL TOTAL CA: CPT

## 2024-07-27 PROCEDURE — 85027 COMPLETE CBC AUTOMATED: CPT

## 2024-07-27 PROCEDURE — 94762 N-INVAS EAR/PLS OXIMTRY CONT: CPT

## 2024-07-27 RX ADMIN — METRONIDAZOLE 500 MG: 500 TABLET ORAL at 21:22

## 2024-07-27 RX ADMIN — INSULIN LISPRO 1 UNITS: 100 INJECTION, SOLUTION INTRAVENOUS; SUBCUTANEOUS at 17:20

## 2024-07-27 RX ADMIN — ATORVASTATIN CALCIUM 80 MG: 80 TABLET, FILM COATED ORAL at 17:20

## 2024-07-27 RX ADMIN — ASPIRIN 81 MG: 81 TABLET, COATED ORAL at 09:08

## 2024-07-27 RX ADMIN — METRONIDAZOLE 500 MG: 500 TABLET ORAL at 06:41

## 2024-07-27 RX ADMIN — PANTOPRAZOLE SODIUM 40 MG: 40 TABLET, DELAYED RELEASE ORAL at 06:41

## 2024-07-27 RX ADMIN — METOPROLOL SUCCINATE 50 MG: 50 TABLET, EXTENDED RELEASE ORAL at 09:08

## 2024-07-27 RX ADMIN — TICAGRELOR 90 MG: 90 TABLET ORAL at 09:08

## 2024-07-27 RX ADMIN — TICAGRELOR 90 MG: 90 TABLET ORAL at 21:22

## 2024-07-27 RX ADMIN — INSULIN LISPRO 1 UNITS: 100 INJECTION, SOLUTION INTRAVENOUS; SUBCUTANEOUS at 21:22

## 2024-07-27 RX ADMIN — METRONIDAZOLE 500 MG: 500 TABLET ORAL at 14:44

## 2024-07-27 RX ADMIN — EZETIMIBE 10 MG: 10 TABLET ORAL at 09:08

## 2024-07-27 RX ADMIN — ENOXAPARIN SODIUM 40 MG: 40 INJECTION SUBCUTANEOUS at 09:08

## 2024-07-27 RX ADMIN — LISINOPRIL 5 MG: 5 TABLET ORAL at 09:08

## 2024-07-27 NOTE — ASSESSMENT & PLAN NOTE
Reportedly diagnosed with Giardia infection in the [place]  According to his significant other he only completed 3 days of antibiotic there.  It is unclear which antibiotic he actually got.  Continue Flagyl for 5 days total.

## 2024-07-27 NOTE — ASSESSMENT & PLAN NOTE
Lab Results   Component Value Date    HGBA1C 7.4 (H) 07/25/2024       Recent Labs     07/26/24  1656 07/26/24  2105 07/27/24  0727 07/27/24  1045   POCGLU 139 161* 124 150*       A1c is close to goal.    Continue sliding scale insulin   Resume metformin on discharge

## 2024-07-27 NOTE — PROGRESS NOTES
[facility]  Progress Note  Name: Jaleel Leblanc I  MRN: [id number]  Unit/Bed#: E4 -01 I Date of Admission: 7/24/2024   Date of Service: 7/27/2024 I Hospital Day: 1    Assessment & Plan   * Chest pain due to myocardial ischemia  Assessment & Plan  Status post cardiac cath on 7/26 confirming critical focal lesion in the proximal left circumflex just prior to the previously placed stent and critical long lesion in the distal circumflex.  Both were treated with drug-eluting stents  Continue aspirin 81 mg daily, Brilinta 90 mg twice daily, metoprolol 50 mg daily, lisinopril 5 mg daily, Zetia and Lipitor  Emphasized need for weight loss   outpatient cardiac rehab    Nocturnal hypoxemia  Assessment & Plan  Patient had nocturnal pulse oximetry done last night with 16 desaturation events.  Discussed finding with the patient who admitted that he was diagnosed with obstructive sleep apnea many years ago.  However he never got CPAP titration done because he left for the [place].  Will perform nocturnal pulse oximetry again tonight with 2 L of oxygen.  He will need home oxygen at bedtime  He will need outpatient formal sleep study    History of diarrhea  Assessment & Plan  Reportedly diagnosed with Giardia infection in the [place]  According to his significant other he only completed 3 days of antibiotic there.  It is unclear which antibiotic he actually got.  Continue Flagyl for 5 days total.    Type 2 diabetes mellitus with hyperglycemia, without long-term current use of insulin ([facility])  Assessment & Plan  Lab Results   Component Value Date    HGBA1C 7.4 (H) 07/25/2024       Recent Labs     07/26/24  1656 07/26/24  2105 07/27/24  0727 07/27/24  1045   POCGLU 139 161* 124 150*       A1c is close to goal.    Continue sliding scale insulin   Resume metformin on discharge    Essential hypertension  Assessment & Plan  Continue Toprol-XL 50 mg daily and lisinopril 5 mg  daily    Hypercholesterolemia  Assessment & Plan  Continue Lipitor 80 mg daily, Zetia 10 mg daily         VTE Pharmacologic Prophylaxis: VTE Score: 3 Moderate Risk (Score 3-4) - Pharmacological DVT Prophylaxis Ordered: enoxaparin (Lovenox).      Patient Centered Rounds:  Discussed with his nurse    Discussions with Specialists or Other Care Team Provider: RT    Current Length of Stay: 1 day(s)  Current Patient Status: Inpatient   Certification Statement: The patient will continue to require additional inpatient hospital stay due to suspected RAJINDER  Discharge Plan: Anticipate discharge tomorrow to home.    Code Status: Level 1 - Full Code    Subjective:   No CP  He admitted occasional SOB  He admitted being diagnosed with sleep apnea many years ago. He did not complete CPAP titration when he moved to .   He had 16 desaturation events on nocturnal pulse ox last night  Spoke via  # [id number]    Objective:     Vitals:   Temp (24hrs), Av.4 °F (36.3 °C), Min:96.4 °F (35.8 °C), Max:98.1 °F (36.7 °C)    Temp:  [96.4 °F (35.8 °C)-98.1 °F (36.7 °C)] 96.4 °F (35.8 °C)  HR:  [67-89] 71  Resp:  [16-18] 18  BP: (100-131)/(56-84) 116/78  SpO2:  [93 %-99 %] 97 %  Body mass index is 39.84 kg/m².     Input and Output Summary (last 24 hours):   No intake or output data in the 24 hours ending 24 1257    Physical Exam:   Physical Exam  Vitals reviewed.   Constitutional:       Appearance: He is obese.   HENT:      Head: Normocephalic and atraumatic.      Nose: No congestion or rhinorrhea.   Eyes:      General: No scleral icterus.  Cardiovascular:      Rate and Rhythm: Normal rate and regular rhythm.   Pulmonary:      Breath sounds: No wheezing or rhonchi.   Abdominal:      Palpations: Abdomen is soft.      Tenderness: There is no abdominal tenderness. There is no guarding.   Musculoskeletal:      Right lower leg: No edema.      Left lower leg: No edema.   Skin:     General: Skin is warm and dry.   Neurological:       Mental Status: He is oriented to person, place, and time.   Psychiatric:         Mood and Affect: Mood normal.         Behavior: Behavior normal.        Additional Data:     Labs:  Results from last 7 days   Lab Units 07/27/24  0822 07/26/24  0656 07/25/24  0616   WBC Thousand/uL 8.79   < > 5.31   HEMOGLOBIN g/dL 14.6   < > 13.8   HEMATOCRIT % 43.9   < > 41.9   PLATELETS Thousands/uL 266   < > 266   SEGS PCT %  --   --  56   LYMPHO PCT %  --   --  33   MONO PCT %  --   --  8   EOS PCT %  --   --  2    < > = values in this interval not displayed.     Results from last 7 days   Lab Units 07/27/24  0718   SODIUM mmol/L 136   POTASSIUM mmol/L 4.9   CHLORIDE mmol/L 107   CO2 mmol/L 22   BUN mg/dL 8   CREATININE mg/dL 0.59*   ANION GAP mmol/L 7   CALCIUM mg/dL 8.6   GLUCOSE RANDOM mg/dL 125     Results from last 7 days   Lab Units 07/26/24  0656   INR  1.05     Results from last 7 days   Lab Units 07/27/24  1045 07/27/24  0727 07/26/24  2105 07/26/24  1656 07/26/24  1116 07/26/24  0840 07/25/24  2042 07/25/24  1605 07/25/24  1124 07/25/24  0755 07/24/24  2238   POC GLUCOSE mg/dl 150* 124 161* 139 128 137 219* 120 178* 135 97     Results from last 7 days   Lab Units 07/25/24  0616   HEMOGLOBIN A1C % 7.4*           Lines/Drains:  Invasive Devices       Peripheral Intravenous Line  Duration             Peripheral IV 07/24/24 Distal;Left;Upper;Ventral (anterior) Arm 2 days                      Telemetry:  Telemetry Orders (From admission, onward)               24 Hour Telemetry Monitoring  Continuous x 24 Hours (Telem)        Expiring   Question:  Reason for 24 Hour Telemetry  Answer:  PCI/EP study (including pacer and ICD implementation), Cardiac surgery, MI, abnormal cardiac cath, and chest pain- rule out MI                              Imaging: Reviewed radiology reports from this admission including: procedure reports    Recent Cultures (last 7 days):         Last 24 Hours Medication List:   Current Facility-Administered  Medications   Medication Dose Route Frequency Provider Last Rate    aspirin  81 mg Oral Daily Delmy Moseleyek, LAUREEN      atorvastatin  80 mg Oral Daily With Dinner Delmy Moseleyek, LAUREEN      enoxaparin  40 mg Subcutaneous Daily Delmy Sarahcsek, CRANDREW      ezetimibe  10 mg Oral Daily Delmy Sarahcsek, CRNP      insulin lispro  1-6 Units Subcutaneous TID AC Delmy Moseleyek, CRNP      insulin lispro  1-6 Units Subcutaneous HS Delmy Moseleyek, CRANDREW      lisinopril  5 mg Oral Daily Delmy Loreleiek, CRANDREW      metoprolol succinate  50 mg Oral Daily Delmy Loreleiek, CRNP      metroNIDAZOLE  500 mg Oral Q8H JOSEFINA Delmy Moseleyek, CRANDREW      pantoprazole  40 mg Oral Early Morning Delmy Sarahcsek, CRNP      ticagrelor  90 mg Oral Q12H JOSEFINA Delmy Moseleyek, LAUREEN          Today, Patient Was Seen By: Christiano Blakely MD    **Please Note: This note may have been constructed using a voice recognition system.**

## 2024-07-27 NOTE — CASE MANAGEMENT
Case Management Assessment & Discharge Planning Note    Patient name Jaleel Leblanc  Location East 4 /E4 -* MRN [id number]  : 1985 Date 2024       Current Admission Date: 2024  Current Admission Diagnosis:Chest pain due to myocardial ischemia   Patient Active Problem List    Diagnosis Date Noted Date Diagnosed    Nocturnal hypoxemia 2024     Status post insertion of drug eluting coronary artery stent 2024     Chest pain due to myocardial ischemia 2024     History of diarrhea 2024     CAD (coronary artery disease) 2024     Pericarditis 2019     Noncompliance 2019     Hypercholesterolemia 2018     Essential hypertension 2018     Type 2 diabetes mellitus with hyperglycemia, without long-term current use of insulin ([facility]) 2018     Morbid obesity with body mass index (BMI) of 40.0 to 44.9 in adult ([facility]) 2018       LOS (days): 1  Geometric Mean LOS (GMLOS) (days):   Days to GMLOS:     OBJECTIVE:    Risk of Unplanned Readmission Score: 8.78         Current admission status: Inpatient       [facility] Pharm[facility]:   [id number] [place], [place] - [address]  [address] [address]  Phone: [phone] Fax: [phone]    [facility] [place] [id number]  [address]  [facility] [address]  Phone: [phone] Fax: [phone]    Primary Care Provider: No primary care provider on file.    Primary Insurance: [facility]  Secondary Insurance:     ASSESSMENT:  Active Health Care Proxies       Dona English Health Care Representative - Spouse   Primary Phone: [phone] (Mobile)                 Patient Information  Admitted from:: Home  Mental Status: Alert  During Assessment patient was accompanied by: Spouse  Assessment information provided by:: Patient  Primary Caregiver: Self  Support Systems: Spouse/significant other, Family  members  What city do you live in?: [facility] (Staying with Spouse in [place], [place])    Activities of Daily Living Prior to Admission  Functional Status: Independent    Means of Transportation  Means of Transport to Appts:: Drives Self    DISCHARGE DETAILS:    Discharge planning discussed with:: Patient  Freedom of Choice: Yes  Comments - Freedom of Choice: Pt prefers to discharge home.    Were Treatment Team discharge recommendations reviewed with patient/caregiver?: Yes  Did patient/caregiver verbalize understanding of patient care needs?: Yes  Were patient/caregiver advised of the risks associated with not following Treatment Team discharge recommendations?: Yes    Contacts  Patient Contacts: Dona English (Spouse) [phone] (M)  Relationship to Patient:: Family  Contact Method: In Person  Reason/Outcome: Emergency Contact, Continuity of Care    Additional Comments: CM Consulted to assist Pt with medications and Nocturnal O2 set up. CM met with Pt and his spouse, using Language Line and interpretor Jaleel. Pt made aware that the medications he has been prescribed cannot be filled in a [place] pharmacy as Pt [place] a [place] insurance. Additionally, Pt will need to receive DME supplies through his [facility] as well. SLIM made aware and sent medications to [facility], [place], [place]. Pt's primary care in the [facility] also noted as Anaya Johnson, Internal Medicine, [facility], [facility]. Pt states he will be able to drive himself to and from the [facility] for medications and medical equipment until he is able to permaently move to [place]. Pt made aware that at that time, he will need to start the process of changing his [place] to [place] via Dept of Health and . Pt acknowledged. Pt is to receive a repeat overnight O2 studay tonight. CM on standby for final outcome and discharge needs. CM following...Consult addressed.

## 2024-07-27 NOTE — ASSESSMENT & PLAN NOTE
Status post cardiac cath today confirming critical focal lesion in the proximal left circumflex just prior to the previously placed stent and critical long lesion in the distal circumflex.  Both were treated with drug-eluting stents  Continue aspirin 81 mg daily, Brilinta 90 mg twice daily, metoprolol 50 mg daily, lisinopril 5 mg daily, Zetia and Lipitor  Emphasized need for weight loss   outpatient cardiac rehab

## 2024-07-27 NOTE — UTILIZATION REVIEW
NOTIFICATION OF INPATIENT ADMISSION   AUTHORIZATION REQUEST   SERVICING FACILITY:   [place] [address]  Tax ID: [id number]  NPI: [id number] ATTENDING PROVIDER:  Attending Name and NPI#: Christiano Blakely Md [[id number]]  Address: [address] [place] [address]  Phone: [phone]     ADMISSION INFORMATION:  Place of Service: Inpatient [facility]  Place of Service Code: 21  Inpatient Admission Date/Time: 7/26/24  5:04 PM  Discharge Date/Time: No discharge date for patient encounter.  Admitting Diagnosis Code/Description:  Coronary artery disease [I25.10]  Chest pain [R07.9]  Chest pain, rule out acute myocardial infarction [R07.9]     UTILIZATION REVIEW CONTACT:  Kaykay Mendoza Utilization   Network Utilization Review Department  Phone: [phone]  Fax [phone]  Email: [email or web address]  Contact for approvals/pending authorizations, clinical reviews, and discharge.     PHYSICIAN ADVISORY SERVICES:  Medical Necessity Denial & Hqmg-np-Gxfk Review  Phone: [phone]  Fax: [phone]  Email: [email or web address]     DISCHARGE SUPPORT TEAM:  For Patients Discharge Needs & Updates  Phone: [phone] opt. 2 Fax: [phone]  Email: [email or web address]

## 2024-07-27 NOTE — ASSESSMENT & PLAN NOTE
Patient had nocturnal pulse oximetry done last night with 16 desaturation events.  Discussed finding with the patient who admitted that he was diagnosed with obstructive sleep apnea many years ago.  However he never got CPAP titration done because he left for the [place].  Will perform nocturnal pulse oximetry again tonight with 2 L of oxygen.  He will need home oxygen at bedtime  He will need outpatient formal sleep study

## 2024-07-27 NOTE — PLAN OF CARE

## 2024-07-27 NOTE — PROGRESS NOTES
Cardiology Progress Note - Jaleel Leblacn 39 y.o. male MRN: [id number]    Unit/Bed#: E4 -01 Encounter: [id number]      Assessment & Plan:    Exertional chest pain  -Over the last month, patient has been having increased chest pain with exertion, denies any chest pain at rest  - Symptoms consistent with stable angina  - Troponins negative x 4 this admission  - No acute ischemic EKG changes  - Of note patient was in a hospital with [place] last week and was told he had an abnormal echocardiogram and elevated troponins possibly  - [facility] 7/26/2024 showed critical stenosis in the proximal and distal LCx, status post PCI with JESUS MANUEL x 1 to the proximal to mid LCx and JESUS MANUEL x 1 to the distal LCx  - Continue with aspirin 81 mg daily, Brilinta 90 mg twice daily and atorvastatin 40 mg daily    CAD (coronary artery disease)  -History of MI in 2014 with PCI JESUS MANUEL x 2 to 100% OM and 95% mid circumflex  - [facility] 11/4/2019 showed 50% distal circumflex, 60% proximal RCA  - TTE 11/5/2019 showed EF 60%, no significant valvular disease  - TTE 7/25/2024 showed EF 64%, no significant valvular disease  - Continue with aspirin 81 mg daily  - Start Brilinta 90 mg twice daily given new stents    Essential hypertension  -Outpatient Rx enalapril 2.5 mg daily and Toprol-XL 50 mg daily  - Currently on lisinopril 5 mg daily and Toprol-XL 50 mg daily    Hypercholesterolemia  -Continue with atorvastatin 40 mg daily and Zetia 10 mg daily    Type 2 diabetes mellitus with hyperglycemia, without long-term current use of insulin ([facility])    History of diarrhea     Summary:  -[facility] yesterday showed a proximal and distal LCx stenosis, status post JESUS MANUEL x 1 to the proximal to mid LAD and JESUS MANUEL x 1 to the distal LAD  - Continue with aspirin 81 mg daily, Brilinta 90 mg twice daily and atorvastatin 40 mg daily  - Please price check Brilinta prior to discharge  - Continue Toprol-XL 50 mg daily, can resume outpatient enalapril 2.5 mg daily on discharge in  "place of lisinopril  - He is stable from cardiac standpoint for discharge home today  - Cardiac rehab referral on discharge  - Will message office to arrange an outpatient follow-up appointment    Subjective:   No significant events overnight.  He reports having some shortness of breath yesterday after the heart catheterization, possibly due to Brilinta.  Reports his breathing is now back to normal this morning and has no complaints.  Denies chest pain, abdominal pain, nausea, vomiting, fever, chills, headache, dizziness or palpitations.    Objective:     Vitals: Blood pressure 114/75, pulse 67, temperature (!) 96.6 °F (35.9 °C), temperature source Temporal, resp. rate 18, height 5' 5\" (1.651 m), weight 109 kg (239 lb 6.7 oz), SpO2 95%., Body mass index is 39.84 kg/m².,   Orthostatic Blood Pressures      Flowsheet Row Most Recent Value   Blood Pressure 114/75 filed at 07/27/2024 0725   Patient Position - Orthostatic VS Lying filed at 07/27/2024 0725            No intake or output data in the 24 hours ending 07/27/24 1002        Physical Exam:    GEN: Jaleel Leblanc appears well, alert and oriented x 3, pleasant and cooperative   HEENT: Mucous membranes moist, no scleral icterus, no conjunctival pallor  NECK: No elevated JVD  HEART: Regular rate and rhythm, normal S1 and S2, no murmurs or rubs   LUNGS: clear to auscultation bilaterally; no wheezes, rales, or rhonchi   ABDOMEN: normal bowel sounds, soft, no tenderness, no distention  EXTREMITIES: peripheral pulses normal; no lower extremity edema   NEURO: no focal findings   SKIN: No lesions or rashes on exposed skin        Current Facility-Administered Medications:     aspirin (ECOTRIN LOW STRENGTH) EC tablet 81 mg, 81 mg, Oral, Daily, Delmy Sarahcsek, CRNP, 81 mg at 07/27/24 0908    atorvastatin (LIPITOR) tablet 80 mg, 80 mg, Oral, Daily With Dinner, Delmy Grecsek, CRNP, 80 mg at 07/26/24 1715    enoxaparin (LOVENOX) subcutaneous injection 40 mg, 40 mg, " Subcutaneous, Daily, Delmy Grecsek, CRNP, 40 mg at 07/27/24 0908    ezetimibe (ZETIA) tablet 10 mg, 10 mg, Oral, Daily, Delmy Grecsek, CRNP, 10 mg at 07/27/24 0908    insulin lispro (HumALOG/ADMELOG) 100 units/mL subcutaneous injection 1-6 Units, 1-6 Units, Subcutaneous, TID AC, 1 Units at 07/25/24 1310 **AND** Fingerstick Glucose (POCT), , , TID AC, Delmy Grecsek, CRNP    insulin lispro (HumALOG/ADMELOG) 100 units/mL subcutaneous injection 1-6 Units, 1-6 Units, Subcutaneous, HS, Delmy Grecsek, CRNP, 1 Units at 07/26/24 2140    lisinopril (ZESTRIL) tablet 5 mg, 5 mg, Oral, Daily, Delmy Grecsek, CRNP, 5 mg at 07/27/24 0908    metoprolol succinate (TOPROL-XL) 24 hr tablet 50 mg, 50 mg, Oral, Daily, Delmy Grecsek, CRNP, 50 mg at 07/27/24 0908    metroNIDAZOLE (FLAGYL) tablet 500 mg, 500 mg, Oral, Q8H JOSEFINA, Delmy Grecsek, CRNP, 500 mg at 07/27/24 0641    pantoprazole (PROTONIX) EC tablet 40 mg, 40 mg, Oral, Early Morning, Delmy Grecsek, CRNP, 40 mg at 07/27/24 0641    ticagrelor (BRILINTA) tablet 90 mg, 90 mg, Oral, Q12H JOSEFINA, Delmy Grecsek, CRNP, 90 mg at 07/27/24 0908    Labs & Results:    Lab Results   Component Value Date    CKTOTAL 305 03/01/2014    CKTOTAL 267 03/01/2014    CKTOTAL 175 03/01/2014    CKMBINDEX 7.6 (H) 03/01/2014    CKMBINDEX 7.0 (H) 03/01/2014    CKMBINDEX 3.6 03/01/2014    TROPONINI <0.02 10/01/2021    TROPONINI <0.02 10/01/2021    TROPONINI 8.29 (H) 11/04/2019       Lab Results   Component Value Date    GLUCOSE 148 (H) 03/01/2014    CALCIUM 8.6 07/27/2024     03/01/2014    K 4.9 07/27/2024    CO2 22 07/27/2024     07/27/2024    BUN 8 07/27/2024    CREATININE 0.59 (L) 07/27/2024       Lab Results   Component Value Date    WBC 8.79 07/27/2024    HGB 14.6 07/27/2024    HCT 43.9 07/27/2024    MCV 91 07/27/2024     07/27/2024     Results from last 7 days   Lab Units 07/26/24  0656   INR  1.05       Lab Results   Component Value Date    CHOL 350 03/01/2014     Lab  Results   Component Value Date    HDL 22 (L) 07/25/2024    HDL 31 (L) 11/04/2019     Lab Results   Component Value Date    LDLCALC 109 (H) 07/25/2024    LDLCALC  11/04/2019      Comment:      Calculated LDL invalid, triglycerides >400 mg/dl     Lab Results   Component Value Date    TRIG 170 (H) 07/25/2024    TRIG 526 (H) 11/04/2019       Lab Results   Component Value Date    ALT 32 11/03/2019    AST 28 11/03/2019    ALKPHOS 113 11/03/2019         EKG personally reviewed by )Kraig Lackey MD. No acute changes   TELE: No significant arrhythmias seen on telemetry review.

## 2024-07-28 ENCOUNTER — APPOINTMENT (INPATIENT)
Dept: RADIOLOGY | Facility: HOSPITAL | Age: 39
End: 2024-07-28
Payer: COMMERCIAL

## 2024-07-28 PROBLEM — R06.00 DYSPNEA: Status: ACTIVE | Noted: 2024-07-28

## 2024-07-28 PROBLEM — Z71.89 COMPLEX CARE COORDINATION: Status: ACTIVE | Noted: 2024-07-28

## 2024-07-28 LAB
GLUCOSE SERPL-MCNC: 112 MG/DL (ref 65–140)
GLUCOSE SERPL-MCNC: 131 MG/DL (ref 65–140)
GLUCOSE SERPL-MCNC: 180 MG/DL (ref 65–140)
GLUCOSE SERPL-MCNC: 186 MG/DL (ref 65–140)

## 2024-07-28 PROCEDURE — 71046 X-RAY EXAM CHEST 2 VIEWS: CPT

## 2024-07-28 PROCEDURE — 94762 N-INVAS EAR/PLS OXIMTRY CONT: CPT

## 2024-07-28 PROCEDURE — 82948 REAGENT STRIP/BLOOD GLUCOSE: CPT

## 2024-07-28 PROCEDURE — 99232 SBSQ HOSP IP/OBS MODERATE 35: CPT | Performed by: INTERNAL MEDICINE

## 2024-07-28 RX ORDER — METRONIDAZOLE 500 MG/1
500 TABLET ORAL EVERY 8 HOURS SCHEDULED
Status: COMPLETED | OUTPATIENT
Start: 2024-07-28 | End: 2024-07-28

## 2024-07-28 RX ORDER — ALBUMIN (HUMAN) 12.5 G/50ML
25 SOLUTION INTRAVENOUS ONCE
Status: COMPLETED | OUTPATIENT
Start: 2024-07-28 | End: 2024-07-28

## 2024-07-28 RX ADMIN — EZETIMIBE 10 MG: 10 TABLET ORAL at 09:37

## 2024-07-28 RX ADMIN — ALBUMIN (HUMAN) 25 G: 0.25 INJECTION, SOLUTION INTRAVENOUS at 05:25

## 2024-07-28 RX ADMIN — ASPIRIN 81 MG: 81 TABLET, COATED ORAL at 09:36

## 2024-07-28 RX ADMIN — METRONIDAZOLE 500 MG: 500 TABLET ORAL at 05:25

## 2024-07-28 RX ADMIN — ATORVASTATIN CALCIUM 80 MG: 80 TABLET, FILM COATED ORAL at 17:01

## 2024-07-28 RX ADMIN — ENOXAPARIN SODIUM 40 MG: 40 INJECTION SUBCUTANEOUS at 09:36

## 2024-07-28 RX ADMIN — TICAGRELOR 90 MG: 90 TABLET ORAL at 21:06

## 2024-07-28 RX ADMIN — INSULIN LISPRO 1 UNITS: 100 INJECTION, SOLUTION INTRAVENOUS; SUBCUTANEOUS at 17:01

## 2024-07-28 RX ADMIN — PANTOPRAZOLE SODIUM 40 MG: 40 TABLET, DELAYED RELEASE ORAL at 05:25

## 2024-07-28 RX ADMIN — METRONIDAZOLE 500 MG: 500 TABLET ORAL at 21:06

## 2024-07-28 RX ADMIN — TICAGRELOR 90 MG: 90 TABLET ORAL at 09:37

## 2024-07-28 RX ADMIN — METRONIDAZOLE 500 MG: 500 TABLET ORAL at 15:09

## 2024-07-28 RX ADMIN — INSULIN LISPRO 1 UNITS: 100 INJECTION, SOLUTION INTRAVENOUS; SUBCUTANEOUS at 12:38

## 2024-07-28 NOTE — ASSESSMENT & PLAN NOTE
The patient is a resident of [place] and [place] and [place]  He stated he  eventually wants to move here but still lives there  Discussed with case management.  His oxygen equipment and prescriptions will have to be filled in [place]   Discussed with case management today who reached out to his  family pharmacy and surgical supply [facility] which is currently closed.

## 2024-07-28 NOTE — ASSESSMENT & PLAN NOTE
Possibly from Brilinta.  Chest x-ray rule out edema/infiltrate  Discussed with Dr. Lackey.  May need to be switched to Plavix if he has ongoing dyspnea from Brilinta

## 2024-07-28 NOTE — ASSESSMENT & PLAN NOTE
Patient had nocturnal pulse oximetry with room air on 7/27/2024 with 16 desaturation events.  Follow-up nocturnal pulse oximetry on 2 L showed resolution of desaturation events  Will need prescription for nocturnal oxygen   Emphasized  weight loss and outpatient formal sleep study

## 2024-07-28 NOTE — CASE MANAGEMENT
Case Management Discharge Planning Note    Patient name Jaleel Leblanc  Location [facility] /E4 -* MRN [id number]  : 1985 Date 2024       Current Admission Date: 2024  Current Admission Diagnosis:Chest pain due to myocardial ischemia   Patient Active Problem List    Diagnosis Date Noted Date Diagnosed    Nocturnal hypoxemia 2024     Status post insertion of drug eluting coronary artery stent 2024     Chest pain due to myocardial ischemia 2024     History of diarrhea 2024     CAD (coronary artery disease) 2024     Pericarditis 2019     Noncompliance 2019     Hypercholesterolemia 2018     Essential hypertension 2018     Type 2 diabetes mellitus with hyperglycemia, without long-term current use of insulin ([facility]) 2018     Morbid obesity with body mass index (BMI) of 40.0 to 44.9 in adult ([facility]) 2018       LOS (days): 2  Geometric Mean LOS (GMLOS) (days):   Days to GMLOS:     OBJECTIVE:  Risk of Unplanned Readmission Score: 8.87         Current admission status: Inpatient   Preferred Pharmacy:   [id number] - [place], [place] - [address]  [address] [address]  Phone: [phone] Fax: [phone]    [facility] [place] [address]  [facility] [address] [place] [address]  Phone: [phone] Fax: [phone]    Primary Care Provider: No primary care provider on file.    Primary Insurance: [facility]  Secondary Insurance:     DISCHARGE DETAILS:    Discharge planning discussed with:: Patient  Freedom of Choice: Yes  Comments - Freedom of Choice: Pt to discharge home    Additional Comments: CM met with Pt this morning to inform him that he will not be able to disharge until tomorrow () due to his home pharmacy being closed.  CM to call the pharmacy to confirm coverage of Brilinta and/or Pt's provider on  to send home O2 order  to preferred  in network supplier. O2 order to be sent via parachute if Pt's insurance will accept the order signed by MIKE. Alternatively, overnight desat study will be sent directly to Pt's provider for home O2 order processing. Pt verbalised understanding. CM continues to follow.

## 2024-07-28 NOTE — PROGRESS NOTES
[facility]  Progress Note  Name: Jaleel Leblanc I  MRN: [id number]  Unit/Bed#: E4 -01 I Date of Admission: 7/24/2024   Date of Service: 7/28/2024 I Hospital Day: 2    Assessment & Plan   * Chest pain due to myocardial ischemia  Assessment & Plan  Status post cardiac cath on 7/26 confirming critical focal lesion in the proximal left circumflex just prior to the previously placed stent and critical long lesion in the distal circumflex.  Both were treated with drug-eluting stents  Continue aspirin 81 mg daily, Brilinta 90 mg twice daily, metoprolol 50 mg daily, lisinopril 5 mg daily, Zetia and Lipitor  Emphasized need for weight loss   Outpatient cardiac rehab    Complex care coordination  Assessment & Plan  The patient is a resident of [place]  He stated he  eventually wants to move here but still lives there  Discussed with case management.  His oxygen equipment and prescriptions will have to be filled in [place]   Discussed with case management today who reached out to his  family pharmacy and surgical supply [facility] which is currently closed.    Dyspnea  Assessment & Plan  Possibly from Brilinta.  Chest x-ray rule out edema/infiltrate  Discussed with Dr. Lackey.  May need to be switched to Plavix if he has ongoing dyspnea from Brilinta    Nocturnal hypoxemia  Assessment & Plan  Patient had nocturnal pulse oximetry with room air on 7/27/2024 with 16 desaturation events.  Follow-up nocturnal pulse oximetry on 2 L showed resolution of desaturation events  Will need prescription for nocturnal oxygen   Emphasized  weight loss and outpatient formal sleep study    Type 2 diabetes mellitus with hyperglycemia, without long-term current use of insulin ([facility])  Assessment & Plan  Lab Results   Component Value Date    HGBA1C 7.4 (H) 07/25/2024       Recent Labs     07/27/24  1538 07/27/24  2038 07/28/24  0722 07/28/24  1133   POCGLU 176* 148*  131 186*     Resume metformin on discharge    Essential hypertension  Assessment & Plan  Continue Toprol-XL 50 mg daily and lisinopril 5 mg daily    Hypercholesterolemia  Assessment & Plan  Continue Lipitor 80 mg daily and Zetia 10 mg daily     History of diarrhea  Assessment & Plan  Reportedly diagnosed with Giardia infection in the [place]  According to his significant other he only completed 3 days of antibiotic there.  It is unclear which antibiotic he actually got.  Continue Flagyl for 2 more doses to complete 5 days of treatment here.             VTE Pharmacologic Prophylaxis: VTE Score: 3 Moderate Risk (Score 3-4) - Pharmacological DVT Prophylaxis Ordered: enoxaparin (Lovenox).    Patient Centered Rounds: I performed bedside rounds with nursing staff today.   Discussions with Specialists or Other Care Team Provider: Dr. Lackey    Current Length of Stay: 2 day(s)  Current Patient Status: Inpatient   Certification Statement: The patient will continue to require additional inpatient hospital stay due to Complicated DC and ongoing dyspnea  Discharge Plan: Anticipate discharge in 24-48 hrs to home.    Code Status: Level 1 - Full Code    Subjective:   Seen and examined this morning during rounds.  I spoke to him via  #[phone]  He said he was feeling well except for episodes of shortness of breath when he lays on his side and flat  Denies chest pain on exertion  He currently resides in [place] but eventually plans to move here  [place] still [place]    Objective:     Vitals:   Temp (24hrs), Av.6 °F (36.4 °C), Min:97.4 °F (36.3 °C), Max:97.8 °F (36.6 °C)    Temp:  [97.4 °F (36.3 °C)-97.8 °F (36.6 °C)] 97.6 °F (36.4 °C)  HR:  [60-74] 73  Resp:  [16-18] 16  BP: ()/(56-70) 96/64  SpO2:  [94 %-99 %] 99 %  Body mass index is 39.51 kg/m².     Input and Output Summary (last 24 hours):     Intake/Output Summary (Last 24 hours) at 2024 1225  Last data  filed at 7/27/2024 2100  Gross per 24 hour   Intake 30 ml   Output --   Net 30 ml       Physical Exam:   Physical Exam  Constitutional:       Appearance: He is obese.   HENT:      Head: Normocephalic and atraumatic.      Nose: No congestion or rhinorrhea.   Eyes:      General: No scleral icterus.  Cardiovascular:      Rate and Rhythm: Normal rate and regular rhythm.   Pulmonary:      Breath sounds: No wheezing or rhonchi.   Abdominal:      Palpations: Abdomen is soft.      Tenderness: There is no abdominal tenderness. There is no guarding.   Musculoskeletal:      Right lower leg: No edema.      Left lower leg: No edema.   Skin:     General: Skin is warm and dry.   Neurological:      Mental Status: He is oriented to person, place, and time.   Psychiatric:         Mood and Affect: Mood normal.         Behavior: Behavior normal.         Additional Data:     Labs:  Results from last 7 days   Lab Units 07/27/24  0822 07/26/24  0656 07/25/24  0616   WBC Thousand/uL 8.79   < > 5.31   HEMOGLOBIN g/dL 14.6   < > 13.8   HEMATOCRIT % 43.9   < > 41.9   PLATELETS Thousands/uL 266   < > 266   SEGS PCT %  --   --  56   LYMPHO PCT %  --   --  33   MONO PCT %  --   --  8   EOS PCT %  --   --  2    < > = values in this interval not displayed.     Results from last 7 days   Lab Units 07/27/24  0718   SODIUM mmol/L 136   POTASSIUM mmol/L 4.9   CHLORIDE mmol/L 107   CO2 mmol/L 22   BUN mg/dL 8   CREATININE mg/dL 0.59*   ANION GAP mmol/L 7   CALCIUM mg/dL 8.6   GLUCOSE RANDOM mg/dL 125     Results from last 7 days   Lab Units 07/26/24  0656   INR  1.05     Results from last 7 days   Lab Units 07/28/24  1133 07/28/24  0722 07/27/24  2038 07/27/24  1538 07/27/24  1045 07/27/24  0727 07/26/24  2105 07/26/24  1656 07/26/24  1116 07/26/24  0840 07/25/24  2042 07/25/24  1605   POC GLUCOSE mg/dl 186* 131 148* 176* 150* 124 161* 139 128 137 219* 120     Results from last 7 days   Lab Units 07/25/24  0616   HEMOGLOBIN A1C % 7.4*            Lines/Drains:  Invasive Devices       Peripheral Intravenous Line  Duration             Peripheral IV 07/24/24 Distal;Left;Upper;Ventral (anterior) Arm 3 days                      Telemetry:  Telemetry Orders (From admission, onward)               24 Hour Telemetry Monitoring  Continuous x 24 Hours (Telem)        Question:  Reason for 24 Hour Telemetry  Answer:  PCI/EP study (including pacer and ICD implementation), Cardiac surgery, MI, abnormal cardiac cath, and chest pain- rule out MI                            Imaging: Reviewed radiology reports from this admission including: procedure reports    Recent Cultures (last 7 days):         Last 24 Hours Medication List:   Current Facility-Administered Medications   Medication Dose Route Frequency Provider Last Rate    aspirin  81 mg Oral Daily Delmy Grecsek, CRNP      atorvastatin  80 mg Oral Daily With Dinner Delmy Grecsek, CRNP      enoxaparin  40 mg Subcutaneous Daily Delmy Grecsek, CRNP      ezetimibe  10 mg Oral Daily Delmy Grecsek, CRNP      insulin lispro  1-6 Units Subcutaneous TID AC Delmy Grecsek, CRNP      insulin lispro  1-6 Units Subcutaneous HS Delmy Grecsek, CRNP      lisinopril  5 mg Oral Daily Delmy Grecsek, CRNP      metoprolol succinate  50 mg Oral Daily Delmy Grecsek, CRNP      metroNIDAZOLE  500 mg Oral Q8H JOSEFINA Christiano Blakely MD      pantoprazole  40 mg Oral Early Morning Delmy Grecsek, CRNP      ticagrelor  90 mg Oral Q12H JOSEFINA LAUREEN Benavides          Today, Patient Was Seen By: Christiano Blakely MD    **Please Note: This note may have been constructed using a voice recognition system.**

## 2024-07-28 NOTE — ASSESSMENT & PLAN NOTE
Lab Results   Component Value Date    HGBA1C 7.4 (H) 07/25/2024       Recent Labs     07/27/24  1538 07/27/24 2038 07/28/24  0722 07/28/24  1133   POCGLU 176* 148* 131 186*     Resume metformin on discharge

## 2024-07-28 NOTE — UTILIZATION REVIEW
"Continued Stay Review    Observation on 07/24 @ 2038 upgraded to Inpatient on 07/26 @ 1704. Pt requiring continued stay d/t continued monitoring s/p cardiac cath, having apneic episodes, O2 sat is borderline low 89-90, replaced O2 via NC.    Admission Orders (From admission, onward)       Ordered        07/26/24 1704  INPATIENT ADMISSION  Once            07/24/24 2038  Place in Observation  Once                          Orders Placed This Encounter   Procedures    INPATIENT ADMISSION     Standing Status:   Standing     Number of Occurrences:   1     Order Specific Question:   Level of Care     Answer:   Med Surg [16]     Order Specific Question:   Bed Type     Answer:   [place] [4]     Order Specific Question:   Estimated length of stay     Answer:   More than 2 Midnights     Order Specific Question:   Certification     Answer:   I certify that inpatient services are medically necessary for this patient for a duration of greater than two midnights. See H&P and MD Progress Notes for additional information about the patient's course of treatment.     Date:   07/27  Day 2                     Current Patient Class: Inpatient  Current Level of Care: MS    HPI:39 y.o. male initially admitted on 07/26     Assessment/Plan:   07/26: Pt's O2 sat is borderline low 89-90. Having  \"apneic episodes\" and wakes up gasping while she was in the room. Has a BMI of 39, thick neck and received sedating meds for his cath. He may have underlying sleep apnea worsened by meds and post op state. Replace o2 via nasal cannula. Check nocturnal pulse ox. OP sleep study    07/27: Pt reports occasional SOB. Reports dx w/ sleep apnea many years ago. He did not complete CPAP titration when he moved to . He had 16 desaturation events on nocturnal pulse ox last night. Will perform nocturnal pulse oximetry again tonight with 2 L of oxygen. He will need home oxygen at bedtime. He will need OP formal sleep study. Continue aspirin 81 mg daily, " Brilinta 90 mg twice daily, metoprolol 50 mg daily, lisinopril 5 mg daily, Zetia and Lipitor. Continue Flagyl for 5 days total.     Vital Signs (last 3 days)       Date/Time Temp Pulse Resp BP MAP (mmHg) SpO2 Calculated FIO2 (%) - Nasal Cannula Nasal Cannula O2 Flow Rate (L/min) O2 Device Patient Position - Orthostatic VS Seth Coma Scale Score Pain    07/28/24 1054 97.6 °F (36.4 °C) 73 16 96/64 69 99 % -- -- None (Room air) Sitting -- --    07/28/24 0945 -- -- -- -- -- -- -- -- -- -- -- No Pain    07/28/24 0718 97.5 °F (36.4 °C) 65 17 107/66 -- 96 % -- -- None (Room air) Lying -- --    07/28/24 0521 -- -- -- -- -- 98 % 28 2 L/min Nasal cannula -- -- --    07/28/24 0420 -- -- 16 84/62 -- -- -- -- -- Lying -- --    07/28/24 0405 97.8 °F (36.6 °C) 70 16 83/58 63 94 % 28 2 L/min Nasal cannula Lying -- --    07/28/24 0035 97.6 °F (36.4 °C) 70 16 90/56 61 98 % 28 2 L/min Nasal cannula Lying -- --    07/27/24 2233 -- -- -- -- -- 98 % 28 2 L/min Nasal cannula -- -- --    07/27/24 2100 -- -- -- -- -- -- -- -- -- -- 15 No Pain    07/27/24 1859 97.4 °F (36.3 °C) 74 16 101/70 75 97 % -- -- None (Room air) Lying -- --    07/27/24 1534 97.6 °F (36.4 °C) 60 18 114/65 84 95 % -- -- None (Room air) Lying -- --    07/27/24 1041 96.4 °F (35.8 °C) 71 18 116/78 92 97 % -- -- None (Room air) Lying -- --    07/27/24 0910 -- -- -- -- -- -- -- -- -- -- 15 No Pain    07/27/24 0725 96.6 °F (35.9 °C) 67 18 114/75 88 95 % -- -- None (Room air) Lying -- --    07/27/24 0256 97.7 °F (36.5 °C) 71 18 100/56 72 94 % -- -- None (Room air) Lying -- --    07/27/24 0000 -- -- -- -- -- -- -- -- -- -- 15 --    07/26/24 2301 97.8 °F (36.6 °C) 89 18 113/71 85 97 % -- -- None (Room air) Lying -- --    07/26/24 2150 98 °F (36.7 °C) 73 16 119/76 -- 99 % -- -- None (Room air) Lying -- --    07/26/24 2147 -- -- -- -- -- 98 % -- -- None (Room air) -- -- --    07/26/24 2016 -- -- -- -- -- -- -- -- None (Room air) -- 15 No Pain    07/26/24 1950 98.1 °F (36.7 °C)  75 16 110/73 -- 95 % -- -- None (Room air) Lying -- --    07/26/24 1850 -- 72 18 119/78 94 96 % -- -- None (Room air) Lying -- --    07/26/24 1750 -- 72 18 114/72 89 94 % -- -- None (Room air) Lying -- --    07/26/24 1650 -- 69 18 119/77 92 96 % -- -- None (Room air) Lying -- --    07/26/24 1550 -- 79 18 119/68 89 93 % -- -- None (Room air) Lying -- --    07/26/24 1520 -- 77 -- 131/84 103 97 % -- -- None (Room air) Lying -- --    07/26/24 1450 -- 74 18 104/69 83 98 % -- -- None (Room air) Lying -- --    07/26/24 1435 -- 78 18 120/82 97 96 % -- -- None (Room air) Lying -- --    07/26/24 1420 -- 78 18 121/81 98 98 % -- -- None (Room air) Lying -- --    07/26/24 1405 97.2 °F (36.2 °C) 71 18 128/76 95 96 % -- -- None (Room air) Lying -- No Pain    07/26/24 1113 97.6 °F (36.4 °C) 72 18 117/68 76 94 % -- -- None (Room air) Lying 15 No Pain    07/26/24 0805 98.3 °F (36.8 °C) 57 16 109/82 88 93 % -- -- None (Room air) Lying -- --    07/26/24 0305 97.7 °F (36.5 °C) 71 18 96/63 70 93 % -- -- -- Lying -- --    07/25/24 2337 97.6 °F (36.4 °C) 81 18 94/52 61 95 % -- -- -- Lying -- --    07/25/24 1949 -- -- -- -- -- -- -- -- None (Room air) -- 15 No Pain    07/25/24 1911 98 °F (36.7 °C) 79 18 115/79 85 97 % -- -- None (Room air) Sitting -- --    07/25/24 1446 98 °F (36.7 °C) 70 18 120/75 -- 96 % -- -- None (Room air) Lying -- --    07/25/24 1415 -- 70 -- 110/81 -- -- -- -- -- -- -- --    07/25/24 1127 -- 72 18 110/81 -- -- -- -- -- Sitting -- --    07/25/24 0900 -- -- -- -- -- -- -- -- None (Room air) -- 15 No Pain    07/25/24 0752 97.9 °F (36.6 °C) 67 18 115/81 -- 97 % -- -- None (Room air) Lying -- --    07/25/24 0326 97.5 °F (36.4 °C) 70 18 92/54 68 94 % -- -- -- Lying -- --          Weight (last 2 days)       Date/Time Weight    07/28/24 0600 108 (237.44)    07/27/24 0600 109 (239.42)              Pertinent Labs/Diagnostic Results:   Radiology:  XR chest 2 views   ED Interpretation by Daniel Tuttle MD (07/24 1954)    Primary reviewed; No acute abnotrmality      Final Interpretation by Shantal Paredes MD (07/24 2157)      No acute cardiopulmonary disease.            Workstation performed: [id number]         XR chest pa & lateral    (Results Pending)     Cardiology:  Echo complete w/ contrast if indicated   Final Result by Kraig Lackey MD (07/25 1628)        Left Ventricle: Left ventricular cavity size is normal. Wall thickness    is increased. There is mild concentric hypertrophy. The left ventricular    ejection fraction is 64%. Systolic function is normal. Wall motion is    normal. Diastolic function is normal.     Right Ventricle: Right ventricular cavity size is normal. Systolic    function is normal.         ECG 12 lead   Final Result by Ranjan Isabel MD (07/25 0727)   Normal sinus rhythm   Normal ECG   When compared with ECG of 24-JUL-2024 20:40, (unconfirmed)   No significant change was found   Confirmed by Ranjan Isabel ([id number]) on 7/25/2024 7:26:58 AM      ECG 12 lead   Final Result by Ranjan Isabel MD (07/25 0727)    Age and gender specific ECG analysis    Sinus rhythm with sinus arrhythmia   Normal ECG      Confirmed by Ranjan Isabel ([id number]) on 7/25/2024 7:27:44 AM      ECG 12 lead   Final Result by Ranjan Isabel MD (07/25 0729)   Age and gender specific ECG analysis    Normal sinus rhythm   Normal ECG      Confirmed by Ranjan Isabel ([id number]) on 7/25/2024 7:29:55 AM        GI:  No orders to display           Results from last 7 days   Lab Units 07/27/24  0822 07/26/24  0656 07/25/24  0616 07/24/24  1823   WBC Thousand/uL 8.79 5.97 5.31 6.95   HEMOGLOBIN g/dL 14.6 13.7 13.8 13.4   HEMATOCRIT % 43.9 41.4 41.9 39.9   PLATELETS Thousands/uL 266 253 266 284   TOTAL NEUT ABS Thousands/µL  --   --  2.91 4.31         Results from last 7 days   Lab Units 07/27/24  0718 07/26/24  0656 07/25/24  0616 07/24/24  1823   SODIUM mmol/L 136 139 140 137   POTASSIUM mmol/L 4.9 3.8 4.3 3.7   CHLORIDE mmol/L 107 107  "107 105   CO2 mmol/L 22 25 27 24   ANION GAP mmol/L 7 7 6 8   BUN mg/dL 8 9 8 8   CREATININE mg/dL 0.59* 0.58* 0.62 0.79   EGFR ml/min/1.73sq m 126 127 124 112   CALCIUM mg/dL 8.6 8.8 9.0 8.6   MAGNESIUM mg/dL  --   --  2.1  --          Results from last 7 days   Lab Units 07/28/24  1133 07/28/24  0722 07/27/24  2038 07/27/24  1538 07/27/24  1045 07/27/24  0727 07/26/24  2105 07/26/24  1656 07/26/24  1116 07/26/24  0840 07/25/24  2042 07/25/24  1605   POC GLUCOSE mg/dl 186* 131 148* 176* 150* 124 161* 139 128 137 219* 120     Results from last 7 days   Lab Units 07/27/24  0718 07/26/24  0656 07/25/24  0616 07/24/24  1823   GLUCOSE RANDOM mg/dL 125 146* 124 207*         Results from last 7 days   Lab Units 07/25/24  0616   HEMOGLOBIN A1C % 7.4*   EAG mg/dl 166     No results found for: \"BETA-HYDROXYBUTYRATE\"                   Results from last 7 days   Lab Units 07/24/24  2235 07/24/24 2041 07/24/24  1823   HS TNI 0HR ng/L  --   --  6   HS TNI 2HR ng/L  --  5  --    HSTNI D2 ng/L  --  -1  --    HS TNI 4HR ng/L 6  --   --    HSTNI D4 ng/L 0  --   --          Results from last 7 days   Lab Units 07/26/24  0656   PROTIME seconds 13.9   INR  1.05                         Results from last 7 days   Lab Units 07/24/24  1823   BNP pg/mL 30                                                             Results from last 7 days   Lab Units 07/24/24  2314   SALMONELLA SP PCR  Negative   SHIGELLA SP/ENTEROINVASIVE E. COLI (EIEC)  Negative   CAMPYLOBACTER SP (JEJUNI AND COLI)  Negative   SHIGA TOXIN 1/SHIGA TOXIN 2  Negative                           Medications:   Scheduled Medications:  aspirin, 81 mg, Oral, Daily  atorvastatin, 80 mg, Oral, Daily With Dinner  enoxaparin, 40 mg, Subcutaneous, Daily  ezetimibe, 10 mg, Oral, Daily  insulin lispro, 1-6 Units, Subcutaneous, TID AC  insulin lispro, 1-6 Units, Subcutaneous, HS  lisinopril, 5 mg, Oral, Daily  metoprolol succinate, 50 mg, Oral, Daily  metroNIDAZOLE, 500 mg, Oral, Q8H " JOSEFINA  pantoprazole, 40 mg, Oral, Early Morning  ticagrelor, 90 mg, Oral, Q12H JOSEFINA      Continuous IV Infusions:  sodium chloride 0.9 % infusion  Rate: 100 mL/hr Dose: 100 mL/hr  Freq: Continuous Route: IV  Last Dose: 100 mL/hr (07/26/24 1356)  Start: 07/26/24 1330 End: 07/27/24 0155     PRN Meds:       Discharge Plan: TBD    Network Utilization Review Department  ATTENTION: Please call with any questions or concerns to [phone] and carefully listen to the prompts so that you are directed to the right person. All voicemails are confidential.   For Discharge needs, contact Care Management DC Support Team at [phone] opt. [phone]  Send all requests for admission clinical reviews, approved or denied determinations and any other requests to dedicated fax number below belonging to the campus where the patient is receiving treatment. List of dedicated fax numbers for the Facilities:  FACILITY NAME UR FAX NUMBER   ADMISSION DENIALS (Administrative/Medical Necessity) [phone]   DISCHARGE SUPPORT TEAM (NETWORK) [phone]   PARENT CHILD HEALTH (Maternity/NICU/Pediatrics) [phone]   [facility] [phone]   General acute hospital [phone]   [facility] [phone]   [facility] [phone]   [facility] [phone]   [facility] [phone]   [facility] [phone]   [facility] [phone]   [facility] [phone]   [facility] [phone]   [facility] [phone]   [facility] [phone]

## 2024-07-28 NOTE — ASSESSMENT & PLAN NOTE
Reportedly diagnosed with Giardia infection in the [place]  According to his significant other he only completed 3 days of antibiotic there.  It is unclear which antibiotic he actually got.  Continue Flagyl for 2 more doses to complete 5 days of treatment here.

## 2024-07-28 NOTE — ASSESSMENT & PLAN NOTE
Status post cardiac cath on 7/26 confirming critical focal lesion in the proximal left circumflex just prior to the previously placed stent and critical long lesion in the distal circumflex.  Both were treated with drug-eluting stents  Continue aspirin 81 mg daily, Brilinta 90 mg twice daily, metoprolol 50 mg daily, lisinopril 5 mg daily, Zetia and Lipitor  Emphasized need for weight loss   Outpatient cardiac rehab

## 2024-07-28 NOTE — PLAN OF CARE

## 2024-07-29 VITALS
DIASTOLIC BLOOD PRESSURE: 76 MMHG | OXYGEN SATURATION: 98 % | TEMPERATURE: 97.5 F | HEART RATE: 96 BPM | BODY MASS INDEX: 38.86 KG/M2 | HEIGHT: 65 IN | SYSTOLIC BLOOD PRESSURE: 123 MMHG | WEIGHT: 233.25 LBS | RESPIRATION RATE: 18 BRPM

## 2024-07-29 LAB
GLUCOSE SERPL-MCNC: 176 MG/DL (ref 65–140)
GLUCOSE SERPL-MCNC: 199 MG/DL (ref 65–140)

## 2024-07-29 PROCEDURE — 82948 REAGENT STRIP/BLOOD GLUCOSE: CPT

## 2024-07-29 PROCEDURE — 99239 HOSP IP/OBS DSCHRG MGMT >30: CPT | Performed by: FAMILY MEDICINE

## 2024-07-29 RX ORDER — METOPROLOL SUCCINATE 50 MG/1
50 TABLET, EXTENDED RELEASE ORAL DAILY
Qty: 30 TABLET | Refills: 0 | Status: SHIPPED | OUTPATIENT
Start: 2024-07-29

## 2024-07-29 RX ORDER — PANTOPRAZOLE SODIUM 40 MG/1
40 TABLET, DELAYED RELEASE ORAL
Qty: 30 TABLET | Refills: 0 | Status: SHIPPED | OUTPATIENT
Start: 2024-07-29

## 2024-07-29 RX ORDER — GLIPIZIDE 5 MG/1
5 TABLET, FILM COATED, EXTENDED RELEASE ORAL DAILY
Qty: 30 TABLET | Refills: 0 | Status: SHIPPED | OUTPATIENT
Start: 2024-07-29

## 2024-07-29 RX ORDER — ATORVASTATIN CALCIUM 80 MG/1
80 TABLET, FILM COATED ORAL
Qty: 30 TABLET | Refills: 1 | Status: SHIPPED | OUTPATIENT
Start: 2024-07-29

## 2024-07-29 RX ORDER — EZETIMIBE 10 MG/1
10 TABLET ORAL DAILY
Qty: 30 TABLET | Refills: 0 | Status: SHIPPED | OUTPATIENT
Start: 2024-07-29

## 2024-07-29 RX ORDER — ENALAPRIL MALEATE 2.5 MG/1
2.5 TABLET ORAL DAILY
Qty: 30 TABLET | Refills: 0 | Status: SHIPPED | OUTPATIENT
Start: 2024-07-29

## 2024-07-29 RX ADMIN — PANTOPRAZOLE SODIUM 40 MG: 40 TABLET, DELAYED RELEASE ORAL at 05:12

## 2024-07-29 RX ADMIN — ENOXAPARIN SODIUM 40 MG: 40 INJECTION SUBCUTANEOUS at 08:31

## 2024-07-29 RX ADMIN — LISINOPRIL 5 MG: 5 TABLET ORAL at 08:31

## 2024-07-29 RX ADMIN — TICAGRELOR 90 MG: 90 TABLET ORAL at 08:31

## 2024-07-29 RX ADMIN — ATORVASTATIN CALCIUM 80 MG: 80 TABLET, FILM COATED ORAL at 15:48

## 2024-07-29 RX ADMIN — TICAGRELOR 90 MG: 90 TABLET ORAL at 15:48

## 2024-07-29 RX ADMIN — METOPROLOL SUCCINATE 50 MG: 50 TABLET, EXTENDED RELEASE ORAL at 08:31

## 2024-07-29 RX ADMIN — INSULIN LISPRO 2 UNITS: 100 INJECTION, SOLUTION INTRAVENOUS; SUBCUTANEOUS at 12:04

## 2024-07-29 RX ADMIN — ASPIRIN 81 MG: 81 TABLET, COATED ORAL at 08:31

## 2024-07-29 RX ADMIN — EZETIMIBE 10 MG: 10 TABLET ORAL at 08:31

## 2024-07-29 RX ADMIN — INSULIN LISPRO 1 UNITS: 100 INJECTION, SOLUTION INTRAVENOUS; SUBCUTANEOUS at 08:32

## 2024-07-29 NOTE — ASSESSMENT & PLAN NOTE
PMHx of MI 2014, S/p cardiac cath, 100% stenosis OM, 95% stenosis mid circumflex, s/p JESUS MANUEL x2  Pt last admitted in 2019 for chest pain with cardiac cath performed, no intervention required  At the time: patent circumflex. unchanged moderate nonobstructive distal circumflex (diffuse 50% stenosis) and proximal RCA disease (diffuse 60% stenosis), 40% stenosis distal RCA   Pt determined to have pericarditis and treated for this at that time  Pt reports compliance with aspirin, statin, and BB therapy although he has not been seen since 11/2019. No follow-up after discharge.  Brilinta added to therapy  [facility]

## 2024-07-29 NOTE — PLAN OF CARE

## 2024-07-29 NOTE — UTILIZATION REVIEW
NOTIFICATION OF INPATIENT ADMISSION   AUTHORIZATION REQUEST   SERVICING FACILITY:   [place] [address]  Tax ID: [id number]  NPI: [id number] ATTENDING PROVIDER:  Attending Name and NPI#: Janene Almonte Md [[id number]]  Address: [address] [place] [address]  Phone: [phone]     ADMISSION INFORMATION:  Place of Service: Inpatient [facility]  Place of Service Code: 21  Inpatient Admission Date/Time: 7/26/24  5:04 PM  Discharge Date/Time: No discharge date for patient encounter.  Admitting Diagnosis Code/Description:  Coronary artery disease [I25.10]  Chest pain [R07.9]  Chest pain, rule out acute myocardial infarction [R07.9]     UTILIZATION REVIEW CONTACT:  Kaykay Mendoza Utilization   Network Utilization Review Department  Phone: [phone]  Fax [phone]  Email: [email or web address]  Contact for approvals/pending authorizations, clinical reviews, and discharge.     PHYSICIAN ADVISORY SERVICES:  Medical Necessity Denial & Tzyh-ql-Ukme Review  Phone: [phone]  Fax: [phone]  Email: [email or web address]     DISCHARGE SUPPORT TEAM:  For Patients Discharge Needs & Updates  Phone: [phone] opt. 2 Fax: [phone]  Email: [email or web address]      Mercedes Schwarz RN   Registered Nurse  Specialty: Medical Surgical     Utilization Review      Signed     Date of Service: 7/25/2024  1:53 PM     Signed       Expand All Collapse All    Initial Clinical Review     Admission: Date/Time/Statement:   Admission Orders (From admission, onward)          Ordered         07/24/24 2038   Place in Observation  Once                                     Orders Placed This Encounter   Procedures    Place in Observation       Standing Status:   Standing       Number of Occurrences:   1       Order Specific Question:   Level of Care       Answer:   Med Surg [16]      ED Arrival  Information         Expected   -    Arrival   7/24/2024 18:02    Acuity   Urgent                 Means of arrival   Walk-In    Escorted by   Family Member    Service   Hospitalist    Admission type   Emergency                 Arrival complaint   chest pain                     Chief Complaint   Patient presents with    Chest Pain       Pt reports intermittent chest pain that started a month ago but over the last week has been constant. Pt describes pain as pressure that comes on strong with numbness to L arm,SOB, dizziness         Initial Presentation: 39 y.o. male presents to the ED from home with exertional CP, SOB, dizzness, dyspnea, diaphoresis intermittently for a month worsening in last week.  Symptoms resolve with rest.  Was just hospitalized in the [facility] for cardiac work up and echo.  Returned home on d/c .  Notes bilat pedal edema, now resolved. Also notes diarrhea, dx with Giardia and started on Flagyl TID.  PMH: HTN, NIDDM, hypercholesterolemia, CAD, diarrhea.  Labs - elevated glucose 207, negative troponins.  Imaging - no acute disease.  ECG - NSR.  Treated with ASA.  On exam normal heart, lung and abd sounds.  Admitted to OBSERVATION status with Exertional CP, CAD - NICK score 4,  tele, lipid profile, cardio consult, Echo.  Stool cultures, Flagyl continues, SSI cover, continue Lisinopril, Metoprolol, statin.       Anticipated Length of Stay/Certification Statement: Patient will be admitted on an observation basis with an anticipated length of stay of less than 2 midnights secondary to chest pain.      Date: 7/25  OBS Day 2:   VS stable.  No med changes, no c/o CP.      7/25 Cardic Consult - Exertional CP, CAD, HTN, symptoms c/w stable angina, troponins negative , no ECG changes.  Abnormal echo in . Continue with ASA, Lisinopril, Toprol Xk, statin, Zetia, will do L heart cath on 7/26. NPO p MN, Echo.                  ED Triage Vitals   Temperature Pulse Respirations Blood Pressure SpO2 Pain  Score   07/24/24 1820 07/24/24 1814 07/24/24 1814 07/24/24 1814 07/24/24 1814 07/24/24 1930   98.1 °F (36.7 °C) 87 20 109/67 97 % 4      Weight (last 2 days)         Date/Time Weight     07/24/24 1814 108 (238.76)                Vital Signs (last 3 days)         Date/Time Temp Pulse Resp BP MAP (mmHg) SpO2 O2 Device Patient Position - Orthostatic VS [place] Coma Scale Score Pain     07/25/24 1127 -- 72 18 110/81 -- -- -- Sitting -- --     07/25/24 0900 -- -- -- -- -- -- None (Room air) -- 15 No Pain     07/25/24 0752 97.9 °F (36.6 °C) 67 18 115/81 -- 97 % None (Room air) Lying -- --     07/25/24 0326 97.5 °F (36.4 °C) 70 18 92/54 68 94 % -- Lying -- --     07/24/24 2350 97.3 °F (36.3 °C) 79 18 111/57 88 95 % -- Lying -- --     07/24/24 2334 -- -- -- -- -- -- None (Room air) -- 15 --     07/24/24 2137 -- -- -- -- -- -- -- -- -- 3     07/24/24 2126 97.8 °F (36.6 °C) 68 18 121/81 88 96 % None (Room air) Lying -- --     07/24/24 2100 -- 68 20 119/72 91 96 % None (Room air) Lying -- --     07/24/24 2000 -- 72 20 124/71 91 96 % None (Room air) Lying -- --     07/24/24 1930 -- -- -- -- -- -- -- -- -- 4     07/24/24 1838 -- -- -- -- -- -- -- -- 15 --     07/24/24 1837 -- -- -- -- -- -- None (Room air) -- -- --     07/24/24 1820 98.1 °F (36.7 °C) -- -- -- -- -- -- -- -- --     07/24/24 1814 -- 87 20 109/67 -- 97 % None (Room air) Sitting -- --                   Pertinent Labs/Diagnostic Test Results:   Radiology:  XR chest 2 views   ED Interpretation by Daniel Tuttle MD (07/24 1954)   Primary reviewed; No acute abnotrmality       Final Interpretation by Shantal Paredes MD (07/24 2157)       No acute cardiopulmonary disease.               Workstation performed: [id number]              Cardiology:  ECG 12 lead   Final Result by Ranjan Isabel MD (07/25 0727)   Normal sinus rhythm   Normal ECG   When compared with ECG of 24-JUL-2024 20:40, (unconfirmed)   No significant change was found   Confirmed by Ranjan Isabel  ([id number]) on 7/25/2024 7:26:58 AM       ECG 12 lead   Final Result by Ranjan Isabel MD (07/25 0727)   Age and gender specific ECG analysis    Sinus rhythm with sinus arrhythmia   Normal ECG       Confirmed by Ranjan Isabel ([id number]) on 7/25/2024 7:27:44 AM       ECG 12 lead   Final Result by Ranjan Isabel MD (07/25 0729)   Age and gender specific ECG analysis    Normal sinus rhythm   Normal ECG       Confirmed by Ranjan Isabel ([id number]) on 7/25/2024 7:29:55 AM          GI:  No orders to display                   Results from last 7 days   Lab Units 07/25/24  0616 07/24/24  1823   WBC Thousand/uL 5.31 6.95   HEMOGLOBIN g/dL 13.8 13.4   HEMATOCRIT % 41.9 39.9   PLATELETS Thousands/uL 266 284   TOTAL NEUT ABS Thousands/µL 2.91 4.31                Results from last 7 days   Lab Units 07/25/24  0616 07/24/24  1823   SODIUM mmol/L 140 137   POTASSIUM mmol/L 4.3 3.7   CHLORIDE mmol/L 107 105   CO2 mmol/L 27 24   ANION GAP mmol/L 6 8   BUN mg/dL 8 8   CREATININE mg/dL 0.62 0.79   EGFR ml/min/1.73sq m 124 112   CALCIUM mg/dL 9.0 8.6   MAGNESIUM mg/dL 2.1  --                  Results from last 7 days   Lab Units 07/25/24  1124 07/25/24  0755 07/24/24  2238   POC GLUCOSE mg/dl 178* 135 97            Results from last 7 days   Lab Units 07/25/24  0616 07/24/24  1823   GLUCOSE RANDOM mg/dL 124 207*               Results from last 7 days   Lab Units 07/25/24  0616   HEMOGLOBIN A1C % 7.4*   EAG mg/dl 166              Results from last 7 days   Lab Units 07/24/24  2235 07/24/24  2041 07/24/24  1823   HS TNI 0HR ng/L  --   --  6   HS TNI 2HR ng/L  --  5  --    HSTNI D2 ng/L  --  -1  --    HS TNI 4HR ng/L 6  --   --    HSTNI D4 ng/L 0  --   --                Results from last 7 days   Lab Units 07/24/24  1823   BNP pg/mL 30      ED Treatment-Medication Administration from 07/24/2024 1802 to 07/24/2024 2118           Date/Time Order Dose Route Action       07/24/2024 1851 aspirin chewable tablet 324 mg 324 mg Oral Given                 Medical History        Past Medical History:   Diagnosis Date    Cardiac disease      Diabetes mellitus (HCC)      Hypertension           Present on Admission:   Type 2 diabetes mellitus with hyperglycemia, without long-term current use of insulin (HCC)   Essential hypertension   Hypercholesterolemia        Admitting Diagnosis: Coronary artery disease [I25.10]  Chest pain [R07.9]  Chest pain, rule out acute myocardial infarction [R07.9]  Age/Sex: 39 y.o. male  Admission Orders:  Scheduled Medications:    Scheduled Medications ONLY (does not pull in infusions nor PRN medications order   aspirin, 81 mg, Oral, Daily  atorvastatin, 40 mg, Oral, Daily With Dinner  enoxaparin, 40 mg, Subcutaneous, Daily  ezetimibe, 10 mg, Oral, Daily  insulin lispro, 1-6 Units, Subcutaneous, TID AC  insulin lispro, 1-6 Units, Subcutaneous, HS  [START ON 7/27/2024] lisinopril, 5 mg, Oral, Daily  metoprolol succinate, 50 mg, Oral, Daily  metroNIDAZOLE, 500 mg, Oral, Q8H JOSEFINA  pantoprazole, 40 mg, Oral, Early Morning         Continuous IV Infusions:  Infusions Meds - Displays dose, route, & frequency only         PRN Meds:  PRN Medications - displays dose, route, and frequency         Tele  Stool studies  ASA  Statin  POC GLUCOSE AC/HS WITH SSI COVERAGE   PO Flagyl  ADA diet w/ fluid restriction   Echo  IP CONSULT TO CARDIOLOGY     Network Utilization Review Department  ATTENTION: Please call with any questions or concerns to [phone] and carefully listen to the prompts so that you are directed to the right person. All voicemails are confidential.   For Discharge needs, contact Care Management DC Support Team at [phone] opt. 2  Send all requests for admission clinical reviews, approved or denied determinations and any other requests to dedicated fax number below belonging to the campus where the patient is receiving treatment. List of dedicated fax numbers for the Facilities:  FACILITY NAME UR FAX NUMBER   ADMISSION DENIALS  "(Administrative/Medical Necessity) [phone]   DISCHARGE SUPPORT TEAM (NETWORK) [phone]   PARENT CHILD HEALTH (Maternity/NICU/Pediatrics) [phone]   [facility] [phone]   [facility] [phone]   [facility] [phone]   [facility] [phone]   [facility] [phone]   [facility] [phone]   [facility] [phone]   [facility] [phone]   [facility] [phone]   [facility] [phone]   [facility] [phone]   [facility] [phone]                              Carola Hyde RN   Registered Nurse  Specialty: Utilization Review     Utilization Review      Addendum     Date of Service: 7/26/2024  9:52 AM     Expand All Collapse All    Continued Stay Review     Date: 7/26                           Current Patient Class: Observation                  Current Level of Care: med surg     HPI:39 y.o. male initially admitted on 7/24 with exertional CP, SOB, dizzness, dyspnea, diaphoresis intermittently for a month worsening in last week. Symptoms resolve with rest. Was just hospitalized in the [facility] for cardiac work up and echo. Returned home on d/c       Cardiology   Cardiac cath performed via the R radial artery.  Critical focal lesion in proximal L Cx just prior to the previously placed stent in the mid L Cx.  Successful PCI with placement of a 3.25 x 12 mm stent.  Critical long lesion in the distal l Cx. Successful PCI with placement of a 2.25 x 30 mm JESUS MANUEL.     Attending  No CP, brief shortness of breath described as \" forced breathing\" has not resolved. .cath as above  Continue aspirin 81 mg daily, Brilinta 90 mg twice daily, " metoprolol 50 mg daily, lisinopril 5 mg daily,          Zetia 10 mg daily, Lipitor 80 mg daily  Recommended weight loss  Outpatient cardiac rehab  Vital Signs (last 3 days)         Date/Time Temp Pulse Resp BP MAP (mmHg) SpO2 O2 Device Patient Position - Orthostatic VS [place] Coma Scale Score Pain     07/26/24 1650 -- 69 18 119/77 92 96 % None (Room air) Lying -- --     07/26/24 1550 -- 79 18 119/68 89 93 % None (Room air) Lying -- --     07/26/24 1520 -- 77 -- 131/84 103 97 % None (Room air) Lying -- --     07/26/24 1450 -- 74 18 104/69 83 98 % None (Room air) Lying -- --     07/26/24 1435 -- 78 18 120/82 97 96 % None (Room air) Lying -- --     07/26/24 1420 -- 78 18 121/81 98 98 % None (Room air) Lying -- --     07/26/24 1405 97.2 °F (36.2 °C) 71 18 128/76 95 96 % None (Room air) Lying -- No Pain     07/26/24 1113 97.6 °F (36.4 °C) 72 18 117/68 76 94 % None (Room air) Lying 15 No Pain     07/26/24 0805 98.3 °F (36.8 °C) 57 16 109/82 88 93 % None (Room air) Lying -- --     07/26/24 0305 97.7 °F (36.5 °C) 71 18 96/63 70 93 % -- Lying -- --     07/25/24 2337 97.6 °F (36.4 °C) 81 18 94/52 61 95 % -- Lying -- --     07/25/24 1949 -- -- -- -- -- -- None (Room air) -- 15 No Pain     07/25/24 1911 98 °F (36.7 °C) 79 18 115/79 85 97 % None (Room air) Sitting -- --     07/25/24 1446 98 °F (36.7 °C) 70 18 120/75 -- 96 % None (Room air) Lying -- --     07/25/24 1415 -- 70 -- 110/81 -- -- -- -- -- --     07/25/24 1127 -- 72 18 110/81 -- -- -- Sitting -- --     07/25/24 0900 -- -- -- -- -- -- None (Room air) -- 15 No Pain     07/25/24 0752 97.9 °F (36.6 °C) 67 18 115/81 -- 97 % None (Room air) Lying -- --     07/25/24 0326 97.5 °F (36.4 °C) 70 18 92/54 68 94 % -- Lying -- --     07/24/24 2350 97.3 °F (36.3 °C) 79 18 111/57 88 95 % -- Lying -- --     07/24/24 2334 -- -- -- -- -- -- None (Room air) -- 15 --     07/24/24 2137 -- -- -- -- -- -- -- -- -- 3     07/24/24 2126 97.8 °F (36.6 °C) 68 18 121/81 88 96 % None (Room air)  Lying -- --     07/24/24 2100 -- 68 20 119/72 91 96 % None (Room air) Lying -- --     07/24/24 2000 -- 72 20 124/71 91 96 % None (Room air) Lying -- --     07/24/24 1930 -- -- -- -- -- -- -- -- -- 4     07/24/24 1838 -- -- -- -- -- -- -- -- 15 --     07/24/24 1837 -- -- -- -- -- -- None (Room air) -- -- --     07/24/24 1820 98.1 °F (36.7 °C) -- -- -- -- -- -- -- -- --     07/24/24 1814 -- 87 20 109/67 -- 97 % None (Room air) Sitting -- --             Weight (last 2 days)         Date/Time Weight     07/25/24 1415 108 (238)     07/24/24 1814 108 (238.76)                   Pertinent Labs/Diagnostic Results:   Radiology:  XR chest 2 views   ED Interpretation by Daniel Tuttle MD (07/24 1954)   Primary reviewed; No acute abnotrmality       Final Interpretation by Shantal Paredes MD (07/24 2157)       No acute cardiopulmonary disease.               Workstation performed: [id number]              Cardiology:  Echo complete w/ contrast if indicated   Final Result by Kraig Lackey MD (07/25 1628)         Left Ventricle: Left ventricular cavity size is normal. Wall thickness    is increased. There is mild concentric hypertrophy. The left ventricular    ejection fraction is 64%. Systolic function is normal. Wall motion is    normal. Diastolic function is normal.     Right Ventricle: Right ventricular cavity size is normal. Systolic    function is normal.           ECG 12 lead   Final Result by Ranjan Isabel MD (07/25 0727)   Normal sinus rhythm   Normal ECG   When compared with ECG of 24-JUL-2024 20:40, (unconfirmed)   No significant change was found   Confirmed by Ranjan Isabel ([id number]) on 7/25/2024 7:26:58 AM       ECG 12 lead   Final Result by Ranjan Isabel MD (07/25 0727)   * Age and gender specific ECG analysis *   Sinus rhythm with sinus arrhythmia   Normal ECG       Confirmed by Ranjan Isabel ([id number]) on 7/25/2024 7:27:44 AM       ECG 12 lead   Final Result by Ranjan Isabel MD (07/25 0729)   ** Age  "and gender specific ECG analysis **   Normal sinus rhythm   Normal ECG       Confirmed by Ranjan Isabel ([id number]) on 7/25/2024 7:29:55 AM          GI:  No orders to display                    Results from last 7 days   Lab Units 07/26/24  0656 07/25/24  0616 07/24/24  1823   WBC Thousand/uL 5.97 5.31 6.95   HEMOGLOBIN g/dL 13.7 13.8 13.4   HEMATOCRIT % 41.4 41.9 39.9   PLATELETS Thousands/uL 253 266 284   TOTAL NEUT ABS Thousands/µL  --  2.91 4.31                 Results from last 7 days   Lab Units 07/26/24  0656 07/25/24  0616 07/24/24  1823   SODIUM mmol/L 139 140 137   POTASSIUM mmol/L 3.8 4.3 3.7   CHLORIDE mmol/L 107 107 105   CO2 mmol/L 25 27 24   ANION GAP mmol/L 7 6 8   BUN mg/dL 9 8 8   CREATININE mg/dL 0.58* 0.62 0.79   EGFR ml/min/1.73sq m 127 124 112   CALCIUM mg/dL 8.8 9.0 8.6   MAGNESIUM mg/dL  --  2.1  --                      Results from last 7 days   Lab Units 07/26/24  1116 07/26/24  0840 07/25/24  2042 07/25/24  1605 07/25/24  1124 07/25/24  0755 07/24/24  2238   POC GLUCOSE mg/dl 128 137 219* 120 178* 135 97             Results from last 7 days   Lab Units 07/26/24  0656 07/25/24  0616 07/24/24  1823   GLUCOSE RANDOM mg/dL 146* 124 207*               Results from last 7 days   Lab Units 07/25/24  0616   HEMOGLOBIN A1C % 7.4*   EAG mg/dl 166      No results found for: \"BETA-HYDROXYBUTYRATE\"                          Results from last 7 days   Lab Units 07/24/24  2235 07/24/24  2041 07/24/24  1823   HS TNI 0HR ng/L  --   --  6   HS TNI 2HR ng/L  --  5  --    HSTNI D2 ng/L  --  -1  --    HS TNI 4HR ng/L 6  --   --    HSTNI D4 ng/L 0  --   --                Results from last 7 days   Lab Units 07/26/24  0656   PROTIME seconds 13.9   INR   1.05                               Results from last 7 days   Lab Units 07/24/24  1823   BNP pg/mL 30                                                                   Results from last 7 days   Lab Units 07/24/24  2314   SALMONELLA SP PCR   Negative   SHIGELLA " SP/ENTEROINVASIVE E. COLI (EIEC)   Negative   CAMPYLOBACTER SP (JEJUNI AND COLI)   Negative   SHIGA TOXIN 1/SHIGA TOXIN 2   Negative                             Medications:   Scheduled Medications:    Scheduled Medications ONLY (does not pull in infusions nor PRN medications order   aspirin, 81 mg, Oral, Daily  atorvastatin, 80 mg, Oral, Daily With Dinner  enoxaparin, 40 mg, Subcutaneous, Daily  ezetimibe, 10 mg, Oral, Daily  insulin lispro, 1-6 Units, Subcutaneous, TID AC  insulin lispro, 1-6 Units, Subcutaneous, HS  [START ON 7/27/2024] lisinopril, 5 mg, Oral, Daily  metoprolol succinate, 50 mg, Oral, Daily  metroNIDAZOLE, 500 mg, Oral, Q8H JOSEFINA  pantoprazole, 40 mg, Oral, Early Morning  ticagrelor, 90 mg, Oral, Q12H JOSEFINA         Continuous IV Infusions:    Infusions Meds - Displays dose, route, & frequency only   sodium chloride, 100 mL/hr, Intravenous, Continuous         PRN Meds:  PRN Medications - displays dose, route, and frequency            Discharge Plan: D     Network Utilization Review Department  ATTENTION: Please call with any questions or concerns to [phone] and carefully listen to the prompts so that you are directed to the right person. All voicemails are confidential.   For Discharge needs, contact Care Management DC Support Team at [phone] opt. 2  Send all requests for admission clinical reviews, approved or denied determinations and any other requests to dedicated fax number below belonging to the campus where the patient is receiving treatment. List of dedicated fax numbers for the Facilities:  FACILITY NAME UR FAX NUMBER   ADMISSION DENIALS (Administrative/Medical Necessity) [phone]   DISCHARGE SUPPORT TEAM (NETWORK) [phone]   PARENT CHILD HEALTH (Maternity/NICU/Pediatrics) [phone]   [facility] [phone]   [facility] [phone]   [facility] [phone]   [facility] [phone]   [facility] [phone]   [facility] [phone]   [facility] [phone]   [facility] [phone]   [facility] [phone]   [facility] [phone]   [facility] [phone]   [facility] [phone]               Revision History            Steven Zapien RN   Registered Nurse  Specialty: Utilization Review     Utilization Review      Signed     Date of Service: 7/28/2024 12:40 PM     Signed       Expand All Collapse All    Continued Stay Review     Observation on 07/24 @ 2038 upgraded to Inpatient on 07/26 @ 1704. Pt requiring continued stay d/t continued monitoring s/p cardiac cath, having apneic episodes, O2 sat is borderline low 89-90, replaced O2 via NC.     Admission Orders (From admission, onward)          Ordered         07/26/24 1704   INPATIENT ADMISSION  Once             07/24/24 2038   Place in Observation  Once                                     Orders Placed This Encounter   Procedures    INPATIENT ADMISSION       Standing Status:   Standing       Number of Occurrences:   1       Order Specific Question:   Level of Care       Answer:   Med Surg [16]       Order Specific Question:   Bed Type       Answer:   [place] [4]       Order Specific Question:   Estimated length of stay       Answer:   More than 2 Midnights       Order Specific Question:   Certification       Answer:   I certify that inpatient services are medically necessary for this patient for a duration of greater than two midnights. See H&P and MD Progress Notes for additional information about the patient's course of treatment.      Date:   07/27  Day 2                      Current Patient Class: Inpatient                   Current Level of Care: MS    "  HPI:39 y.o. male initially admitted on 07/26      Assessment/Plan:   07/26: Pt's O2 sat is borderline low 89-90. Having  \"apneic episodes\" and wakes up gasping while she was in the room. Has a BMI of 39, thick neck and received sedating meds for his cath. He may have underlying sleep apnea worsened by meds and post op state. Replace o2 via nasal cannula. Check nocturnal pulse ox. OP sleep study     07/27: Pt reports occasional SOB. Reports dx w/ sleep apnea many years ago. He did not complete CPAP titration when he moved to . He had 16 desaturation events on nocturnal pulse ox last night. Will perform nocturnal pulse oximetry again tonight with 2 L of oxygen. He will need home oxygen at bedtime. He will need OP formal sleep study. Continue aspirin 81 mg daily, Brilinta 90 mg twice daily, metoprolol 50 mg daily, lisinopril 5 mg daily, Zetia and Lipitor. Continue Flagyl for 5 days total.      Vital Signs (last 3 days)         Date/Time Temp Pulse Resp BP MAP (mmHg) SpO2 Calculated FIO2 (%) - Nasal Cannula Nasal Cannula O2 Flow Rate (L/min) O2 Device Patient Position - Orthostatic VS [place] Coma Scale Score Pain     07/28/24 1054 97.6 °F (36.4 °C) 73 16 96/64 69 99 % -- -- None (Room air) Sitting -- --     07/28/24 0945 -- -- -- -- -- -- -- -- -- -- -- No Pain     07/28/24 0718 97.5 °F (36.4 °C) 65 17 107/66 -- 96 % -- -- None (Room air) Lying -- --     07/28/24 0521 -- -- -- -- -- 98 % 28 2 L/min Nasal cannula -- -- --     07/28/24 0420 -- -- 16 84/62 -- -- -- -- -- Lying -- --     07/28/24 0405 97.8 °F (36.6 °C) 70 16 83/58 63 94 % 28 2 L/min Nasal cannula Lying -- --     07/28/24 0035 97.6 °F (36.4 °C) 70 16 90/56 61 98 % 28 2 L/min Nasal cannula Lying -- --     07/27/24 2233 -- -- -- -- -- 98 % 28 2 L/min Nasal cannula -- -- --     07/27/24 2100 -- -- -- -- -- -- -- -- -- -- 15 No Pain     07/27/24 1859 97.4 °F (36.3 °C) 74 16 101/70 75 97 % -- -- None (Room air) Lying -- --     07/27/24 1534 97.6 °F (36.4 °C) " 60 18 114/65 84 95 % -- -- None (Room air) Lying -- --     07/27/24 1041 96.4 °F (35.8 °C) 71 18 116/78 92 97 % -- -- None (Room air) Lying -- --     07/27/24 0910 -- -- -- -- -- -- -- -- -- -- 15 No Pain     07/27/24 0725 96.6 °F (35.9 °C) 67 18 114/75 88 95 % -- -- None (Room air) Lying -- --     07/27/24 0256 97.7 °F (36.5 °C) 71 18 100/56 72 94 % -- -- None (Room air) Lying -- --     07/27/24 0000 -- -- -- -- -- -- -- -- -- -- 15 --     07/26/24 2301 97.8 °F (36.6 °C) 89 18 113/71 85 97 % -- -- None (Room air) Lying -- --     07/26/24 2150 98 °F (36.7 °C) 73 16 119/76 -- 99 % -- -- None (Room air) Lying -- --     07/26/24 2147 -- -- -- -- -- 98 % -- -- None (Room air) -- -- --     07/26/24 2016 -- -- -- -- -- -- -- -- None (Room air) -- 15 No Pain     07/26/24 1950 98.1 °F (36.7 °C) 75 16 110/73 -- 95 % -- -- None (Room air) Lying -- --     07/26/24 1850 -- 72 18 119/78 94 96 % -- -- None (Room air) Lying -- --     07/26/24 1750 -- 72 18 114/72 89 94 % -- -- None (Room air) Lying -- --     07/26/24 1650 -- 69 18 119/77 92 96 % -- -- None (Room air) Lying -- --     07/26/24 1550 -- 79 18 119/68 89 93 % -- -- None (Room air) Lying -- --     07/26/24 1520 -- 77 -- 131/84 103 97 % -- -- None (Room air) Lying -- --     07/26/24 1450 -- 74 18 104/69 83 98 % -- -- None (Room air) Lying -- --     07/26/24 1435 -- 78 18 120/82 97 96 % -- -- None (Room air) Lying -- --     07/26/24 1420 -- 78 18 121/81 98 98 % -- -- None (Room air) Lying -- --     07/26/24 1405 97.2 °F (36.2 °C) 71 18 128/76 95 96 % -- -- None (Room air) Lying -- No Pain     07/26/24 1113 97.6 °F (36.4 °C) 72 18 117/68 76 94 % -- -- None (Room air) Lying 15 No Pain     07/26/24 0805 98.3 °F (36.8 °C) 57 16 109/82 88 93 % -- -- None (Room air) Lying -- --     07/26/24 0305 97.7 °F (36.5 °C) 71 18 96/63 70 93 % -- -- -- Lying -- --     07/25/24 2337 97.6 °F (36.4 °C) 81 18 94/52 61 95 % -- -- -- Lying -- --     07/25/24 1949 -- -- -- -- -- -- -- -- None  (Room air) -- 15 No Pain     07/25/24 1911 98 °F (36.7 °C) 79 18 115/79 85 97 % -- -- None (Room air) Sitting -- --     07/25/24 1446 98 °F (36.7 °C) 70 18 120/75 -- 96 % -- -- None (Room air) Lying -- --     07/25/24 1415 -- 70 -- 110/81 -- -- -- -- -- -- -- --     07/25/24 1127 -- 72 18 110/81 -- -- -- -- -- Sitting -- --     07/25/24 0900 -- -- -- -- -- -- -- -- None (Room air) -- 15 No Pain     07/25/24 0752 97.9 °F (36.6 °C) 67 18 115/81 -- 97 % -- -- None (Room air) Lying -- --     07/25/24 0326 97.5 °F (36.4 °C) 70 18 92/54 68 94 % -- -- -- Lying -- --             Weight (last 2 days)         Date/Time Weight     07/28/24 0600 108 (237.44)     07/27/24 0600 109 (239.42)                   Pertinent Labs/Diagnostic Results:   Radiology:  XR chest 2 views   ED Interpretation by Daniel Tuttle MD (07/24 1954)   Primary reviewed; No acute abnotrmality       Final Interpretation by Shantal Paredes MD (07/24 2157)       No acute cardiopulmonary disease.               Workstation performed: [id number]           XR chest pa & lateral    (Results Pending)      Cardiology:  Echo complete w/ contrast if indicated   Final Result by Kraig Lackey MD (07/25 1628)         Left Ventricle: Left ventricular cavity size is normal. Wall thickness    is increased. There is mild concentric hypertrophy. The left ventricular    ejection fraction is 64%. Systolic function is normal. Wall motion is    normal. Diastolic function is normal.     Right Ventricle: Right ventricular cavity size is normal. Systolic    function is normal.           ECG 12 lead   Final Result by Ranjan Isabel MD (07/25 0727)   Normal sinus rhythm   Normal ECG   When compared with ECG of 24-JUL-2024 20:40, (unconfirmed)   No significant change was found   Confirmed by Ranjan Isabel ([id number]) on 7/25/2024 7:26:58 AM       ECG 12 lead   Final Result by Ranjan Isabel MD (07/25 0727)    Age and gender specific ECG analysis    Sinus rhythm with  "sinus arrhythmia   Normal ECG       Confirmed by Ranjan Isabel ([id number]) on 7/25/2024 7:27:44 AM       ECG 12 lead   Final Result by Ranjan Isabel MD (07/25 0729)   Age and gender specific ECG analysis    Normal sinus rhythm   Normal ECG       Confirmed by Ranjan Isabel ([id number]) on 7/25/2024 7:29:55 AM          GI:  No orders to display                     Results from last 7 days   Lab Units 07/27/24  0822 07/26/24  0656 07/25/24  0616 07/24/24  1823   WBC Thousand/uL 8.79 5.97 5.31 6.95   HEMOGLOBIN g/dL 14.6 13.7 13.8 13.4   HEMATOCRIT % 43.9 41.4 41.9 39.9   PLATELETS Thousands/uL 266 253 266 284   TOTAL NEUT ABS Thousands/µL  --   --  2.91 4.31                  Results from last 7 days   Lab Units 07/27/24  0718 07/26/24  0656 07/25/24  0616 07/24/24  1823   SODIUM mmol/L 136 139 140 137   POTASSIUM mmol/L 4.9 3.8 4.3 3.7   CHLORIDE mmol/L 107 107 107 105   CO2 mmol/L 22 25 27 24   ANION GAP mmol/L 7 7 6 8   BUN mg/dL 8 9 8 8   CREATININE mg/dL 0.59* 0.58* 0.62 0.79   EGFR ml/min/1.73sq m 126 127 124 112   CALCIUM mg/dL 8.6 8.8 9.0 8.6   MAGNESIUM mg/dL  --   --  2.1  --                           Results from last 7 days   Lab Units 07/28/24  1133 07/28/24  0722 07/27/24 2038 07/27/24  1538 07/27/24  1045 07/27/24  0727 07/26/24  2105 07/26/24  1656 07/26/24  1116 07/26/24  0840 07/25/24  2042 07/25/24  1605   POC GLUCOSE mg/dl 186* 131 148* 176* 150* 124 161* 139 128 137 219* 120              Results from last 7 days   Lab Units 07/27/24  0718 07/26/24  0656 07/25/24  0616 07/24/24  1823   GLUCOSE RANDOM mg/dL 125 146* 124 207*               Results from last 7 days   Lab Units 07/25/24  0616   HEMOGLOBIN A1C % 7.4*   EAG mg/dl 166      No results found for: \"BETA-HYDROXYBUTYRATE\"                          Results from last 7 days   Lab Units 07/24/24  2235 07/24/24  2041 07/24/24  1823   HS TNI 0HR ng/L  --   --  6   HS TNI 2HR ng/L  --  5  --    HSTNI D2 ng/L  --  -1  --    HS TNI 4HR ng/L 6  --   --  "   HSTNI D4 ng/L 0  --   --                Results from last 7 days   Lab Units 07/26/24  0656   PROTIME seconds 13.9   INR   1.05                               Results from last 7 days   Lab Units 07/24/24  1823   BNP pg/mL 30                                                                   Results from last 7 days   Lab Units 07/24/24  2314   SALMONELLA SP PCR   Negative   SHIGELLA SP/ENTEROINVASIVE E. COLI (EIEC)   Negative   CAMPYLOBACTER SP (JEJUNI AND COLI)   Negative   SHIGA TOXIN 1/SHIGA TOXIN 2   Negative                             Medications:   Scheduled Medications:  aspirin, 81 mg, Oral, Daily  atorvastatin, 80 mg, Oral, Daily With Dinner  enoxaparin, 40 mg, Subcutaneous, Daily  ezetimibe, 10 mg, Oral, Daily  insulin lispro, 1-6 Units, Subcutaneous, TID AC  insulin lispro, 1-6 Units, Subcutaneous, HS  lisinopril, 5 mg, Oral, Daily  metoprolol succinate, 50 mg, Oral, Daily  metroNIDAZOLE, 500 mg, Oral, Q8H JOSEFINA  pantoprazole, 40 mg, Oral, Early Morning  ticagrelor, 90 mg, Oral, Q12H JOSEFINA        Continuous IV Infusions:  sodium chloride 0.9 % infusion  Rate: 100 mL/hr Dose: 100 mL/hr  Freq: Continuous Route: IV  Last Dose: 100 mL/hr (07/26/24 1356)  Start: 07/26/24 1330 End: 07/27/24 0155  Infusions Meds - Displays dose, route, & frequency only         PRN Meds:  PRN Medications - displays dose, route, and frequency            Discharge Plan: D     Network Utilization Review Department  ATTENTION: Please call with any questions or concerns to [phone] and carefully listen to the prompts so that you are directed to the right person. All voicemails are confidential.   For Discharge needs, contact Care Management DC Support Team at [phone] opt. [phone]  Send all requests for admission clinical reviews, approved or denied determinations and any other requests to dedicated fax number below belonging to the campus where the patient is receiving treatment. List of dedicated fax numbers for the  Facilities:  FACILITY NAME UR FAX NUMBER   ADMISSION DENIALS (Administrative/Medical Necessity) [phone]   DISCHARGE SUPPORT TEAM (NETWORK) [phone]   PARENT CHILD HEALTH (Maternity/NICU/Pediatrics) [phone]   [facility] [phone]   [facility] [phone]   [facility] [phone]   [facility] [phone]   [facility] [phone]   [facility] [phone]   [facility] [phone]   [facility] [phone]   [facility] [phone]   [facility] [phone]   [facility] [phone]   [facility] [phone]

## 2024-07-29 NOTE — ASSESSMENT & PLAN NOTE
Left heart cardiac catheterization 7/26/2024 showed critical stenosis in the proximal and distal LCx, status post PCI with JESUS MANUEL x 1 to the proximal to mid LCx and JESUS MANUEL x 1 to the distal LCx  - Continue with aspirin 81 mg daily, started Brilinta 90 mg twice daily and atorvastatin increased to 80 mg daily  Close outpatient f/u with PCP, Cardiology - patient stated he will eventually be transitioning medical care from [place] to [place]

## 2024-07-29 NOTE — ASSESSMENT & PLAN NOTE
The patient is a resident of [place] and [place]  Patient voices eventual plan to relocate to [facility]  Discussed with case management.  His oxygen equipment and prescriptions will have to be filled in [place]. Medications sent to patient's pharmacy in [place] and his brother will deliver here. His PCP will arrange for home O2.

## 2024-07-29 NOTE — ASSESSMENT & PLAN NOTE
Patient had nocturnal pulse oximetry with room air on 7/27/2024 with 16 desaturation events.  Follow-up nocturnal pulse oximetry on 2 L showed resolution of desaturation events  Will need prescription for nocturnal oxygen - will be arranged via his PCP  Emphasized  weight loss and outpatient formal sleep study

## 2024-07-29 NOTE — CASE MANAGEMENT
Case Management Discharge Planning Note    Patient name Jaleel Leblanc  Location [facility] 4 /E4 -* MRN [id number]  : 1985 Date 2024       Current Admission Date: 2024  Current Admission Diagnosis:Chest pain due to myocardial ischemia   Patient Active Problem List    Diagnosis Date Noted Date Diagnosed    Dyspnea 2024     Complex care coordination 2024     Nocturnal hypoxemia 2024     Status post insertion of drug eluting coronary artery stent 2024     Chest pain due to myocardial ischemia 2024     History of diarrhea 2024     CAD (coronary artery disease) 2024     Pericarditis 2019     Noncompliance 2019     Hypercholesterolemia 2018     Essential hypertension 2018     Type 2 diabetes mellitus with hyperglycemia, without long-term current use of insulin ([facility]) 2018     Morbid obesity with body mass index (BMI) of 40.0 to 44.9 in adult ([facility]) 2018       LOS (days): 3  Geometric Mean LOS (GMLOS) (days):   Days to GMLOS:     OBJECTIVE:  Risk of Unplanned Readmission Score: 8.66         Current admission status: Inpatient   Preferred Pharmacy:   [id number] [place] [address] [address]  Phone: [phone] Fax: [phone]    [facility] [place] [address]  [facility] [address] [place] [address]  Phone: [phone] Fax: [phone]    Primary Care Provider: No primary care provider on file.    Primary Insurance: [employer] [facility]  Secondary Insurance:     DISCHARGE DETAILS:    CM met with patient at bedside to discuss barriers to home O2 being ordered due to his coverage being [facility] coverage.  Patient called his PCP with CM, PCP arranged an appointment for 24 at 10:30am to set patient up with home O2.  Patient and PCP in agreement with DC today from [facility].  Kendrick provided patient with a copy of overnight  study to show his PCP tomorrow.  CM notified SLIM.

## 2024-07-29 NOTE — ASSESSMENT & PLAN NOTE
Status post cardiac cath on 7/26 confirming critical focal lesion in the proximal left circumflex just prior to the previously placed stent and critical long lesion in the distal circumflex.  Both were treated with drug-eluting stents  Continue aspirin 81 mg daily, Brilinta 90 mg twice daily, metoprolol 50 mg daily, lisinopril 5 mg daily, Zetia and Lipitor  Outpatient cardiac rehab via patient's PCP

## 2024-07-29 NOTE — CASE MANAGEMENT
Case Management Discharge Planning Note    Patient name Jaleel Leblanc  Location [facility] 4 /E4 -* MRN [id number]  : 1985 Date 2024       Current Admission Date: 2024  Current Admission Diagnosis:Chest pain due to myocardial ischemia   Patient Active Problem List    Diagnosis Date Noted Date Diagnosed    Dyspnea 2024     Complex care coordination 2024     Nocturnal hypoxemia 2024     Status post insertion of drug eluting coronary artery stent 2024     Chest pain due to myocardial ischemia 2024     History of diarrhea 2024     CAD (coronary artery disease) 2024     Pericarditis 2019     Noncompliance 2019     Hypercholesterolemia 2018     Essential hypertension 2018     Type 2 diabetes mellitus with hyperglycemia, without long-term current use of insulin ([facility]) 2018     Morbid obesity with body mass index (BMI) of 40.0 to 44.9 in adult ([facility]) 2018       LOS (days): 3  Geometric Mean LOS (GMLOS) (days):   Days to GMLOS:     OBJECTIVE:  Risk of Unplanned Readmission Score: 8.91         Current admission status: Inpatient   Preferred Pharmacy:   [id number] - [place], [place] - [address] [address]  Phone: [phone] Fax: [phone]    [facility] [place] [address]  [facility] [address] [place] [address]  Phone: [phone] Fax: [phone]    Primary Care Provider: No primary care provider on file.    Primary Insurance: [facility]  Secondary Insurance:     DISCHARGE DETAILS:    Discharge planning discussed with:: Patient  Freedom of Choice: Yes     CM contacted family/caregiver?: No- see comments (Declined)  Were Treatment Team discharge recommendations reviewed with patient/caregiver?: Yes  Did patient/caregiver verbalize understanding of patient care needs?: Yes  Were patient/caregiver advised of the risks  associated with not following Treatment Team discharge recommendations?: Yes         Requested Home Health Care         Is the patient interested in HHC at discharge?: No    DME Referral Provided  Referral made for DME?: Yes  DME referral completed for the following items:: Home Oxygen concentrator  DME Supplier Name:: [employer]    Other Referral/Resources/Interventions Provided:  Interventions: DME    Would you like to participate in our [facility] service program?  : No - Declined    Treatment Team Recommendation: Home  Discharge Destination Plan:: Home  Transport at Discharge : Family              CM met with patient at bedside utilizing [employer] interpretor #[id number], patient would like home O2 delivered to [address] [place] [address] [place].  Patient reports someone would be home today for delivery.  CM verified patient's insurance card.    CM submitted order for home O2 via [place] to [employer]. [facility] reported issues regarding insurance coverage. CM called patient's insurance card, patient does not have out of state benefits.  CM messaged [employer] regarding options/ if O2 would be covered if delviered to [place].  [employer] is looking into this and will get back to CM.    CM called patient's pharmacy, patient does not have a copay for Brilinta.  CM will continue to follow for DC coordination.

## 2024-07-29 NOTE — ASSESSMENT & PLAN NOTE
Lab Results   Component Value Date    HGBA1C 7.4 (H) 07/25/2024       Recent Labs     07/28/24  1610 07/28/24 2035 07/29/24  0721 07/29/24  1100   POCGLU 180* 112 176* 199*     Resume metformin on discharge

## 2024-07-29 NOTE — DISCHARGE SUMMARY
[facility]  Discharge- Jaleel Leblanc 1985, 39 y.o. male MRN: [id number]  Unit/Bed#: E4 -01 Encounter: [id number]  Primary Care Provider: No primary care provider on file.   Date and time admitted to hospital: 7/24/2024  6:08 PM    * Chest pain due to myocardial ischemia  Assessment & Plan  Status post cardiac cath on 7/26 confirming critical focal lesion in the proximal left circumflex just prior to the previously placed stent and critical long lesion in the distal circumflex.  Both were treated with drug-eluting stents  Continue aspirin 81 mg daily, Brilinta 90 mg twice daily, metoprolol 50 mg daily, lisinopril 5 mg daily, Zetia and Lipitor  Outpatient cardiac rehab via patient's PCP    CAD (coronary artery disease)  Assessment & Plan  PMHx of MI 2014, S/p cardiac cath, 100% stenosis OM, 95% stenosis mid circumflex, s/p JESUS MANUEL x2  Pt last admitted in 2019 for chest pain with cardiac cath performed, no intervention required  At the time: patent circumflex. unchanged moderate nonobstructive distal circumflex (diffuse 50% stenosis) and proximal RCA disease (diffuse 60% stenosis), 40% stenosis distal RCA   Pt determined to have pericarditis and treated for this at that time  Pt reports compliance with aspirin, statin, and BB therapy although he has not been seen since 11/2019. No follow-up after discharge.  Brilinta added to therapy  [facility]    Complex care coordination  Assessment & Plan  The patient is a resident of [place]  Patient voices eventual plan to relocate to [facility]  Discussed with case management.  His oxygen equipment and prescriptions will have to be filled in [place]. Medications sent to patient's pharmacy in [place] and his brother will deliver here. His PCP will arrange for home O2.    Nocturnal hypoxemia  Assessment & Plan  Patient had nocturnal pulse oximetry with room air on 7/27/2024 with  16 desaturation events.  Follow-up nocturnal pulse oximetry on 2 L showed resolution of desaturation events  Will need prescription for nocturnal oxygen - will be arranged via his PCP  Emphasized  weight loss and outpatient formal sleep study    Status post insertion of drug eluting coronary artery stent  Assessment & Plan  Left heart cardiac catheterization 7/26/2024 showed critical stenosis in the proximal and distal LCx, status post PCI with JESUS MANUEL x 1 to the proximal to mid LCx and JESUS MANUEL x 1 to the distal LCx  - Continue with aspirin 81 mg daily, started Brilinta 90 mg twice daily and atorvastatin increased to 80 mg daily  Close outpatient f/u with PCP, Cardiology - patient stated he will eventually be transitioning medical care from [place] to [place]    Type 2 diabetes mellitus with hyperglycemia, without long-term current use of insulin ([facility])  Assessment & Plan  Lab Results   Component Value Date    HGBA1C 7.4 (H) 07/25/2024       Recent Labs     07/28/24  1610 07/28/24  2035 07/29/24  0721 07/29/24  1100   POCGLU 180* 112 176* 199*     Resume metformin on discharge    Essential hypertension  Assessment & Plan  Continue Toprol-XL 50 mg daily and enalapril on discharge     Hypercholesterolemia  Assessment & Plan  Continue Lipitor 80 mg daily and Zetia 10 mg daily       Medical Problems       Resolved Problems  Date Reviewed: 7/29/2024   None       Discharging Physician / Practitioner: Janene Almonte MD  PCP: No primary care provider on file.  Admission Date:   Admission Orders (From admission, onward)       Ordered        07/26/24 1704  INPATIENT ADMISSION  Once            07/24/24 2038  Place in Observation  Once                          Discharge Date: 07/29/24    Consultations During Hospital Stay:  Cardiology    Procedures Performed:     7/26/2024    Cardiac cath performed via the R radial artery.     Critical focal lesion in proximal L Cx just prior to the previously placed stent in the mid L Cx.  Successful PCI  with placement of a 3.25 x 12 mm stent.     Critical long lesion in the distal l Cx. Successful PCI with placement of a 2.25 x 30 mm JESUS MANUEL.          Significant Findings / Test Results:   XR chest pa & lateral   Final Result by Shantal Paredes MD (07/28 2034)      No acute cardiopulmonary disease.               Workstation performed: [id number]         XR chest 2 views   ED Interpretation by Daniel Tuttle MD (07/24 1954)   Primary reviewed; No acute abnotrmality      Final Result by Shantal Paredes MD (07/24 2157)      No acute cardiopulmonary disease.            Workstation performed: [id number]             Results from last 7 days   Lab Units 07/27/24  0822   WBC Thousand/uL 8.79   HEMOGLOBIN g/dL 14.6   HEMATOCRIT % 43.9   PLATELETS Thousands/uL 266         Results from last 7 days   Lab Units 07/27/24  0718   SODIUM mmol/L 136   POTASSIUM mmol/L 4.9   CHLORIDE mmol/L 107   CO2 mmol/L 22   BUN mg/dL 8   CREATININE mg/dL 0.59*   CALCIUM mg/dL 8.6       Test Results Pending at Discharge (will require follow up):   None     Outpatient Tests Requested:  Monitor BMP    Complications:  None    Reason for Admission: chest pain    Hospital Course:   Jaleel Leblanc is a 39 y.o. male patient who originally presented to the hospital on 7/24/2024 due to chest pain. Underwent cardiac cath with successful PCI placement x2 and a JESUS MANUEL placement. Remained hemodynamically stable. Prescriptions sent to patient's pharmacy in [place].  During hospitalization the patient was noted for mild nocturnal hypoxia. Due to [place] state insurance home O2 will be arranged for by his PCP.    Please see above list of diagnoses and related plan for additional information.     Condition at Discharge: good    Discharge Day Visit / Exam:     Subjective:  Patient reports feeling well and voices no complaints. He would like to be discharged home.    Vitals: Blood Pressure: 123/76 (07/29/24 0718)  Pulse: 96 (07/29/24 0718)  Temperature: 97.5 °F  "(36.4 °C) (07/28/24 2346)  Temp Source: Temporal (07/28/24 2346)  Respirations: 18 (07/29/24 0718)  Height: 5' 5\" (165.1 cm) (07/25/24 1415)  Weight - Scale: 106 kg (233 lb 4 oz) (07/29/24 0600)  SpO2: 98 % (07/29/24 0718)  Exam:   Physical Exam  Constitutional:       General: He is not in acute distress.  HENT:      Head: Normocephalic and atraumatic.   Eyes:      Conjunctiva/sclera: Conjunctivae normal.   Cardiovascular:      Rate and Rhythm: Normal rate and regular rhythm.   Pulmonary:      Effort: No respiratory distress.      Breath sounds: No wheezing or rales.   Abdominal:      General: There is no distension.      Tenderness: There is no abdominal tenderness. There is no guarding.   Musculoskeletal:      Right lower leg: No edema.      Left lower leg: No edema.   Skin:     General: Skin is warm and dry.   Neurological:      Mental Status: He is oriented to person, place, and time.   Psychiatric:         Mood and Affect: Mood normal.            Discharge instructions/Information to patient and family:   See after visit summary for information provided to patient and family.      Provisions for Follow-Up Care:  See after visit summary for information related to follow-up care and any pertinent home health orders.      Mobility at time of Discharge:   Basic Mobility Inpatient Raw Score: 24  JH-HLM Goal: 8: Walk 250 feet or more  JH-HLM Achieved: 7: Walk 25 feet or more  HLM Goal achieved. Continue to encourage appropriate mobility.     Disposition:   Home    Planned Readmission: No     Discharge Statement:  I spent 35 minutes discharging the patient. This time was spent on the day of discharge. I had direct contact with the patient on the day of discharge. Greater than 50% of the total time was spent examining patient, answering all patient questions, arranging and discussing plan of care with patient as well as directly providing post-discharge instructions.  Additional time then spent on discharge " activities.    Discharge Medications:  See after visit summary for reconciled discharge medications provided to patient and/or family.      **Please Note: This note may have been constructed using a voice recognition system**

## 2024-07-30 NOTE — UTILIZATION REVIEW
NOTIFICATION OF ADMISSION DISCHARGE   This is a Notification of Discharge from [facility]. Please be advised that this patient has been discharge from our facility. Below you will find the admission and discharge date and time including the patient’s disposition.   UTILIZATION REVIEW CONTACT:  Kaykay Mendoza MA  Utilization   Network Utilization Review Department  Phone: [phone] x carefully listen to the prompts. All voicemails are confidential.  Email: [email or web address]     ADMISSION INFORMATION  PRESENTATION DATE: 7/24/2024  6:08 PM  OBERVATION ADMISSION DATE: 07/24/2024 2038  INPATIENT ADMISSION DATE: 7/26/24  5:04 PM   DISCHARGE DATE: 7/29/2024  4:25 PM   DISPOSITION:Home/Self Care    Network Utilization Review Department  ATTENTION: Please call with any questions or concerns to [phone] and carefully listen to the prompts so that you are directed to the right person. All voicemails are confidential.   For Discharge needs, contact Care Management DC Support Team at [phone] opt. 2  Send all requests for admission clinical reviews, approved or denied determinations and any other requests to dedicated fax number below belonging to the campus where the patient is receiving treatment. List of dedicated fax numbers for the Facilities:  FACILITY NAME UR FAX NUMBER   ADMISSION DENIALS (Administrative/Medical Necessity) [phone]   DISCHARGE SUPPORT TEAM ([facility]) [phone]   PARENT CHILD HEALTH (Maternity/NICU/Pediatrics) [phone]   [facility] [phone]   [facility] [phone]   [facility] [phone]   [facility] [phone]   [facility] [phone]   [facility] [phone]   [facility] [phone]   [facility]  [place] [phone]   [facility] [phone]   [facility] [phone]   [facility] [phone]   [facility] [phone]

## 2025-05-03 ENCOUNTER — HOSPITAL ENCOUNTER (EMERGENCY)
Facility: HOSPITAL | Age: 40
Discharge: HOME | End: 2025-05-03
Attending: EMERGENCY MEDICINE

## 2025-05-03 VITALS
OXYGEN SATURATION: 96 % | WEIGHT: 226.85 LBS | BODY MASS INDEX: 37.75 KG/M2 | RESPIRATION RATE: 16 BRPM | DIASTOLIC BLOOD PRESSURE: 85 MMHG | TEMPERATURE: 97.8 F | SYSTOLIC BLOOD PRESSURE: 137 MMHG | HEART RATE: 90 BPM

## 2025-05-03 DIAGNOSIS — T30.0 FIRST DEGREE BURN: Primary | ICD-10-CM

## 2025-05-03 PROCEDURE — 99283 EMERGENCY DEPT VISIT LOW MDM: CPT | Performed by: EMERGENCY MEDICINE

## 2025-05-03 PROCEDURE — 99283 EMERGENCY DEPT VISIT LOW MDM: CPT

## 2025-05-03 RX ORDER — BACITRACIN, NEOMYCIN, POLYMYXIN B 400; 3.5; 5 [USP'U]/G; MG/G; [USP'U]/G
1 OINTMENT TOPICAL ONCE
Status: COMPLETED | OUTPATIENT
Start: 2025-05-03 | End: 2025-05-03

## 2025-05-03 RX ORDER — IBUPROFEN 400 MG/1
400 TABLET, FILM COATED ORAL ONCE
Status: COMPLETED | OUTPATIENT
Start: 2025-05-03 | End: 2025-05-03

## 2025-05-03 RX ORDER — ACETAMINOPHEN 325 MG/1
650 TABLET ORAL ONCE
Status: COMPLETED | OUTPATIENT
Start: 2025-05-03 | End: 2025-05-03

## 2025-05-03 RX ADMIN — ACETAMINOPHEN 650 MG: 325 TABLET, FILM COATED ORAL at 16:37

## 2025-05-03 RX ADMIN — BACITRACIN ZINC, NEOMYCIN, POLYMYXIN B 1 SMALL APPLICATION: 400; 3.5; 5 OINTMENT TOPICAL at 16:38

## 2025-05-03 RX ADMIN — IBUPROFEN 400 MG: 400 TABLET, FILM COATED ORAL at 16:38
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
## (undated) DEVICE — RUNTHROUGH NS EXTRA FLOPPY PTCA GUIDEWIRE: Brand: RUNTHROUGH

## (undated) DEVICE — GUIDEWIRE WHOLEY .035 145 CM FLOP STR TIP

## (undated) DEVICE — GUIDEWIRE .035 260CM 3MM J TIP

## (undated) DEVICE — CATH GUIDE LAUNCHER 6FR EBU 3.5

## (undated) DEVICE — STENT ONYXNG22538UX ONYX 2.25X38RX
Type: IMPLANTABLE DEVICE | Status: NON-FUNCTIONAL
Brand: ONYX FRONTIER™

## (undated) DEVICE — BALLOON EUPHORA RX 2.5 X 15MM

## (undated) DEVICE — BALLOON EUPHORA RX 2.25 X 15MM

## (undated) DEVICE — GLIDESHEATH BASIC HYDROPHILIC COATED INTRODUCER SHEATH: Brand: GLIDESHEATH

## (undated) DEVICE — RADIFOCUS OPTITORQUE ANGIOGRAPHIC CATHETER: Brand: OPTITORQUE